# Patient Record
Sex: MALE | Race: WHITE | Employment: OTHER | URBAN - NONMETROPOLITAN AREA
[De-identification: names, ages, dates, MRNs, and addresses within clinical notes are randomized per-mention and may not be internally consistent; named-entity substitution may affect disease eponyms.]

---

## 2017-03-11 ENCOUNTER — HOSPITAL ENCOUNTER (EMERGENCY)
Age: 52
Discharge: HOME | End: 2017-03-11
Attending: FAMILY MEDICINE
Payer: MEDICARE

## 2017-03-11 VITALS
SYSTOLIC BLOOD PRESSURE: 148 MMHG | DIASTOLIC BLOOD PRESSURE: 87 MMHG | BODY MASS INDEX: 35 KG/M2 | TEMPERATURE: 97.4 F | OXYGEN SATURATION: 98 % | WEIGHT: 280 LBS | HEART RATE: 80 BPM | RESPIRATION RATE: 20 BRPM

## 2017-03-11 DIAGNOSIS — L03.115 CELLULITIS OF RIGHT LOWER EXTREMITY: Primary | ICD-10-CM

## 2017-03-11 LAB
ABSOLUTE EOS #: 0.2 K/UL (ref 0–0.4)
ABSOLUTE LYMPH #: 1.3 K/UL (ref 0.9–2.5)
ABSOLUTE MONO #: 0.5 K/UL (ref 0–1)
ALBUMIN SERPL-MCNC: 3.6 G/DL (ref 3.5–5.2)
ALBUMIN/GLOBULIN RATIO: ABNORMAL (ref 1–2.5)
ALP BLD-CCNC: 89 U/L (ref 40–129)
ALT SERPL-CCNC: 32 U/L (ref 5–41)
ANION GAP SERPL CALCULATED.3IONS-SCNC: 11 MMOL/L (ref 9–17)
AST SERPL-CCNC: 32 U/L
BASOPHILS # BLD: 0 % (ref 0–2)
BASOPHILS ABSOLUTE: 0 K/UL (ref 0–0.2)
BILIRUB SERPL-MCNC: 0.62 MG/DL (ref 0.3–1.2)
BUN BLDV-MCNC: 12 MG/DL (ref 6–20)
BUN/CREAT BLD: 13 (ref 9–20)
CALCIUM SERPL-MCNC: 8.8 MG/DL (ref 8.6–10.4)
CHLORIDE BLD-SCNC: 102 MMOL/L (ref 98–107)
CO2: 27 MMOL/L (ref 20–31)
CREAT SERPL-MCNC: 0.9 MG/DL (ref 0.7–1.2)
D-DIMER QUANTITATIVE: 0.3 MG/L FEU (ref 0–0.5)
DIFFERENTIAL TYPE: YES
EOSINOPHILS RELATIVE PERCENT: 3 % (ref 0–5)
GFR AFRICAN AMERICAN: >60 ML/MIN
GFR NON-AFRICAN AMERICAN: >60 ML/MIN
GFR SERPL CREATININE-BSD FRML MDRD: ABNORMAL ML/MIN/{1.73_M2}
GFR SERPL CREATININE-BSD FRML MDRD: ABNORMAL ML/MIN/{1.73_M2}
GLUCOSE BLD-MCNC: 137 MG/DL (ref 70–99)
HCT VFR BLD CALC: 48.8 % (ref 41–53)
HEMOGLOBIN: 15.9 G/DL (ref 13.5–17.5)
LACTIC ACID: 1.6 MMOL/L (ref 0.5–2.2)
LYMPHOCYTES # BLD: 18 % (ref 13–44)
MCH RBC QN AUTO: 27 PG (ref 26–34)
MCHC RBC AUTO-ENTMCNC: 32.7 G/DL (ref 31–37)
MCV RBC AUTO: 82.6 FL (ref 80–100)
MONOCYTES # BLD: 7 % (ref 5–9)
PDW BLD-RTO: 14.7 % (ref 12.1–15.2)
PLATELET # BLD: 189 K/UL (ref 140–450)
PLATELET ESTIMATE: ABNORMAL
PMV BLD AUTO: ABNORMAL FL (ref 6–12)
POTASSIUM SERPL-SCNC: 3.5 MMOL/L (ref 3.7–5.3)
RBC # BLD: 5.91 M/UL (ref 4.5–5.9)
RBC # BLD: ABNORMAL 10*6/UL
SEG NEUTROPHILS: 72 % (ref 39–75)
SEGMENTED NEUTROPHILS ABSOLUTE COUNT: 4.9 K/UL (ref 2.1–6.5)
SODIUM BLD-SCNC: 140 MMOL/L (ref 135–144)
TOTAL PROTEIN: 7.3 G/DL (ref 6.4–8.3)
WBC # BLD: 6.9 K/UL (ref 3.5–11)
WBC # BLD: ABNORMAL 10*3/UL

## 2017-03-11 PROCEDURE — 80053 COMPREHEN METABOLIC PANEL: CPT

## 2017-03-11 PROCEDURE — 2580000003 HC RX 258: Performed by: FAMILY MEDICINE

## 2017-03-11 PROCEDURE — 99283 EMERGENCY DEPT VISIT LOW MDM: CPT

## 2017-03-11 PROCEDURE — 83605 ASSAY OF LACTIC ACID: CPT

## 2017-03-11 PROCEDURE — 87040 BLOOD CULTURE FOR BACTERIA: CPT

## 2017-03-11 PROCEDURE — 85379 FIBRIN DEGRADATION QUANT: CPT

## 2017-03-11 PROCEDURE — 96367 TX/PROPH/DG ADDL SEQ IV INF: CPT

## 2017-03-11 PROCEDURE — 96365 THER/PROPH/DIAG IV INF INIT: CPT

## 2017-03-11 PROCEDURE — 85025 COMPLETE CBC W/AUTO DIFF WBC: CPT

## 2017-03-11 PROCEDURE — 96375 TX/PRO/DX INJ NEW DRUG ADDON: CPT

## 2017-03-11 PROCEDURE — 6360000002 HC RX W HCPCS: Performed by: FAMILY MEDICINE

## 2017-03-11 RX ORDER — CEPHALEXIN 500 MG/1
500 CAPSULE ORAL 3 TIMES DAILY
Qty: 30 CAPSULE | Refills: 0 | Status: SHIPPED | OUTPATIENT
Start: 2017-03-11 | End: 2019-06-13

## 2017-03-11 RX ORDER — SULFAMETHOXAZOLE AND TRIMETHOPRIM 800; 160 MG/1; MG/1
1 TABLET ORAL 2 TIMES DAILY
Qty: 20 TABLET | Refills: 0 | Status: SHIPPED | OUTPATIENT
Start: 2017-03-11 | End: 2017-03-21

## 2017-03-11 RX ORDER — KETOROLAC TROMETHAMINE 30 MG/ML
30 INJECTION, SOLUTION INTRAMUSCULAR; INTRAVENOUS ONCE
Status: COMPLETED | OUTPATIENT
Start: 2017-03-11 | End: 2017-03-11

## 2017-03-11 RX ADMIN — CEFTRIAXONE 1 G: 1 INJECTION, POWDER, FOR SOLUTION INTRAMUSCULAR; INTRAVENOUS at 14:27

## 2017-03-11 RX ADMIN — VANCOMYCIN HYDROCHLORIDE 1000 MG: 1 INJECTION, POWDER, LYOPHILIZED, FOR SOLUTION INTRAVENOUS at 15:14

## 2017-03-11 RX ADMIN — KETOROLAC TROMETHAMINE 30 MG: 30 INJECTION, SOLUTION INTRAMUSCULAR at 16:26

## 2017-03-11 ASSESSMENT — PAIN SCALES - GENERAL
PAINLEVEL_OUTOF10: 5
PAINLEVEL_OUTOF10: 5
PAINLEVEL_OUTOF10: 6

## 2017-03-11 ASSESSMENT — PAIN DESCRIPTION - PAIN TYPE: TYPE: ACUTE PAIN

## 2017-03-11 ASSESSMENT — PAIN DESCRIPTION - DESCRIPTORS: DESCRIPTORS: CONSTANT;DULL

## 2017-03-11 ASSESSMENT — PAIN DESCRIPTION - LOCATION: LOCATION: LEG

## 2017-03-11 ASSESSMENT — PAIN DESCRIPTION - ORIENTATION: ORIENTATION: RIGHT

## 2017-03-11 ASSESSMENT — PAIN DESCRIPTION - FREQUENCY: FREQUENCY: INTERMITTENT

## 2017-03-17 LAB
CULTURE: NORMAL
Lab: NORMAL
SPECIMEN DESCRIPTION: NORMAL
STATUS: NORMAL

## 2019-06-13 ENCOUNTER — HOSPITAL ENCOUNTER (INPATIENT)
Age: 54
LOS: 4 days | Discharge: HOME | DRG: 603 | End: 2019-06-17
Attending: EMERGENCY MEDICINE | Admitting: INTERNAL MEDICINE
Payer: MEDICARE

## 2019-06-13 ENCOUNTER — APPOINTMENT (OUTPATIENT)
Dept: GENERAL RADIOLOGY | Age: 54
DRG: 603 | End: 2019-06-13
Payer: MEDICARE

## 2019-06-13 ENCOUNTER — APPOINTMENT (OUTPATIENT)
Dept: VASCULAR LAB | Age: 54
DRG: 603 | End: 2019-06-13
Payer: MEDICARE

## 2019-06-13 DIAGNOSIS — L03.115 CELLULITIS OF RIGHT LOWER EXTREMITY: Primary | ICD-10-CM

## 2019-06-13 DIAGNOSIS — L03.119 CELLULITIS OF LOWER LEG: ICD-10-CM

## 2019-06-13 LAB
-: NORMAL
ABSOLUTE EOS #: 0.4 K/UL (ref 0–0.4)
ABSOLUTE IMMATURE GRANULOCYTE: ABNORMAL K/UL (ref 0–0.3)
ABSOLUTE LYMPH #: 1.4 K/UL (ref 1–4.8)
ABSOLUTE MONO #: 0.6 K/UL (ref 0–1)
ALBUMIN SERPL-MCNC: 4.2 G/DL (ref 3.5–5.2)
ALBUMIN/GLOBULIN RATIO: ABNORMAL (ref 1–2.5)
ALP BLD-CCNC: 95 U/L (ref 40–129)
ALT SERPL-CCNC: 86 U/L (ref 5–41)
AMORPHOUS: NORMAL
ANION GAP SERPL CALCULATED.3IONS-SCNC: 11 MMOL/L (ref 9–17)
AST SERPL-CCNC: 61 U/L
BACTERIA: NORMAL
BASOPHILS # BLD: 0 % (ref 0–2)
BASOPHILS ABSOLUTE: 0 K/UL (ref 0–0.2)
BILIRUB SERPL-MCNC: 0.49 MG/DL (ref 0.3–1.2)
BILIRUBIN URINE: NEGATIVE
BNP INTERPRETATION: ABNORMAL
BUN BLDV-MCNC: 12 MG/DL (ref 6–20)
BUN/CREAT BLD: 16 (ref 9–20)
CALCIUM SERPL-MCNC: 9.5 MG/DL (ref 8.6–10.4)
CASTS UA: NORMAL /LPF
CHLORIDE BLD-SCNC: 101 MMOL/L (ref 98–107)
CO2: 27 MMOL/L (ref 20–31)
COLOR: YELLOW
COMMENT UA: ABNORMAL
CREAT SERPL-MCNC: 0.74 MG/DL (ref 0.7–1.2)
CRYSTALS, UA: NORMAL /HPF
DIFFERENTIAL TYPE: YES
EOSINOPHILS RELATIVE PERCENT: 7 % (ref 0–5)
EPITHELIAL CELLS UA: NORMAL /HPF
GFR AFRICAN AMERICAN: >60 ML/MIN
GFR NON-AFRICAN AMERICAN: >60 ML/MIN
GFR SERPL CREATININE-BSD FRML MDRD: ABNORMAL ML/MIN/{1.73_M2}
GFR SERPL CREATININE-BSD FRML MDRD: ABNORMAL ML/MIN/{1.73_M2}
GLUCOSE BLD-MCNC: 90 MG/DL (ref 70–99)
GLUCOSE URINE: NEGATIVE
HCT VFR BLD CALC: 44.6 % (ref 41–53)
HEMOGLOBIN: 14.6 G/DL (ref 13.5–17.5)
IMMATURE GRANULOCYTES: ABNORMAL %
KETONES, URINE: NEGATIVE
LEUKOCYTE ESTERASE, URINE: NEGATIVE
LYMPHOCYTES # BLD: 22 % (ref 13–44)
MCH RBC QN AUTO: 26.8 PG (ref 26–34)
MCHC RBC AUTO-ENTMCNC: 32.8 G/DL (ref 31–37)
MCV RBC AUTO: 81.5 FL (ref 80–100)
MONOCYTES # BLD: 10 % (ref 5–9)
MUCUS: NORMAL
NITRITE, URINE: NEGATIVE
NRBC AUTOMATED: ABNORMAL PER 100 WBC
OTHER OBSERVATIONS UA: NORMAL
PDW BLD-RTO: 14.4 % (ref 12.1–15.2)
PH UA: 7 (ref 5–8)
PLATELET # BLD: 194 K/UL (ref 140–450)
PLATELET ESTIMATE: ABNORMAL
PMV BLD AUTO: ABNORMAL FL (ref 6–12)
POTASSIUM SERPL-SCNC: 4.1 MMOL/L (ref 3.7–5.3)
PRO-BNP: 370 PG/ML
PROTEIN UA: NEGATIVE
RBC # BLD: 5.47 M/UL (ref 4.5–5.9)
RBC # BLD: ABNORMAL 10*6/UL
RBC UA: NORMAL /HPF (ref 0–2)
RENAL EPITHELIAL, UA: NORMAL /HPF
SEG NEUTROPHILS: 61 % (ref 39–75)
SEGMENTED NEUTROPHILS ABSOLUTE COUNT: 3.8 K/UL (ref 2.1–6.5)
SODIUM BLD-SCNC: 139 MMOL/L (ref 135–144)
SPECIFIC GRAVITY UA: 1.01 (ref 1–1.03)
TOTAL PROTEIN: 8.1 G/DL (ref 6.4–8.3)
TRICHOMONAS: NORMAL
TROPONIN INTERP: NORMAL
TROPONIN T: <0.03 NG/ML
TROPONIN, HIGH SENSITIVITY: NORMAL NG/L (ref 0–22)
TURBIDITY: CLEAR
URINE HGB: NEGATIVE
UROBILINOGEN, URINE: ABNORMAL
WBC # BLD: 6.3 K/UL (ref 3.5–11)
WBC # BLD: ABNORMAL 10*3/UL
WBC UA: NORMAL /HPF
YEAST: NORMAL

## 2019-06-13 PROCEDURE — 71045 X-RAY EXAM CHEST 1 VIEW: CPT

## 2019-06-13 PROCEDURE — 80053 COMPREHEN METABOLIC PANEL: CPT

## 2019-06-13 PROCEDURE — 83880 ASSAY OF NATRIURETIC PEPTIDE: CPT

## 2019-06-13 PROCEDURE — 6360000002 HC RX W HCPCS: Performed by: EMERGENCY MEDICINE

## 2019-06-13 PROCEDURE — 36415 COLL VENOUS BLD VENIPUNCTURE: CPT

## 2019-06-13 PROCEDURE — 93971 EXTREMITY STUDY: CPT

## 2019-06-13 PROCEDURE — 85025 COMPLETE CBC W/AUTO DIFF WBC: CPT

## 2019-06-13 PROCEDURE — 6360000002 HC RX W HCPCS: Performed by: INTERNAL MEDICINE

## 2019-06-13 PROCEDURE — 2580000003 HC RX 258: Performed by: INTERNAL MEDICINE

## 2019-06-13 PROCEDURE — 6370000000 HC RX 637 (ALT 250 FOR IP): Performed by: INTERNAL MEDICINE

## 2019-06-13 PROCEDURE — 84484 ASSAY OF TROPONIN QUANT: CPT

## 2019-06-13 PROCEDURE — 96375 TX/PRO/DX INJ NEW DRUG ADDON: CPT

## 2019-06-13 PROCEDURE — 81001 URINALYSIS AUTO W/SCOPE: CPT

## 2019-06-13 PROCEDURE — 96365 THER/PROPH/DIAG IV INF INIT: CPT

## 2019-06-13 PROCEDURE — 94761 N-INVAS EAR/PLS OXIMETRY MLT: CPT

## 2019-06-13 PROCEDURE — 87040 BLOOD CULTURE FOR BACTERIA: CPT

## 2019-06-13 PROCEDURE — 99285 EMERGENCY DEPT VISIT HI MDM: CPT

## 2019-06-13 PROCEDURE — 2580000003 HC RX 258: Performed by: EMERGENCY MEDICINE

## 2019-06-13 PROCEDURE — 93005 ELECTROCARDIOGRAM TRACING: CPT | Performed by: EMERGENCY MEDICINE

## 2019-06-13 PROCEDURE — 1200000000 HC SEMI PRIVATE

## 2019-06-13 RX ORDER — LEVOFLOXACIN 5 MG/ML
500 INJECTION, SOLUTION INTRAVENOUS EVERY 24 HOURS
Status: DISCONTINUED | OUTPATIENT
Start: 2019-06-13 | End: 2019-06-17 | Stop reason: HOSPADM

## 2019-06-13 RX ORDER — FENOFIBRATE 160 MG/1
160 TABLET ORAL DAILY
Status: ON HOLD | COMMUNITY
End: 2019-06-17 | Stop reason: HOSPADM

## 2019-06-13 RX ORDER — TRIAMTERENE AND HYDROCHLOROTHIAZIDE 37.5; 25 MG/1; MG/1
1 TABLET ORAL DAILY
Status: DISCONTINUED | OUTPATIENT
Start: 2019-06-13 | End: 2019-06-17 | Stop reason: HOSPADM

## 2019-06-13 RX ORDER — VANCOMYCIN HYDROCHLORIDE 500 MG/10ML
INJECTION, POWDER, LYOPHILIZED, FOR SOLUTION INTRAVENOUS
Status: DISPENSED
Start: 2019-06-13 | End: 2019-06-14

## 2019-06-13 RX ORDER — METOPROLOL TARTRATE 50 MG/1
75 TABLET, FILM COATED ORAL 2 TIMES DAILY
COMMUNITY
End: 2019-06-24 | Stop reason: SDUPTHER

## 2019-06-13 RX ORDER — KETOROLAC TROMETHAMINE 30 MG/ML
15 INJECTION, SOLUTION INTRAMUSCULAR; INTRAVENOUS ONCE
Status: COMPLETED | OUTPATIENT
Start: 2019-06-13 | End: 2019-06-13

## 2019-06-13 RX ORDER — HYDRALAZINE HYDROCHLORIDE 20 MG/ML
10 INJECTION INTRAMUSCULAR; INTRAVENOUS EVERY 6 HOURS PRN
Status: DISCONTINUED | OUTPATIENT
Start: 2019-06-13 | End: 2019-06-15

## 2019-06-13 RX ORDER — SODIUM CHLORIDE 0.9 % (FLUSH) 0.9 %
10 SYRINGE (ML) INJECTION EVERY 12 HOURS SCHEDULED
Status: DISCONTINUED | OUTPATIENT
Start: 2019-06-13 | End: 2019-06-17 | Stop reason: HOSPADM

## 2019-06-13 RX ORDER — HYDROCODONE BITARTRATE AND ACETAMINOPHEN 5; 325 MG/1; MG/1
1 TABLET ORAL EVERY 6 HOURS PRN
Status: DISCONTINUED | OUTPATIENT
Start: 2019-06-13 | End: 2019-06-14

## 2019-06-13 RX ORDER — SODIUM CHLORIDE 0.9 % (FLUSH) 0.9 %
10 SYRINGE (ML) INJECTION PRN
Status: DISCONTINUED | OUTPATIENT
Start: 2019-06-13 | End: 2019-06-17 | Stop reason: HOSPADM

## 2019-06-13 RX ORDER — ONDANSETRON 2 MG/ML
4 INJECTION INTRAMUSCULAR; INTRAVENOUS EVERY 6 HOURS PRN
Status: DISCONTINUED | OUTPATIENT
Start: 2019-06-13 | End: 2019-06-17 | Stop reason: HOSPADM

## 2019-06-13 RX ORDER — VANCOMYCIN HYDROCHLORIDE 1 G/20ML
INJECTION, POWDER, LYOPHILIZED, FOR SOLUTION INTRAVENOUS
Status: DISPENSED
Start: 2019-06-13 | End: 2019-06-14

## 2019-06-13 RX ORDER — ACETAMINOPHEN 325 MG/1
650 TABLET ORAL EVERY 4 HOURS PRN
Status: DISCONTINUED | OUTPATIENT
Start: 2019-06-13 | End: 2019-06-17 | Stop reason: HOSPADM

## 2019-06-13 RX ORDER — FENOFIBRATE 160 MG/1
160 TABLET ORAL DAILY
Status: DISCONTINUED | OUTPATIENT
Start: 2019-06-13 | End: 2019-06-17 | Stop reason: HOSPADM

## 2019-06-13 RX ADMIN — KETOROLAC TROMETHAMINE 15 MG: 30 INJECTION, SOLUTION INTRAMUSCULAR at 18:47

## 2019-06-13 RX ADMIN — SODIUM CHLORIDE 3 G: 900 INJECTION INTRAVENOUS at 18:37

## 2019-06-13 RX ADMIN — Medication 10 ML: at 20:27

## 2019-06-13 RX ADMIN — VANCOMYCIN HYDROCHLORIDE 1500 MG: 1 INJECTION, POWDER, LYOPHILIZED, FOR SOLUTION INTRAVENOUS at 22:22

## 2019-06-13 RX ADMIN — LEVOFLOXACIN 500 MG: 5 INJECTION, SOLUTION INTRAVENOUS at 20:27

## 2019-06-13 RX ADMIN — ENOXAPARIN SODIUM 40 MG: 40 INJECTION SUBCUTANEOUS at 20:27

## 2019-06-13 RX ADMIN — HYDROCODONE BITARTRATE AND ACETAMINOPHEN 1 TABLET: 5; 325 TABLET ORAL at 22:24

## 2019-06-13 RX ADMIN — Medication 10 ML: at 21:25

## 2019-06-13 RX ADMIN — FENOFIBRATE 160 MG: 160 TABLET ORAL at 20:28

## 2019-06-13 RX ADMIN — METOPROLOL TARTRATE 75 MG: 50 TABLET ORAL at 20:28

## 2019-06-13 RX ADMIN — TRIAMTERENE AND HYDROCHLOROTHIAZIDE 1 TABLET: 37.5; 25 TABLET ORAL at 20:28

## 2019-06-13 ASSESSMENT — PAIN DESCRIPTION - LOCATION
LOCATION: LEG
LOCATION: LEG

## 2019-06-13 ASSESSMENT — PAIN DESCRIPTION - ONSET
ONSET: ON-GOING
ONSET: ON-GOING

## 2019-06-13 ASSESSMENT — PAIN DESCRIPTION - PAIN TYPE
TYPE: ACUTE PAIN
TYPE: ACUTE PAIN

## 2019-06-13 ASSESSMENT — PAIN DESCRIPTION - DESCRIPTORS
DESCRIPTORS: THROBBING
DESCRIPTORS: THROBBING

## 2019-06-13 ASSESSMENT — PAIN SCALES - GENERAL
PAINLEVEL_OUTOF10: 6
PAINLEVEL_OUTOF10: 8
PAINLEVEL_OUTOF10: 5
PAINLEVEL_OUTOF10: 7
PAINLEVEL_OUTOF10: 8

## 2019-06-13 ASSESSMENT — PAIN DESCRIPTION - PROGRESSION: CLINICAL_PROGRESSION: NOT CHANGED

## 2019-06-13 ASSESSMENT — PAIN DESCRIPTION - FREQUENCY
FREQUENCY: CONTINUOUS
FREQUENCY: CONTINUOUS

## 2019-06-13 ASSESSMENT — ENCOUNTER SYMPTOMS
COLOR CHANGE: 1
SHORTNESS OF BREATH: 0
NAUSEA: 0
SORE THROAT: 0
VOMITING: 0
BACK PAIN: 0
COUGH: 0
ABDOMINAL PAIN: 0
DIARRHEA: 0
EYE PAIN: 0
TROUBLE SWALLOWING: 0
EYE REDNESS: 0

## 2019-06-13 ASSESSMENT — PAIN DESCRIPTION - ORIENTATION
ORIENTATION: RIGHT
ORIENTATION: RIGHT

## 2019-06-14 LAB
ABSOLUTE EOS #: 0.4 K/UL (ref 0–0.4)
ABSOLUTE IMMATURE GRANULOCYTE: ABNORMAL K/UL (ref 0–0.3)
ABSOLUTE LYMPH #: 1.1 K/UL (ref 1–4.8)
ABSOLUTE MONO #: 0.8 K/UL (ref 0–1)
ANION GAP SERPL CALCULATED.3IONS-SCNC: 12 MMOL/L (ref 9–17)
BASOPHILS # BLD: 1 % (ref 0–2)
BASOPHILS ABSOLUTE: 0 K/UL (ref 0–0.2)
BUN BLDV-MCNC: 13 MG/DL (ref 6–20)
BUN/CREAT BLD: 17 (ref 9–20)
CALCIUM SERPL-MCNC: 9.7 MG/DL (ref 8.6–10.4)
CHLORIDE BLD-SCNC: 98 MMOL/L (ref 98–107)
CO2: 28 MMOL/L (ref 20–31)
CREAT SERPL-MCNC: 0.75 MG/DL (ref 0.7–1.2)
DIFFERENTIAL TYPE: YES
EKG ATRIAL RATE: 68 BPM
EKG P AXIS: -16 DEGREES
EKG P-R INTERVAL: 198 MS
EKG Q-T INTERVAL: 442 MS
EKG QRS DURATION: 108 MS
EKG QTC CALCULATION (BAZETT): 469 MS
EKG R AXIS: 25 DEGREES
EKG T AXIS: 24 DEGREES
EKG VENTRICULAR RATE: 68 BPM
EOSINOPHILS RELATIVE PERCENT: 8 % (ref 0–5)
GFR AFRICAN AMERICAN: >60 ML/MIN
GFR NON-AFRICAN AMERICAN: >60 ML/MIN
GFR SERPL CREATININE-BSD FRML MDRD: ABNORMAL ML/MIN/{1.73_M2}
GFR SERPL CREATININE-BSD FRML MDRD: ABNORMAL ML/MIN/{1.73_M2}
GLUCOSE BLD-MCNC: 111 MG/DL (ref 70–99)
HCT VFR BLD CALC: 43.6 % (ref 41–53)
HEMOGLOBIN: 14.4 G/DL (ref 13.5–17.5)
IMMATURE GRANULOCYTES: ABNORMAL %
LYMPHOCYTES # BLD: 20 % (ref 13–44)
MCH RBC QN AUTO: 26.8 PG (ref 26–34)
MCHC RBC AUTO-ENTMCNC: 33.1 G/DL (ref 31–37)
MCV RBC AUTO: 81.1 FL (ref 80–100)
MONOCYTES # BLD: 14 % (ref 5–9)
NRBC AUTOMATED: ABNORMAL PER 100 WBC
PDW BLD-RTO: 14.3 % (ref 12.1–15.2)
PLATELET # BLD: 167 K/UL (ref 140–450)
PLATELET ESTIMATE: ABNORMAL
PMV BLD AUTO: ABNORMAL FL (ref 6–12)
POTASSIUM SERPL-SCNC: 3.9 MMOL/L (ref 3.7–5.3)
RBC # BLD: 5.38 M/UL (ref 4.5–5.9)
RBC # BLD: ABNORMAL 10*6/UL
SEG NEUTROPHILS: 57 % (ref 39–75)
SEGMENTED NEUTROPHILS ABSOLUTE COUNT: 3.2 K/UL (ref 2.1–6.5)
SODIUM BLD-SCNC: 138 MMOL/L (ref 135–144)
WBC # BLD: 5.6 K/UL (ref 3.5–11)
WBC # BLD: ABNORMAL 10*3/UL

## 2019-06-14 PROCEDURE — 80048 BASIC METABOLIC PNL TOTAL CA: CPT

## 2019-06-14 PROCEDURE — 1200000000 HC SEMI PRIVATE

## 2019-06-14 PROCEDURE — 6360000002 HC RX W HCPCS: Performed by: INTERNAL MEDICINE

## 2019-06-14 PROCEDURE — 6360000002 HC RX W HCPCS

## 2019-06-14 PROCEDURE — 36415 COLL VENOUS BLD VENIPUNCTURE: CPT

## 2019-06-14 PROCEDURE — 93010 ELECTROCARDIOGRAM REPORT: CPT | Performed by: INTERNAL MEDICINE

## 2019-06-14 PROCEDURE — 2580000003 HC RX 258: Performed by: INTERNAL MEDICINE

## 2019-06-14 PROCEDURE — 94761 N-INVAS EAR/PLS OXIMETRY MLT: CPT

## 2019-06-14 PROCEDURE — 6370000000 HC RX 637 (ALT 250 FOR IP): Performed by: INTERNAL MEDICINE

## 2019-06-14 PROCEDURE — 85025 COMPLETE CBC W/AUTO DIFF WBC: CPT

## 2019-06-14 RX ORDER — OXYCODONE HYDROCHLORIDE AND ACETAMINOPHEN 5; 325 MG/1; MG/1
1 TABLET ORAL EVERY 4 HOURS PRN
Status: DISCONTINUED | OUTPATIENT
Start: 2019-06-14 | End: 2019-06-17 | Stop reason: HOSPADM

## 2019-06-14 RX ORDER — VANCOMYCIN HYDROCHLORIDE 1 G/20ML
INJECTION, POWDER, LYOPHILIZED, FOR SOLUTION INTRAVENOUS
Status: COMPLETED
Start: 2019-06-14 | End: 2019-06-14

## 2019-06-14 RX ORDER — VANCOMYCIN HYDROCHLORIDE 500 MG/10ML
INJECTION, POWDER, LYOPHILIZED, FOR SOLUTION INTRAVENOUS
Status: COMPLETED
Start: 2019-06-14 | End: 2019-06-14

## 2019-06-14 RX ADMIN — TRIAMTERENE AND HYDROCHLOROTHIAZIDE 1 TABLET: 37.5; 25 TABLET ORAL at 08:41

## 2019-06-14 RX ADMIN — Medication 10 ML: at 20:02

## 2019-06-14 RX ADMIN — VANCOMYCIN HYDROCHLORIDE 1000 MG: 500 INJECTION, POWDER, LYOPHILIZED, FOR SOLUTION INTRAVENOUS at 05:26

## 2019-06-14 RX ADMIN — HYDRALAZINE HYDROCHLORIDE 10 MG: 20 INJECTION INTRAMUSCULAR; INTRAVENOUS at 22:05

## 2019-06-14 RX ADMIN — VANCOMYCIN HYDROCHLORIDE 1500 MG: 1 INJECTION, POWDER, LYOPHILIZED, FOR SOLUTION INTRAVENOUS at 05:27

## 2019-06-14 RX ADMIN — ENOXAPARIN SODIUM 40 MG: 40 INJECTION SUBCUTANEOUS at 08:41

## 2019-06-14 RX ADMIN — Medication 10 ML: at 08:41

## 2019-06-14 RX ADMIN — OXYCODONE HYDROCHLORIDE AND ACETAMINOPHEN 1 TABLET: 5; 325 TABLET ORAL at 17:56

## 2019-06-14 RX ADMIN — FENOFIBRATE 160 MG: 160 TABLET ORAL at 08:41

## 2019-06-14 RX ADMIN — OXYCODONE HYDROCHLORIDE AND ACETAMINOPHEN 1 TABLET: 5; 325 TABLET ORAL at 22:05

## 2019-06-14 RX ADMIN — LEVOFLOXACIN 500 MG: 5 INJECTION, SOLUTION INTRAVENOUS at 20:01

## 2019-06-14 RX ADMIN — HYDROCODONE BITARTRATE AND ACETAMINOPHEN 1 TABLET: 5; 325 TABLET ORAL at 05:08

## 2019-06-14 RX ADMIN — VANCOMYCIN HYDROCHLORIDE 1500 MG: 1 INJECTION, POWDER, LYOPHILIZED, FOR SOLUTION INTRAVENOUS at 13:13

## 2019-06-14 RX ADMIN — OXYCODONE HYDROCHLORIDE AND ACETAMINOPHEN 1 TABLET: 5; 325 TABLET ORAL at 09:11

## 2019-06-14 RX ADMIN — VANCOMYCIN HYDROCHLORIDE 500 MG: 1 INJECTION, POWDER, LYOPHILIZED, FOR SOLUTION INTRAVENOUS at 05:27

## 2019-06-14 RX ADMIN — VANCOMYCIN HYDROCHLORIDE 1500 MG: 1 INJECTION, POWDER, LYOPHILIZED, FOR SOLUTION INTRAVENOUS at 21:30

## 2019-06-14 RX ADMIN — METOPROLOL TARTRATE 75 MG: 50 TABLET ORAL at 08:39

## 2019-06-14 RX ADMIN — METOPROLOL TARTRATE 75 MG: 50 TABLET ORAL at 20:02

## 2019-06-14 RX ADMIN — OXYCODONE HYDROCHLORIDE AND ACETAMINOPHEN 1 TABLET: 5; 325 TABLET ORAL at 13:20

## 2019-06-14 ASSESSMENT — PAIN SCALES - GENERAL
PAINLEVEL_OUTOF10: 7
PAINLEVEL_OUTOF10: 10
PAINLEVEL_OUTOF10: 6
PAINLEVEL_OUTOF10: 7
PAINLEVEL_OUTOF10: 6
PAINLEVEL_OUTOF10: 7
PAINLEVEL_OUTOF10: 8
PAINLEVEL_OUTOF10: 8
PAINLEVEL_OUTOF10: 6
PAINLEVEL_OUTOF10: 8

## 2019-06-14 ASSESSMENT — PAIN DESCRIPTION - LOCATION
LOCATION: LEG

## 2019-06-14 ASSESSMENT — PAIN DESCRIPTION - PAIN TYPE
TYPE: ACUTE PAIN

## 2019-06-14 ASSESSMENT — PAIN DESCRIPTION - ORIENTATION
ORIENTATION: RIGHT

## 2019-06-14 ASSESSMENT — PAIN DESCRIPTION - DESCRIPTORS: DESCRIPTORS: THROBBING

## 2019-06-14 NOTE — PROGRESS NOTES
06/14/2019    K 3.9 06/14/2019    CL 98 06/14/2019    CO2 28 06/14/2019    BUN 13 06/14/2019    CREATININE 0.75 06/14/2019    GLUCOSE 111 (H) 06/14/2019    CALCIUM 9.7 06/14/2019    PROT 8.1 06/13/2019    LABALBU 4.2 06/13/2019    BILITOT 0.49 06/13/2019    ALKPHOS 95 06/13/2019    AST 61 (H) 06/13/2019    ALT 86 (H) 06/13/2019    LABGLOM >60 06/14/2019    GFRAA >60 06/14/2019    GLOB NOT REPORTED 05/22/2015     No results found for: LABA1C  No results found for: EAG    Nutrition Interventions:   Continue current diet  Continued Inpatient Monitoring, Coordination of Care    Nutrition Evaluation:   · Evaluation: Goals set   · Goals: PO >75% meals     · Monitoring: Meal Intake, Pertinent Labs, I&O    Electronically signed by Harmony Graf RD, LD on 6/14/19 at 10:24 AM    Contact Number: [id number]

## 2019-06-15 LAB
VANCOMYCIN TROUGH DATE LAST DOSE: NORMAL
VANCOMYCIN TROUGH DOSE AMOUNT: 500
VANCOMYCIN TROUGH TIME LAST DOSE: NORMAL
VANCOMYCIN TROUGH: 19.9 UG/ML (ref 10–20)

## 2019-06-15 PROCEDURE — 94761 N-INVAS EAR/PLS OXIMETRY MLT: CPT

## 2019-06-15 PROCEDURE — 2580000003 HC RX 258: Performed by: INTERNAL MEDICINE

## 2019-06-15 PROCEDURE — 80202 ASSAY OF VANCOMYCIN: CPT

## 2019-06-15 PROCEDURE — 1200000000 HC SEMI PRIVATE

## 2019-06-15 PROCEDURE — 36415 COLL VENOUS BLD VENIPUNCTURE: CPT

## 2019-06-15 PROCEDURE — 6360000002 HC RX W HCPCS: Performed by: INTERNAL MEDICINE

## 2019-06-15 PROCEDURE — 6370000000 HC RX 637 (ALT 250 FOR IP): Performed by: INTERNAL MEDICINE

## 2019-06-15 RX ORDER — VANCOMYCIN HYDROCHLORIDE 500 MG/10ML
INJECTION, POWDER, LYOPHILIZED, FOR SOLUTION INTRAVENOUS
Status: DISPENSED
Start: 2019-06-15 | End: 2019-06-16

## 2019-06-15 RX ORDER — VANCOMYCIN HYDROCHLORIDE 1 G/20ML
INJECTION, POWDER, LYOPHILIZED, FOR SOLUTION INTRAVENOUS
Status: DISPENSED
Start: 2019-06-15 | End: 2019-06-15

## 2019-06-15 RX ORDER — VANCOMYCIN HYDROCHLORIDE 500 MG/10ML
INJECTION, POWDER, LYOPHILIZED, FOR SOLUTION INTRAVENOUS
Status: DISPENSED
Start: 2019-06-15 | End: 2019-06-15

## 2019-06-15 RX ORDER — VANCOMYCIN HYDROCHLORIDE 1 G/20ML
INJECTION, POWDER, LYOPHILIZED, FOR SOLUTION INTRAVENOUS
Status: DISPENSED
Start: 2019-06-15 | End: 2019-06-16

## 2019-06-15 RX ORDER — SODIUM CHLORIDE 9 MG/ML
INJECTION, SOLUTION INTRAVENOUS CONTINUOUS
Status: DISCONTINUED | OUTPATIENT
Start: 2019-06-15 | End: 2019-06-16

## 2019-06-15 RX ADMIN — FENOFIBRATE 160 MG: 160 TABLET ORAL at 08:49

## 2019-06-15 RX ADMIN — HYDRALAZINE HYDROCHLORIDE 10 MG: 20 INJECTION INTRAMUSCULAR; INTRAVENOUS at 06:36

## 2019-06-15 RX ADMIN — Medication 10 ML: at 16:03

## 2019-06-15 RX ADMIN — Medication 10 ML: at 20:08

## 2019-06-15 RX ADMIN — OXYCODONE HYDROCHLORIDE AND ACETAMINOPHEN 1 TABLET: 5; 325 TABLET ORAL at 10:41

## 2019-06-15 RX ADMIN — ENOXAPARIN SODIUM 40 MG: 40 INJECTION SUBCUTANEOUS at 08:49

## 2019-06-15 RX ADMIN — Medication 10 ML: at 13:58

## 2019-06-15 RX ADMIN — VANCOMYCIN HYDROCHLORIDE 1500 MG: 1 INJECTION, POWDER, LYOPHILIZED, FOR SOLUTION INTRAVENOUS at 21:52

## 2019-06-15 RX ADMIN — LEVOFLOXACIN 500 MG: 5 INJECTION, SOLUTION INTRAVENOUS at 20:07

## 2019-06-15 RX ADMIN — OXYCODONE HYDROCHLORIDE AND ACETAMINOPHEN 1 TABLET: 5; 325 TABLET ORAL at 02:33

## 2019-06-15 RX ADMIN — VANCOMYCIN HYDROCHLORIDE 1500 MG: 1 INJECTION, POWDER, LYOPHILIZED, FOR SOLUTION INTRAVENOUS at 13:58

## 2019-06-15 RX ADMIN — OXYCODONE HYDROCHLORIDE AND ACETAMINOPHEN 1 TABLET: 5; 325 TABLET ORAL at 06:36

## 2019-06-15 RX ADMIN — VANCOMYCIN HYDROCHLORIDE 1500 MG: 1 INJECTION, POWDER, LYOPHILIZED, FOR SOLUTION INTRAVENOUS at 05:58

## 2019-06-15 RX ADMIN — OXYCODONE HYDROCHLORIDE AND ACETAMINOPHEN 1 TABLET: 5; 325 TABLET ORAL at 16:09

## 2019-06-15 RX ADMIN — SODIUM CHLORIDE: 9 INJECTION, SOLUTION INTRAVENOUS at 08:47

## 2019-06-15 RX ADMIN — OXYCODONE HYDROCHLORIDE AND ACETAMINOPHEN 1 TABLET: 5; 325 TABLET ORAL at 20:12

## 2019-06-15 ASSESSMENT — PAIN SCALES - GENERAL
PAINLEVEL_OUTOF10: 8
PAINLEVEL_OUTOF10: 7
PAINLEVEL_OUTOF10: 7
PAINLEVEL_OUTOF10: 6
PAINLEVEL_OUTOF10: 6
PAINLEVEL_OUTOF10: 7
PAINLEVEL_OUTOF10: 7
PAINLEVEL_OUTOF10: 5
PAINLEVEL_OUTOF10: 7
PAINLEVEL_OUTOF10: 5
PAINLEVEL_OUTOF10: 6
PAINLEVEL_OUTOF10: 8

## 2019-06-15 ASSESSMENT — PAIN DESCRIPTION - PAIN TYPE
TYPE: ACUTE PAIN
TYPE: CHRONIC PAIN

## 2019-06-15 ASSESSMENT — PAIN DESCRIPTION - LOCATION
LOCATION: LEG

## 2019-06-15 ASSESSMENT — PAIN DESCRIPTION - ORIENTATION
ORIENTATION: RIGHT

## 2019-06-15 ASSESSMENT — PAIN DESCRIPTION - DESCRIPTORS: DESCRIPTORS: THROBBING

## 2019-06-15 NOTE — PROGRESS NOTES
Pt sitting up in bed watching TV. Pt states pain in leg 6/10. Respirations are even and unlabored. Lungs are clear diminished. Pedal pulse is palpable. ACE wrap intact. Call light in reach. Will continue to monitor.

## 2019-06-15 NOTE — PROGRESS NOTES
Sitting up in bed with right leg hanging out of bed. Discussed need to elevate leg as he states that swelling is worse today than yesterday. \"Oh , yeah. I forgot. Its just so much more comfortable to sit this way. \" Lays back in bed. RLE elevated on pillow. Denies further needs. Call light in reach.

## 2019-06-15 NOTE — PROGRESS NOTES
Hospitalist Progress Note  6/15/2019 8:45 AM  Subjective:   Admit Date: 6/13/2019  PCP: No primary care provider on file. Interval History:     Shunt is complaining of more swelling in the right lower extremity. Last night his blood pressure was high and he was treated with IV hydralazine. Unfortunately his blood pressure dropped and the patient started feeling lightheaded and \"weird\". He also had his right lower extremity hanging out of the bed. He has no fevers, chills. States pain in the right lower extremity is better, redness still persisting. Pain is better controlled with Percocet  Denies headache, chest pain, admits to some shortness of breath, has no nausea or diarrhea      Diet: DIET GENERAL;  Medications:   Scheduled Meds:   vancomycin        vancomycin        fenofibrate  160 mg Oral Daily    metoprolol tartrate  75 mg Oral BID    triamterene-hydrochlorothiazide  1 tablet Oral Daily    levofloxacin  500 mg Intravenous Q24H    sodium chloride flush  10 mL Intravenous 2 times per day    enoxaparin  40 mg Subcutaneous Daily    vancomycin  1,500 mg Intravenous Q8H    vancomycin (VANCOCIN) intermittent dosing (placeholder)   Other RX Placeholder     Continuous Infusions:   sodium chloride       PRN Medications: oxyCODONE-acetaminophen, sodium chloride flush, magnesium hydroxide, ondansetron, acetaminophen    Objective:   Vitals: BP (!) 92/45   Pulse 66   Temp 97.7 °F (36.5 °C) (Oral)   Resp 18   Ht 6' 3\" (1.905 m)   Wt (!) 355 lb (161 kg)   SpO2 96%   BMI 44.37 kg/m²   BMI: Body mass index is 44.37 kg/m².     CBC:   Recent Labs     06/13/19  1615 06/14/19  0603   WBC 6.3 5.6   HGB 14.6 14.4    167     BMP:    Recent Labs     06/13/19  1615 06/14/19  0603    138   K 4.1 3.9    98   CO2 27 28   BUN 12 13   CREATININE 0.74 0.75   GLUCOSE 90 111*     Hepatic:   Recent Labs     06/13/19 1615   AST 61*   ALT 86*   BILITOT 0.49   ALKPHOS 95       Physical

## 2019-06-16 LAB
ABSOLUTE EOS #: 0.4 K/UL (ref 0–0.4)
ABSOLUTE IMMATURE GRANULOCYTE: ABNORMAL K/UL (ref 0–0.3)
ABSOLUTE LYMPH #: 1.2 K/UL (ref 1–4.8)
ABSOLUTE MONO #: 0.7 K/UL (ref 0–1)
ANION GAP SERPL CALCULATED.3IONS-SCNC: 12 MMOL/L (ref 9–17)
BASOPHILS # BLD: 1 % (ref 0–2)
BASOPHILS ABSOLUTE: 0 K/UL (ref 0–0.2)
BUN BLDV-MCNC: 12 MG/DL (ref 6–20)
BUN/CREAT BLD: 16 (ref 9–20)
CALCIUM SERPL-MCNC: 9.4 MG/DL (ref 8.6–10.4)
CHLORIDE BLD-SCNC: 101 MMOL/L (ref 98–107)
CO2: 26 MMOL/L (ref 20–31)
CREAT SERPL-MCNC: 0.76 MG/DL (ref 0.7–1.2)
DIFFERENTIAL TYPE: YES
EOSINOPHILS RELATIVE PERCENT: 8 % (ref 0–5)
GFR AFRICAN AMERICAN: >60 ML/MIN
GFR NON-AFRICAN AMERICAN: >60 ML/MIN
GFR SERPL CREATININE-BSD FRML MDRD: ABNORMAL ML/MIN/{1.73_M2}
GFR SERPL CREATININE-BSD FRML MDRD: ABNORMAL ML/MIN/{1.73_M2}
GLUCOSE BLD-MCNC: 127 MG/DL (ref 70–99)
HCT VFR BLD CALC: 42.9 % (ref 41–53)
HEMOGLOBIN: 14.3 G/DL (ref 13.5–17.5)
IMMATURE GRANULOCYTES: ABNORMAL %
LYMPHOCYTES # BLD: 24 % (ref 13–44)
MCH RBC QN AUTO: 27 PG (ref 26–34)
MCHC RBC AUTO-ENTMCNC: 33.2 G/DL (ref 31–37)
MCV RBC AUTO: 81.4 FL (ref 80–100)
MONOCYTES # BLD: 14 % (ref 5–9)
NRBC AUTOMATED: ABNORMAL PER 100 WBC
PDW BLD-RTO: 14.9 % (ref 12.1–15.2)
PLATELET # BLD: 172 K/UL (ref 140–450)
PLATELET ESTIMATE: ABNORMAL
PMV BLD AUTO: ABNORMAL FL (ref 6–12)
POTASSIUM SERPL-SCNC: 3.8 MMOL/L (ref 3.7–5.3)
RBC # BLD: 5.27 M/UL (ref 4.5–5.9)
RBC # BLD: ABNORMAL 10*6/UL
SEG NEUTROPHILS: 53 % (ref 39–75)
SEGMENTED NEUTROPHILS ABSOLUTE COUNT: 2.8 K/UL (ref 2.1–6.5)
SODIUM BLD-SCNC: 139 MMOL/L (ref 135–144)
WBC # BLD: 5.1 K/UL (ref 3.5–11)
WBC # BLD: ABNORMAL 10*3/UL

## 2019-06-16 PROCEDURE — 94761 N-INVAS EAR/PLS OXIMETRY MLT: CPT

## 2019-06-16 PROCEDURE — 85025 COMPLETE CBC W/AUTO DIFF WBC: CPT

## 2019-06-16 PROCEDURE — 6360000002 HC RX W HCPCS: Performed by: INTERNAL MEDICINE

## 2019-06-16 PROCEDURE — 80048 BASIC METABOLIC PNL TOTAL CA: CPT

## 2019-06-16 PROCEDURE — 1200000000 HC SEMI PRIVATE

## 2019-06-16 PROCEDURE — 36415 COLL VENOUS BLD VENIPUNCTURE: CPT

## 2019-06-16 PROCEDURE — 6370000000 HC RX 637 (ALT 250 FOR IP): Performed by: INTERNAL MEDICINE

## 2019-06-16 PROCEDURE — 2580000003 HC RX 258: Performed by: INTERNAL MEDICINE

## 2019-06-16 RX ORDER — FUROSEMIDE 10 MG/ML
20 INJECTION INTRAMUSCULAR; INTRAVENOUS ONCE
Status: COMPLETED | OUTPATIENT
Start: 2019-06-16 | End: 2019-06-16

## 2019-06-16 RX ORDER — VANCOMYCIN HYDROCHLORIDE 500 MG/10ML
INJECTION, POWDER, LYOPHILIZED, FOR SOLUTION INTRAVENOUS
Status: DISPENSED
Start: 2019-06-16 | End: 2019-06-17

## 2019-06-16 RX ORDER — VANCOMYCIN HYDROCHLORIDE 1 G/20ML
INJECTION, POWDER, LYOPHILIZED, FOR SOLUTION INTRAVENOUS
Status: DISPENSED
Start: 2019-06-16 | End: 2019-06-17

## 2019-06-16 RX ORDER — VANCOMYCIN HYDROCHLORIDE 500 MG/10ML
INJECTION, POWDER, LYOPHILIZED, FOR SOLUTION INTRAVENOUS
Status: DISPENSED
Start: 2019-06-16 | End: 2019-06-16

## 2019-06-16 RX ORDER — VANCOMYCIN HYDROCHLORIDE 1 G/20ML
INJECTION, POWDER, LYOPHILIZED, FOR SOLUTION INTRAVENOUS
Status: DISPENSED
Start: 2019-06-16 | End: 2019-06-16

## 2019-06-16 RX ADMIN — Medication 10 ML: at 12:07

## 2019-06-16 RX ADMIN — OXYCODONE HYDROCHLORIDE AND ACETAMINOPHEN 1 TABLET: 5; 325 TABLET ORAL at 05:35

## 2019-06-16 RX ADMIN — TRIAMTERENE AND HYDROCHLOROTHIAZIDE 1 TABLET: 37.5; 25 TABLET ORAL at 10:10

## 2019-06-16 RX ADMIN — FENOFIBRATE 160 MG: 160 TABLET ORAL at 10:10

## 2019-06-16 RX ADMIN — Medication 10 ML: at 13:27

## 2019-06-16 RX ADMIN — VANCOMYCIN HYDROCHLORIDE 1500 MG: 1 INJECTION, POWDER, LYOPHILIZED, FOR SOLUTION INTRAVENOUS at 05:37

## 2019-06-16 RX ADMIN — ENOXAPARIN SODIUM 40 MG: 40 INJECTION SUBCUTANEOUS at 10:10

## 2019-06-16 RX ADMIN — METOPROLOL TARTRATE 75 MG: 50 TABLET ORAL at 10:10

## 2019-06-16 RX ADMIN — Medication 10 ML: at 20:17

## 2019-06-16 RX ADMIN — METOPROLOL TARTRATE 75 MG: 50 TABLET ORAL at 20:25

## 2019-06-16 RX ADMIN — OXYCODONE HYDROCHLORIDE AND ACETAMINOPHEN 1 TABLET: 5; 325 TABLET ORAL at 20:15

## 2019-06-16 RX ADMIN — VANCOMYCIN HYDROCHLORIDE 1500 MG: 1 INJECTION, POWDER, LYOPHILIZED, FOR SOLUTION INTRAVENOUS at 13:27

## 2019-06-16 RX ADMIN — FUROSEMIDE 20 MG: 10 INJECTION, SOLUTION INTRAMUSCULAR; INTRAVENOUS at 12:06

## 2019-06-16 RX ADMIN — Medication 10 ML: at 10:11

## 2019-06-16 RX ADMIN — OXYCODONE HYDROCHLORIDE AND ACETAMINOPHEN 1 TABLET: 5; 325 TABLET ORAL at 00:33

## 2019-06-16 RX ADMIN — OXYCODONE HYDROCHLORIDE AND ACETAMINOPHEN 1 TABLET: 5; 325 TABLET ORAL at 14:52

## 2019-06-16 RX ADMIN — OXYCODONE HYDROCHLORIDE AND ACETAMINOPHEN 1 TABLET: 5; 325 TABLET ORAL at 10:10

## 2019-06-16 RX ADMIN — LEVOFLOXACIN 500 MG: 5 INJECTION, SOLUTION INTRAVENOUS at 20:16

## 2019-06-16 RX ADMIN — VANCOMYCIN HYDROCHLORIDE 1500 MG: 1 INJECTION, POWDER, LYOPHILIZED, FOR SOLUTION INTRAVENOUS at 21:25

## 2019-06-16 ASSESSMENT — PAIN SCALES - GENERAL
PAINLEVEL_OUTOF10: 8
PAINLEVEL_OUTOF10: 7
PAINLEVEL_OUTOF10: 8
PAINLEVEL_OUTOF10: 8
PAINLEVEL_OUTOF10: 5
PAINLEVEL_OUTOF10: 7
PAINLEVEL_OUTOF10: 7
PAINLEVEL_OUTOF10: 5
PAINLEVEL_OUTOF10: 4
PAINLEVEL_OUTOF10: 6
PAINLEVEL_OUTOF10: 7

## 2019-06-16 ASSESSMENT — PAIN DESCRIPTION - ORIENTATION
ORIENTATION: RIGHT
ORIENTATION: RIGHT

## 2019-06-16 ASSESSMENT — PAIN DESCRIPTION - LOCATION
LOCATION: LEG
LOCATION: LEG

## 2019-06-16 ASSESSMENT — PAIN DESCRIPTION - PAIN TYPE
TYPE: ACUTE PAIN
TYPE: ACUTE PAIN

## 2019-06-16 NOTE — PROGRESS NOTES
States that his IV site is starting to hurt. IV side warm and red. Dr Sagrario Cedeno notified. Requests ED restart IV with ultrasound. Supervisor notified.

## 2019-06-17 VITALS
WEIGHT: 315 LBS | RESPIRATION RATE: 18 BRPM | HEART RATE: 66 BPM | HEIGHT: 75 IN | OXYGEN SATURATION: 96 % | DIASTOLIC BLOOD PRESSURE: 57 MMHG | BODY MASS INDEX: 39.17 KG/M2 | SYSTOLIC BLOOD PRESSURE: 127 MMHG | TEMPERATURE: 97.9 F

## 2019-06-17 LAB
ANION GAP SERPL CALCULATED.3IONS-SCNC: 10 MMOL/L (ref 9–17)
BUN BLDV-MCNC: 14 MG/DL (ref 6–20)
BUN/CREAT BLD: 17 (ref 9–20)
CALCIUM SERPL-MCNC: 9.5 MG/DL (ref 8.6–10.4)
CHLORIDE BLD-SCNC: 100 MMOL/L (ref 98–107)
CO2: 30 MMOL/L (ref 20–31)
CREAT SERPL-MCNC: 0.83 MG/DL (ref 0.7–1.2)
GFR AFRICAN AMERICAN: >60 ML/MIN
GFR NON-AFRICAN AMERICAN: >60 ML/MIN
GFR SERPL CREATININE-BSD FRML MDRD: NORMAL ML/MIN/{1.73_M2}
GFR SERPL CREATININE-BSD FRML MDRD: NORMAL ML/MIN/{1.73_M2}
GLUCOSE BLD-MCNC: 93 MG/DL (ref 70–99)
POTASSIUM SERPL-SCNC: 3.9 MMOL/L (ref 3.7–5.3)
SODIUM BLD-SCNC: 140 MMOL/L (ref 135–144)

## 2019-06-17 PROCEDURE — 6360000002 HC RX W HCPCS: Performed by: INTERNAL MEDICINE

## 2019-06-17 PROCEDURE — 94761 N-INVAS EAR/PLS OXIMETRY MLT: CPT

## 2019-06-17 PROCEDURE — 6370000000 HC RX 637 (ALT 250 FOR IP): Performed by: INTERNAL MEDICINE

## 2019-06-17 PROCEDURE — 80048 BASIC METABOLIC PNL TOTAL CA: CPT

## 2019-06-17 PROCEDURE — 2580000003 HC RX 258: Performed by: INTERNAL MEDICINE

## 2019-06-17 PROCEDURE — 36415 COLL VENOUS BLD VENIPUNCTURE: CPT

## 2019-06-17 RX ORDER — FENOFIBRATE 160 MG/1
160 TABLET ORAL DAILY
Qty: 30 TABLET | Refills: 3 | Status: SHIPPED | OUTPATIENT
Start: 2019-06-18 | End: 2019-09-12 | Stop reason: SDUPTHER

## 2019-06-17 RX ORDER — TRIAMTERENE AND HYDROCHLOROTHIAZIDE 37.5; 25 MG/1; MG/1
1 TABLET ORAL DAILY
Qty: 30 TABLET | Refills: 3 | Status: SHIPPED | OUTPATIENT
Start: 2019-06-17 | End: 2019-09-12 | Stop reason: SDUPTHER

## 2019-06-17 RX ORDER — DOXYCYCLINE HYCLATE 100 MG
100 TABLET ORAL 2 TIMES DAILY
Qty: 20 TABLET | Refills: 0 | Status: SHIPPED | OUTPATIENT
Start: 2019-06-17 | End: 2019-06-27

## 2019-06-17 RX ORDER — OXYCODONE HYDROCHLORIDE AND ACETAMINOPHEN 5; 325 MG/1; MG/1
1 TABLET ORAL EVERY 6 HOURS PRN
Qty: 12 TABLET | Refills: 0 | Status: SHIPPED | OUTPATIENT
Start: 2019-06-17 | End: 2019-06-20

## 2019-06-17 RX ORDER — VANCOMYCIN HYDROCHLORIDE 500 MG/10ML
INJECTION, POWDER, LYOPHILIZED, FOR SOLUTION INTRAVENOUS
Status: DISCONTINUED
Start: 2019-06-17 | End: 2019-06-17 | Stop reason: HOSPADM

## 2019-06-17 RX ORDER — VANCOMYCIN HYDROCHLORIDE 1 G/20ML
INJECTION, POWDER, LYOPHILIZED, FOR SOLUTION INTRAVENOUS
Status: DISCONTINUED
Start: 2019-06-17 | End: 2019-06-17 | Stop reason: HOSPADM

## 2019-06-17 RX ADMIN — Medication 10 ML: at 08:47

## 2019-06-17 RX ADMIN — OXYCODONE HYDROCHLORIDE AND ACETAMINOPHEN 1 TABLET: 5; 325 TABLET ORAL at 05:43

## 2019-06-17 RX ADMIN — VANCOMYCIN HYDROCHLORIDE 1500 MG: 1 INJECTION, POWDER, LYOPHILIZED, FOR SOLUTION INTRAVENOUS at 05:44

## 2019-06-17 RX ADMIN — ENOXAPARIN SODIUM 40 MG: 40 INJECTION SUBCUTANEOUS at 08:46

## 2019-06-17 RX ADMIN — FENOFIBRATE 160 MG: 160 TABLET ORAL at 08:46

## 2019-06-17 RX ADMIN — TRIAMTERENE AND HYDROCHLOROTHIAZIDE 1 TABLET: 37.5; 25 TABLET ORAL at 08:46

## 2019-06-17 RX ADMIN — OXYCODONE HYDROCHLORIDE AND ACETAMINOPHEN 1 TABLET: 5; 325 TABLET ORAL at 01:22

## 2019-06-17 RX ADMIN — METOPROLOL TARTRATE 75 MG: 50 TABLET ORAL at 08:46

## 2019-06-17 RX ADMIN — OXYCODONE HYDROCHLORIDE AND ACETAMINOPHEN 1 TABLET: 5; 325 TABLET ORAL at 09:46

## 2019-06-17 ASSESSMENT — PAIN SCALES - GENERAL
PAINLEVEL_OUTOF10: 8
PAINLEVEL_OUTOF10: 7
PAINLEVEL_OUTOF10: 0
PAINLEVEL_OUTOF10: 8
PAINLEVEL_OUTOF10: 7
PAINLEVEL_OUTOF10: 8

## 2019-06-17 ASSESSMENT — PAIN DESCRIPTION - DESCRIPTORS: DESCRIPTORS: THROBBING

## 2019-06-17 ASSESSMENT — PAIN DESCRIPTION - ORIENTATION: ORIENTATION: RIGHT

## 2019-06-17 ASSESSMENT — PAIN DESCRIPTION - LOCATION: LOCATION: LEG

## 2019-06-17 ASSESSMENT — PAIN DESCRIPTION - PAIN TYPE: TYPE: ACUTE PAIN

## 2019-06-17 ASSESSMENT — PAIN DESCRIPTION - FREQUENCY: FREQUENCY: CONTINUOUS

## 2019-06-17 NOTE — PROGRESS NOTES
Hospitalist Progress Note  6/17/2019 5:25 AM  Subjective:   Admit Date: 6/13/2019  PCP: No primary care provider on file. Interval History: Kvng Lindsay states he is feeling a lot better. The pain in his leg has improved but not resolved. He denies any fever or night sweats. Appetite is good with no nausea. He states the redness in his lower belly has significantly improved as well as his leg. He states he hopes to go home today and willing to follow up with Dr. Jacquie Worrell. Diet: DIET GENERAL;  Medications:   Scheduled Meds:   vancomycin        vancomycin        vancomycin        vancomycin        vancomycin        vancomycin        fenofibrate  160 mg Oral Daily    metoprolol tartrate  75 mg Oral BID    triamterene-hydrochlorothiazide  1 tablet Oral Daily    levofloxacin  500 mg Intravenous Q24H    sodium chloride flush  10 mL Intravenous 2 times per day    enoxaparin  40 mg Subcutaneous Daily    vancomycin  1,500 mg Intravenous Q8H    vancomycin (VANCOCIN) intermittent dosing (placeholder)   Other RX Placeholder     Continuous Infusions:  CBC:   Recent Labs     06/14/19  0603 06/16/19  0608   WBC 5.6 5.1   HGB 14.4 14.3    172     BMP:    Recent Labs     06/14/19  0603 06/16/19  0608    139   K 3.9 3.8   CL 98 101   CO2 28 26   BUN 13 12   CREATININE 0.75 0.76   GLUCOSE 111* 127*     Hepatic: No results for input(s): AST, ALT, ALB, BILITOT, ALKPHOS in the last 72 hours. Troponin: No results for input(s): TROPONINI in the last 72 hours. BNP: No results for input(s): BNP in the last 72 hours. Lipids: No results for input(s): CHOL, HDL in the last 72 hours. Invalid input(s): LDLCALCU  INR: No results for input(s): INR in the last 72 hours.       Objective:   Vitals: BP (!) 160/85   Pulse 72   Temp 98.8 °F (37.1 °C) (Oral)   Resp 18   Ht 6' 3\" (1.905 m)   Wt (!) 355 lb (161 kg)   SpO2 96%   BMI 44.37 kg/m²   General appearance: alert and cooperative with exam  HEENT: Head: Normocephalic, no lesions, without obvious abnormality. Eye: Normal external eye, conjunctiva, lids cornea, STEPHANIE. Nose: Normal external nose, mucus membranes and septum. Neck: no adenopathy and supple, symmetrical, trachea midline  Lungs: clear to auscultation bilaterally  Heart: regular rate and rhythm, S1, S2 normal and cardiac sounds are distant secondary to patient's size. Abdomen: +BS, morbidly obese. Mild erythem over the lower panus. No pain with palpation. Extremities: Right leg with erythema over the lower leg below the knee with 2 + edema. Left AKA. Neurologic: Mental status: Alert, oriented, thought content appropriate    Assessment and Plan:   1. Right leg cellulitis with panniculitis with history of MRSA infection - significant improvement on IV Vancomycin and Levaquin. No fever or elevated WBC. US negative for DVT. 2. Chronic venous insufficiency with leg edema - discussed the importance of leg elevation and use of compression stocking daily. 3. HTN - BP elevated but refusing IV medication secondary to fear of dropping to low. 4. H/O IV drug use (patient denies current use of drugs). 5. H/O Endocarditis - S/P tricuspid valve replacement. 6. H/O Left lung lobectomy secondary to MRSA in 2010. 7. Smoking history - quit 3 months ago. 8.  Morbid obesity. Plan:  1. Discharge home on double coverage for MRSA (Rifampin / Doxycyline). 2.  Close follow up with Dr. Hansel Dickey.           Patient Active Problem List:     Chest pain     Neck pain     Headache     Cellulitis and abscess of forearm     Cellulitis, leg     Cellulitis of lower leg      Zak Melendez MD  [facility] Hospitalist

## 2019-06-17 NOTE — DISCHARGE SUMMARY
None.    Significant Diagnostic Studies:  Labs, Venous US (Negative for DVT)    Treatments:   IV Levaquin, IV Vancomycin, Percocet for pain, ACE wrap lower leg, IV Lasix x 1. Disposition:   Home. Discharge Instructions: Take Doxycycline 100 mg two times a day x 10 days. Take Rifampin 300 mg two times a day x 10 days. Take Percocet 5/325:  1 tablet by mouth every 6 hours as needed for pain. Resume previous home medication. Keep right leg elevated whenever sitting and use a knee high compression stocking on the right leg daily. Activity:  As tolerated. Diet:  General    Follow up with Dr. Aruna Peter in 1 week. Discharge time =  35 minutes.      Signed:  Yoshi Melendez  6/17/2019, 5:43 AM

## 2019-06-18 ENCOUNTER — TELEPHONE (OUTPATIENT)
Dept: FAMILY MEDICINE CLINIC | Age: 54
End: 2019-06-18

## 2019-06-18 NOTE — TELEPHONE ENCOUNTER
[facility] Transitions Initial Follow Up Call    Outreach made within 2 business days of discharge: Yes    Patient: Kamron Lindo Patient : 1965   MRN: [id number]  Reason for Admission: Celluitis of right lower extremity   Discharge Date: 19       Spoke with:  Victory Bumpers    Discharge department/facility: [address] [place] [address] Interactive Patient Contact:  Was patient able to fill all prescriptions: Yes  Was patient instructed to bring all medications to the follow-up visit: Yes  Is patient taking all medications as directed in the discharge summary?  Yes  Does patient understand their discharge instructions: Yes  Does patient have questions or concerns that need addressed prior to 7-14 day follow up office visit: no    Scheduled appointment with PCP within 7-14 days    Follow Up  Future Appointments   Date Time Provider Yajaira Sahni   2019  1:15 PM Pam Vargas, [address], [place]

## 2019-06-19 LAB
CULTURE: NORMAL
CULTURE: NORMAL
Lab: NORMAL
Lab: NORMAL
SPECIMEN DESCRIPTION: NORMAL
SPECIMEN DESCRIPTION: NORMAL

## 2019-06-24 ENCOUNTER — OFFICE VISIT (OUTPATIENT)
Dept: FAMILY MEDICINE CLINIC | Age: 54
End: 2019-06-24
Payer: MEDICARE

## 2019-06-24 VITALS
OXYGEN SATURATION: 94 % | HEART RATE: 90 BPM | HEIGHT: 75 IN | BODY MASS INDEX: 43.88 KG/M2 | SYSTOLIC BLOOD PRESSURE: 120 MMHG | DIASTOLIC BLOOD PRESSURE: 82 MMHG

## 2019-06-24 DIAGNOSIS — I10 ESSENTIAL HYPERTENSION: ICD-10-CM

## 2019-06-24 DIAGNOSIS — E66.01 MORBID OBESITY WITH BMI OF 40.0-44.9, ADULT (HCC): ICD-10-CM

## 2019-06-24 DIAGNOSIS — L03.119 CELLULITIS OF LOWER LEG: Primary | ICD-10-CM

## 2019-06-24 DIAGNOSIS — Z12.11 COLON CANCER SCREENING: ICD-10-CM

## 2019-06-24 DIAGNOSIS — E78.5 HYPERLIPIDEMIA, UNSPECIFIED HYPERLIPIDEMIA TYPE: ICD-10-CM

## 2019-06-24 PROCEDURE — 99495 TRANSJ CARE MGMT MOD F2F 14D: CPT | Performed by: INTERNAL MEDICINE

## 2019-06-24 PROCEDURE — 1111F DSCHRG MED/CURRENT MED MERGE: CPT | Performed by: INTERNAL MEDICINE

## 2019-06-24 RX ORDER — METOPROLOL TARTRATE 50 MG/1
75 TABLET, FILM COATED ORAL 2 TIMES DAILY
Qty: 60 TABLET | Refills: 5 | Status: ON HOLD | OUTPATIENT
Start: 2019-06-24 | End: 2020-01-08 | Stop reason: SDUPTHER

## 2019-06-24 ASSESSMENT — PATIENT HEALTH QUESTIONNAIRE - PHQ9
SUM OF ALL RESPONSES TO PHQ9 QUESTIONS 1 & 2: 0
2. FEELING DOWN, DEPRESSED OR HOPELESS: 0
1. LITTLE INTEREST OR PLEASURE IN DOING THINGS: 0
SUM OF ALL RESPONSES TO PHQ QUESTIONS 1-9: 0
SUM OF ALL RESPONSES TO PHQ QUESTIONS 1-9: 0

## 2019-06-24 NOTE — PATIENT INSTRUCTIONS
SURVEY:    You may be receiving a survey from [employer] regarding your visit today. Please complete the survey to enable us to provide the highest quality of care to you and your family. If you cannot score us a very good on any question, please call the office to discuss how we could of made your experience a very good one. Thank you.

## 2019-06-26 NOTE — PROGRESS NOTES
Post-Discharge Transitional Care Management Services or Hospital Follow Up      Tristin Monique   YOB: 1965    Date of Office Visit:  6/24/2019  Date of Hospital Admission: 6/13/19  Date of Hospital Discharge: 6/17/19  Risk of hospital readmission (high >=14%.  Medium >=10%) :Readmission Risk Score: 9      Care management risk score Rising risk (score 2-5) and Complex Care (Scores >=6): 0     Non face to face  following discharge, date last encounter closed (first attempt may have been earlier): 6/18/2019  9:42 AM    Call initiated 2 business days of discharge: Yes    Patient Active Problem List   Diagnosis    Chest pain    Neck pain    Headache    Cellulitis and abscess of forearm    Cellulitis, leg    Cellulitis of lower leg       No Known Allergies    Medications listed as ordered at the time of discharge from hospital   Sruthi ARELLANO   Home Medication Instructions LEONOR:    Printed on:06/26/19 8643   Medication Information                      doxycycline hyclate (VIBRA-TABS) 100 MG tablet  Take 1 tablet by mouth 2 times daily for 10 days             fenofibrate 160 MG tablet  Take 1 tablet by mouth daily             metoprolol tartrate (LOPRESSOR) 50 MG tablet  Take 1.5 tablets by mouth 2 times daily             rifampin (RIFADIN) 300 MG capsule  Take 1 capsule by mouth 2 times daily for 10 days             triamterene-hydrochlorothiazide (MAXZIDE-25) 37.5-25 MG per tablet  Take 1 tablet by mouth daily                   Medications marked \"taking\" at this time  Outpatient Medications Marked as Taking for the 6/24/19 encounter (Office Visit) with Leonarda Pratt MD   Medication Sig Dispense Refill    metoprolol tartrate (LOPRESSOR) 50 MG tablet Take 1.5 tablets by mouth 2 times daily 60 tablet 5    doxycycline hyclate (VIBRA-TABS) 100 MG tablet Take 1 tablet by mouth 2 times daily for 10 days 20 tablet 0    rifampin (RIFADIN) 300 MG capsule Take 1 capsule by mouth 2 times daily for 10 days 20 capsule 0    fenofibrate 160 MG tablet Take 1 tablet by mouth daily 30 tablet 3    triamterene-hydrochlorothiazide (MAXZIDE-25) 37.5-25 MG per tablet Take 1 tablet by mouth daily 30 tablet 3        Medications patient taking as of now reconciled against medications ordered at time of hospital discharge: Yes    Chief Complaint   Patient presents with    Follow-Up from Hospital     was in hospital for celluitis on 06/13/19-06/17/19, states its doing better, not as painful as it was   [facility] Maintenance     Will be ok for colonscopy, but would like to wait until he is settle back in. History of Present illness - Follow up of Hospital diagnosis(es):     Inpatient course: Discharge summary reviewed- see chart. Mr. Corrie Carl presents to office to follow up for recent hospitalization for cellulitis of the RLE and panniculitis. He had negative RLE duplex. He was on IV Vancomycin and Levaquin in the hospital.Has a h/o MRSA infection in the past    He was discharged with Doxycycline and Rifampin and still taking it. States redness and swelling in legs is resolving. Has minimal pain. Reports no fever, chills, diarrhea, N/V. Has no new complaints today except concerned about his ongoing weight gain ever since he had left [facility]. Interval history/Current status:     A comprehensive review of systems was negative except for what was noted in the HPI. Vitals:    06/24/19 1332   BP: 120/82   Site: Right Upper Arm   Position: Sitting   Cuff Size: Large Adult   Pulse: 90   SpO2: 94%   Height: 6' 3\" (1.905 m)     Body mass index is 43.88 kg/m².    Wt Readings from Last 3 Encounters:   06/17/19 (!) 351 lb 1.6 oz (159.3 kg)   03/11/17 280 lb (127 kg)     BP Readings from Last 3 Encounters:   06/24/19 120/82   06/17/19 (!) 127/57   03/11/17 (!) 148/87        Physical Exam:  General Appearance: alert and oriented to person, place and time, well developed and well- nourished, in no acute

## 2019-09-12 ENCOUNTER — TELEPHONE (OUTPATIENT)
Dept: FAMILY MEDICINE CLINIC | Age: 54
End: 2019-09-12

## 2019-09-12 RX ORDER — TRIAMTERENE AND HYDROCHLOROTHIAZIDE 37.5; 25 MG/1; MG/1
1 TABLET ORAL DAILY
Qty: 90 TABLET | Refills: 0 | Status: ON HOLD | OUTPATIENT
Start: 2019-09-12 | End: 2020-01-08 | Stop reason: SDUPTHER

## 2019-09-12 RX ORDER — FENOFIBRATE 160 MG/1
160 TABLET ORAL DAILY
Qty: 90 TABLET | Refills: 0 | Status: ON HOLD | OUTPATIENT
Start: 2019-09-12 | End: 2020-01-08 | Stop reason: SDUPTHER

## 2020-01-06 ENCOUNTER — HOSPITAL ENCOUNTER (INPATIENT)
Age: 55
LOS: 2 days | Discharge: HOME | DRG: 191 | End: 2020-01-08
Attending: EMERGENCY MEDICINE | Admitting: INTERNAL MEDICINE
Payer: MEDICARE

## 2020-01-06 ENCOUNTER — APPOINTMENT (OUTPATIENT)
Dept: CT IMAGING | Age: 55
DRG: 191 | End: 2020-01-06
Payer: MEDICARE

## 2020-01-06 ENCOUNTER — APPOINTMENT (OUTPATIENT)
Dept: GENERAL RADIOLOGY | Age: 55
DRG: 191 | End: 2020-01-06
Payer: MEDICARE

## 2020-01-06 PROBLEM — J44.1 CHRONIC OBSTRUCTIVE PULMONARY DISEASE WITH ACUTE EXACERBATION (HCC): Status: ACTIVE | Noted: 2020-01-06

## 2020-01-06 PROBLEM — R04.2 HEMOPTYSIS: Status: ACTIVE | Noted: 2020-01-06

## 2020-01-06 PROBLEM — J44.1 COPD EXACERBATION (HCC): Status: ACTIVE | Noted: 2020-01-06

## 2020-01-06 LAB
ABSOLUTE EOS #: 0.3 K/UL (ref 0–0.4)
ABSOLUTE IMMATURE GRANULOCYTE: ABNORMAL K/UL (ref 0–0.3)
ABSOLUTE LYMPH #: 1.2 K/UL (ref 1–4.8)
ABSOLUTE MONO #: 0.6 K/UL (ref 0–1)
ALBUMIN SERPL-MCNC: 4.1 G/DL (ref 3.5–5.2)
ALBUMIN/GLOBULIN RATIO: ABNORMAL (ref 1–2.5)
ALP BLD-CCNC: 107 U/L (ref 40–129)
ALT SERPL-CCNC: 63 U/L (ref 5–41)
ANION GAP SERPL CALCULATED.3IONS-SCNC: 11 MMOL/L (ref 9–17)
AST SERPL-CCNC: 48 U/L
BASOPHILS # BLD: 0 % (ref 0–2)
BASOPHILS ABSOLUTE: 0 K/UL (ref 0–0.2)
BILIRUB SERPL-MCNC: 0.57 MG/DL (ref 0.3–1.2)
BNP INTERPRETATION: NORMAL
BUN BLDV-MCNC: 14 MG/DL (ref 6–20)
BUN/CREAT BLD: 20 (ref 9–20)
CALCIUM SERPL-MCNC: 10.1 MG/DL (ref 8.6–10.4)
CHLORIDE BLD-SCNC: 98 MMOL/L (ref 98–107)
CO2: 27 MMOL/L (ref 20–31)
CREAT SERPL-MCNC: 0.7 MG/DL (ref 0.7–1.2)
DIFFERENTIAL TYPE: YES
EOSINOPHILS RELATIVE PERCENT: 4 % (ref 0–5)
GFR AFRICAN AMERICAN: >60 ML/MIN
GFR NON-AFRICAN AMERICAN: >60 ML/MIN
GFR SERPL CREATININE-BSD FRML MDRD: ABNORMAL ML/MIN/{1.73_M2}
GFR SERPL CREATININE-BSD FRML MDRD: ABNORMAL ML/MIN/{1.73_M2}
GLUCOSE BLD-MCNC: 122 MG/DL (ref 70–99)
HCT VFR BLD CALC: 48.3 % (ref 41–53)
HEMOGLOBIN: 15.7 G/DL (ref 13.5–17.5)
IMMATURE GRANULOCYTES: ABNORMAL %
INR BLD: 1.1
LYMPHOCYTES # BLD: 15 % (ref 13–44)
MCH RBC QN AUTO: 25.9 PG (ref 26–34)
MCHC RBC AUTO-ENTMCNC: 32.5 G/DL (ref 31–37)
MCV RBC AUTO: 79.5 FL (ref 80–100)
MONOCYTES # BLD: 8 % (ref 5–9)
NRBC AUTOMATED: ABNORMAL PER 100 WBC
PDW BLD-RTO: 16.5 % (ref 12.1–15.2)
PLATELET # BLD: 232 K/UL (ref 140–450)
PLATELET ESTIMATE: ABNORMAL
PMV BLD AUTO: ABNORMAL FL (ref 6–12)
POTASSIUM SERPL-SCNC: 4.3 MMOL/L (ref 3.7–5.3)
PRO-BNP: 100 PG/ML
PROTHROMBIN TIME: 10.5 SEC (ref 9–11.6)
RBC # BLD: 6.08 M/UL (ref 4.5–5.9)
RBC # BLD: ABNORMAL 10*6/UL
SEG NEUTROPHILS: 73 % (ref 39–75)
SEGMENTED NEUTROPHILS ABSOLUTE COUNT: 6 K/UL (ref 2.1–6.5)
SODIUM BLD-SCNC: 136 MMOL/L (ref 135–144)
TOTAL PROTEIN: 9.1 G/DL (ref 6.4–8.3)
TROPONIN INTERP: NORMAL
TROPONIN T: <0.03 NG/ML
TROPONIN, HIGH SENSITIVITY: NORMAL NG/L (ref 0–22)
WBC # BLD: 8.2 K/UL (ref 3.5–11)
WBC # BLD: ABNORMAL 10*3/UL

## 2020-01-06 PROCEDURE — 93005 ELECTROCARDIOGRAM TRACING: CPT | Performed by: EMERGENCY MEDICINE

## 2020-01-06 PROCEDURE — 80053 COMPREHEN METABOLIC PANEL: CPT

## 2020-01-06 PROCEDURE — 2580000003 HC RX 258: Performed by: INTERNAL MEDICINE

## 2020-01-06 PROCEDURE — 96374 THER/PROPH/DIAG INJ IV PUSH: CPT

## 2020-01-06 PROCEDURE — 94664 DEMO&/EVAL PT USE INHALER: CPT

## 2020-01-06 PROCEDURE — 71250 CT THORAX DX C-: CPT

## 2020-01-06 PROCEDURE — 1200000000 HC SEMI PRIVATE

## 2020-01-06 PROCEDURE — 6370000000 HC RX 637 (ALT 250 FOR IP): Performed by: INTERNAL MEDICINE

## 2020-01-06 PROCEDURE — 6370000000 HC RX 637 (ALT 250 FOR IP): Performed by: EMERGENCY MEDICINE

## 2020-01-06 PROCEDURE — 6360000002 HC RX W HCPCS: Performed by: INTERNAL MEDICINE

## 2020-01-06 PROCEDURE — 99285 EMERGENCY DEPT VISIT HI MDM: CPT

## 2020-01-06 PROCEDURE — 83880 ASSAY OF NATRIURETIC PEPTIDE: CPT

## 2020-01-06 PROCEDURE — 85610 PROTHROMBIN TIME: CPT

## 2020-01-06 PROCEDURE — 85025 COMPLETE CBC W/AUTO DIFF WBC: CPT

## 2020-01-06 PROCEDURE — 2580000003 HC RX 258: Performed by: EMERGENCY MEDICINE

## 2020-01-06 PROCEDURE — 71045 X-RAY EXAM CHEST 1 VIEW: CPT

## 2020-01-06 PROCEDURE — 84484 ASSAY OF TROPONIN QUANT: CPT

## 2020-01-06 PROCEDURE — 6360000002 HC RX W HCPCS: Performed by: EMERGENCY MEDICINE

## 2020-01-06 RX ORDER — IPRATROPIUM BROMIDE AND ALBUTEROL SULFATE 2.5; .5 MG/3ML; MG/3ML
1 SOLUTION RESPIRATORY (INHALATION)
Status: DISCONTINUED | OUTPATIENT
Start: 2020-01-07 | End: 2020-01-08 | Stop reason: HOSPADM

## 2020-01-06 RX ORDER — SODIUM CHLORIDE 0.9 % (FLUSH) 0.9 %
10 SYRINGE (ML) INJECTION PRN
Status: DISCONTINUED | OUTPATIENT
Start: 2020-01-06 | End: 2020-01-08 | Stop reason: HOSPADM

## 2020-01-06 RX ORDER — IPRATROPIUM BROMIDE AND ALBUTEROL SULFATE 2.5; .5 MG/3ML; MG/3ML
1 SOLUTION RESPIRATORY (INHALATION) ONCE
Status: COMPLETED | OUTPATIENT
Start: 2020-01-06 | End: 2020-01-06

## 2020-01-06 RX ORDER — TRIAMTERENE AND HYDROCHLOROTHIAZIDE 37.5; 25 MG/1; MG/1
1 TABLET ORAL DAILY
Status: DISCONTINUED | OUTPATIENT
Start: 2020-01-07 | End: 2020-01-08 | Stop reason: HOSPADM

## 2020-01-06 RX ORDER — SODIUM CHLORIDE 0.9 % (FLUSH) 0.9 %
10 SYRINGE (ML) INJECTION EVERY 12 HOURS SCHEDULED
Status: DISCONTINUED | OUTPATIENT
Start: 2020-01-06 | End: 2020-01-08 | Stop reason: HOSPADM

## 2020-01-06 RX ORDER — ACETAMINOPHEN 325 MG/1
650 TABLET ORAL EVERY 4 HOURS PRN
Status: DISCONTINUED | OUTPATIENT
Start: 2020-01-06 | End: 2020-01-08 | Stop reason: HOSPADM

## 2020-01-06 RX ORDER — METHYLPREDNISOLONE SODIUM SUCCINATE 40 MG/ML
40 INJECTION, POWDER, LYOPHILIZED, FOR SOLUTION INTRAMUSCULAR; INTRAVENOUS ONCE
Status: COMPLETED | OUTPATIENT
Start: 2020-01-06 | End: 2020-01-06

## 2020-01-06 RX ORDER — ALBUTEROL SULFATE 2.5 MG/3ML
2.5 SOLUTION RESPIRATORY (INHALATION)
Status: DISCONTINUED | OUTPATIENT
Start: 2020-01-06 | End: 2020-01-08 | Stop reason: HOSPADM

## 2020-01-06 RX ORDER — FENOFIBRATE 160 MG/1
160 TABLET ORAL DAILY
Status: DISCONTINUED | OUTPATIENT
Start: 2020-01-07 | End: 2020-01-08 | Stop reason: HOSPADM

## 2020-01-06 RX ORDER — PREDNISONE 20 MG/1
40 TABLET ORAL DAILY
Status: DISCONTINUED | OUTPATIENT
Start: 2020-01-09 | End: 2020-01-07

## 2020-01-06 RX ORDER — ONDANSETRON 2 MG/ML
4 INJECTION INTRAMUSCULAR; INTRAVENOUS EVERY 6 HOURS PRN
Status: DISCONTINUED | OUTPATIENT
Start: 2020-01-06 | End: 2020-01-08 | Stop reason: HOSPADM

## 2020-01-06 RX ORDER — METHYLPREDNISOLONE SODIUM SUCCINATE 40 MG/ML
40 INJECTION, POWDER, LYOPHILIZED, FOR SOLUTION INTRAMUSCULAR; INTRAVENOUS EVERY 6 HOURS
Status: DISCONTINUED | OUTPATIENT
Start: 2020-01-06 | End: 2020-01-07

## 2020-01-06 RX ADMIN — Medication 10 ML: at 22:15

## 2020-01-06 RX ADMIN — ALBUTEROL SULFATE 2.5 MG: 2.5 SOLUTION RESPIRATORY (INHALATION) at 22:09

## 2020-01-06 RX ADMIN — METHYLPREDNISOLONE SODIUM SUCCINATE 40 MG: 40 INJECTION, POWDER, FOR SOLUTION INTRAMUSCULAR; INTRAVENOUS at 16:31

## 2020-01-06 RX ADMIN — IPRATROPIUM BROMIDE AND ALBUTEROL SULFATE 1 AMPULE: .5; 3 SOLUTION RESPIRATORY (INHALATION) at 16:44

## 2020-01-06 RX ADMIN — METOPROLOL TARTRATE 75 MG: 50 TABLET ORAL at 22:15

## 2020-01-06 RX ADMIN — METHYLPREDNISOLONE SODIUM SUCCINATE 40 MG: 40 INJECTION, POWDER, FOR SOLUTION INTRAMUSCULAR; INTRAVENOUS at 22:14

## 2020-01-06 RX ADMIN — CEFTRIAXONE SODIUM 1 G: 1 INJECTION, POWDER, FOR SOLUTION INTRAMUSCULAR; INTRAVENOUS at 16:26

## 2020-01-06 ASSESSMENT — PAIN SCALES - GENERAL
PAINLEVEL_OUTOF10: 0
PAINLEVEL_OUTOF10: 0

## 2020-01-06 NOTE — ED PROVIDER NOTES
eMERGENCY dEPARTMENT eNCOUnter      [address] [facility]    Chief Complaint   Patient presents with    Shortness of Breath     pt has been coughing and he seen some blood when he coughed    Cellulitis     right lower leg, open areas noted       HPI    Anuj Gandara is a 49 y.o. male who presentsto ED from home. By EMS. With complaint of weakness of breath and coughing up blood. Onset moderate. Patient states that she started coughing up blood yesterday. Today it got worse. 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 Patient is a smoker. Patient has a history of drug addiction, COPD. Patient states that he has been taking aspirin daily more than usual for headaches.     PAST MEDICAL HISTORY    Past Medical History:   Diagnosis Date    Addiction to drug [facility])     CAD (coronary artery disease)     [facility]     Hypertension     Kidney failure, acute (HCC)     due to mrsa, reversed    MRSA (methicillin resistant Staphylococcus aureus)     Patient requiring acute dialysis [facility]) July 2010    D/T organ failure from MRSA       SURGICAL HISTORY    Past Surgical History:   Procedure Laterality Date    ABOVE KNEE AMPUTATION Left     ANKLE SURGERY      right; s/p crush injury    CARDIAC SURGERY      valve replacement    JOINT REPLACEMENT  knee replacement, removed, MRSA    KNEE SURGERY      several on lt    LUNG SURGERY         CURRENT MEDICATIONS    Current Outpatient Rx   Medication Sig Dispense Refill    metoprolol tartrate (LOPRESSOR) 50 MG tablet Take 1.5 tablets by mouth 2 times daily 60 tablet 5    triamterene-hydrochlorothiazide (MAXZIDE-25) 37.5-25 MG per tablet Take 1 tablet by mouth daily 90 tablet 0    fenofibrate 160 MG tablet Take 1 tablet by mouth daily 90 tablet 0       ALLERGIES    No Known Allergies    FAMILY HISTORY    Family History   Problem Relation Age of Onset    Cancer Mother        SOCIAL (!) 150/97   Pulse 76   Temp 98.8 °F (37.1 °C) (Oral)   Resp 15   Ht 6' 3\" (1.905 m)   Wt (!) 374 lb (169.6 kg)   SpO2 94%   BMI 46.75 kg/m²    Constitutional:  Ill appearing male. Jarrod Quach HENT:  Normocephalic, Atraumatic, Bilateral external ears normal, Oropharynx dry. No oral exudates, Nose normal. Neck- Normal range of motion, No tenderness, Supple, No stridor. Eyes:  PERRL, EOMI, Conjunctiva normal, No discharge. Respiratory: Managed breath sounds bilaterally with rhonchi bilaterally  Cardiovascular:  Normal heart rate, Normal rhythm, No murmurs, No rubs, No gallops. GI:  Bowel sounds normal, Soft, No tenderness, No masses, No pulsatile masses. : External genitalia appear normal, No masses or lesions. No discharge. No CVA tenderness. Musculoskeletal:  R leg ulcer. L above the knee amputation due to Knee MRSA  Integument:  R leg ulcer, 4 cm in diameter. Shallow base skin ulcer. Lymphatic:  No lymphadenopathy noted. Neurologic:  Alert & oriented x 3, Normal motor function, Normal sensory function, No focal deficits noted. Psychiatric:  Affect normal, Judgment normal, Mood normal.     EKG    Sinus rhythm rate of 76, no acute ST changes    RADIOLOGY    CT CHEST WO CONTRAST   Final Result      1. Prior mediastinotomy with evidence of scarring in the left lung base related    to prior wedge resection. 2. Pulmonary nodule in the lingula measures up to 1 cm in diameter. Consider    PET/CT characterization of this nodule. If this is not performed would    recommend CT follow up in 3 months. 3. There is bilateral bronchitis but no organized pneumonia. 4. Indeterminate 8 cm mass at the upper pole right kidney. This could be a    proteinaceous cyst less likely solid nodule. Consider ultrasound   characterization. XR CHEST PORTABLE   Final Result      1.  Central airway cuffing and interstitial prominence more likely to represent    chronic bronchitis and interstitial fibrosis

## 2020-01-07 ENCOUNTER — APPOINTMENT (OUTPATIENT)
Dept: ULTRASOUND IMAGING | Age: 55
DRG: 191 | End: 2020-01-07
Payer: MEDICARE

## 2020-01-07 LAB
ABSOLUTE EOS #: 0 K/UL (ref 0–0.4)
ABSOLUTE IMMATURE GRANULOCYTE: ABNORMAL K/UL (ref 0–0.3)
ABSOLUTE LYMPH #: 1.1 K/UL (ref 1–4.8)
ABSOLUTE MONO #: 0.3 K/UL (ref 0–1)
ANION GAP SERPL CALCULATED.3IONS-SCNC: 12 MMOL/L (ref 9–17)
BASOPHILS # BLD: 0 % (ref 0–2)
BASOPHILS ABSOLUTE: 0 K/UL (ref 0–0.2)
BUN BLDV-MCNC: 21 MG/DL (ref 6–20)
BUN/CREAT BLD: 30 (ref 9–20)
CALCIUM SERPL-MCNC: 9.9 MG/DL (ref 8.6–10.4)
CHLORIDE BLD-SCNC: 100 MMOL/L (ref 98–107)
CO2: 25 MMOL/L (ref 20–31)
CREAT SERPL-MCNC: 0.69 MG/DL (ref 0.7–1.2)
DIFFERENTIAL TYPE: YES
EKG ATRIAL RATE: 76 BPM
EKG P AXIS: 15 DEGREES
EKG P-R INTERVAL: 194 MS
EKG Q-T INTERVAL: 432 MS
EKG QRS DURATION: 104 MS
EKG QTC CALCULATION (BAZETT): 486 MS
EKG R AXIS: 35 DEGREES
EKG T AXIS: 66 DEGREES
EKG VENTRICULAR RATE: 76 BPM
EOSINOPHILS RELATIVE PERCENT: 0 % (ref 0–5)
GFR AFRICAN AMERICAN: >60 ML/MIN
GFR NON-AFRICAN AMERICAN: >60 ML/MIN
GFR SERPL CREATININE-BSD FRML MDRD: ABNORMAL ML/MIN/{1.73_M2}
GFR SERPL CREATININE-BSD FRML MDRD: ABNORMAL ML/MIN/{1.73_M2}
GLUCOSE BLD-MCNC: 198 MG/DL (ref 70–99)
HCT VFR BLD CALC: 46.7 % (ref 41–53)
HEMOGLOBIN: 14.9 G/DL (ref 13.5–17.5)
IMMATURE GRANULOCYTES: ABNORMAL %
LYMPHOCYTES # BLD: 13 % (ref 13–44)
MCH RBC QN AUTO: 26 PG (ref 26–34)
MCHC RBC AUTO-ENTMCNC: 32 G/DL (ref 31–37)
MCV RBC AUTO: 81 FL (ref 80–100)
MONOCYTES # BLD: 4 % (ref 5–9)
NRBC AUTOMATED: ABNORMAL PER 100 WBC
PDW BLD-RTO: 16 % (ref 12.1–15.2)
PLATELET # BLD: 233 K/UL (ref 140–450)
PLATELET ESTIMATE: ABNORMAL
PMV BLD AUTO: ABNORMAL FL (ref 6–12)
POTASSIUM SERPL-SCNC: 4.3 MMOL/L (ref 3.7–5.3)
RBC # BLD: 5.76 M/UL (ref 4.5–5.9)
RBC # BLD: ABNORMAL 10*6/UL
SEG NEUTROPHILS: 83 % (ref 39–75)
SEGMENTED NEUTROPHILS ABSOLUTE COUNT: 7.4 K/UL (ref 2.1–6.5)
SODIUM BLD-SCNC: 137 MMOL/L (ref 135–144)
WBC # BLD: 8.9 K/UL (ref 3.5–11)
WBC # BLD: ABNORMAL 10*3/UL

## 2020-01-07 PROCEDURE — 93010 ELECTROCARDIOGRAM REPORT: CPT | Performed by: INTERNAL MEDICINE

## 2020-01-07 PROCEDURE — 6360000002 HC RX W HCPCS: Performed by: INTERNAL MEDICINE

## 2020-01-07 PROCEDURE — 2580000003 HC RX 258: Performed by: INTERNAL MEDICINE

## 2020-01-07 PROCEDURE — 94640 AIRWAY INHALATION TREATMENT: CPT

## 2020-01-07 PROCEDURE — 6370000000 HC RX 637 (ALT 250 FOR IP): Performed by: INTERNAL MEDICINE

## 2020-01-07 PROCEDURE — 76770 US EXAM ABDO BACK WALL COMP: CPT

## 2020-01-07 PROCEDURE — 90686 IIV4 VACC NO PRSV 0.5 ML IM: CPT | Performed by: INTERNAL MEDICINE

## 2020-01-07 PROCEDURE — 80048 BASIC METABOLIC PNL TOTAL CA: CPT

## 2020-01-07 PROCEDURE — 1200000000 HC SEMI PRIVATE

## 2020-01-07 PROCEDURE — 36415 COLL VENOUS BLD VENIPUNCTURE: CPT

## 2020-01-07 PROCEDURE — G0008 ADMIN INFLUENZA VIRUS VAC: HCPCS | Performed by: INTERNAL MEDICINE

## 2020-01-07 PROCEDURE — 85025 COMPLETE CBC W/AUTO DIFF WBC: CPT

## 2020-01-07 PROCEDURE — 94761 N-INVAS EAR/PLS OXIMETRY MLT: CPT

## 2020-01-07 RX ORDER — PREDNISONE 20 MG/1
40 TABLET ORAL DAILY
Status: DISCONTINUED | OUTPATIENT
Start: 2020-01-09 | End: 2020-01-08 | Stop reason: HOSPADM

## 2020-01-07 RX ORDER — LISINOPRIL 5 MG/1
5 TABLET ORAL DAILY
Status: DISCONTINUED | OUTPATIENT
Start: 2020-01-07 | End: 2020-01-08 | Stop reason: HOSPADM

## 2020-01-07 RX ORDER — METHYLPREDNISOLONE SODIUM SUCCINATE 40 MG/ML
40 INJECTION, POWDER, LYOPHILIZED, FOR SOLUTION INTRAMUSCULAR; INTRAVENOUS EVERY 12 HOURS
Status: DISCONTINUED | OUTPATIENT
Start: 2020-01-07 | End: 2020-01-08 | Stop reason: HOSPADM

## 2020-01-07 RX ORDER — GUAIFENESIN 600 MG/1
600 TABLET, EXTENDED RELEASE ORAL 2 TIMES DAILY
Status: DISCONTINUED | OUTPATIENT
Start: 2020-01-07 | End: 2020-01-08 | Stop reason: HOSPADM

## 2020-01-07 RX ADMIN — Medication 10 ML: at 21:17

## 2020-01-07 RX ADMIN — FENOFIBRATE 160 MG: 160 TABLET ORAL at 09:34

## 2020-01-07 RX ADMIN — TRIAMTERENE AND HYDROCHLOROTHIAZIDE 1 TABLET: 37.5; 25 TABLET ORAL at 09:34

## 2020-01-07 RX ADMIN — METHYLPREDNISOLONE SODIUM SUCCINATE 40 MG: 40 INJECTION, POWDER, FOR SOLUTION INTRAMUSCULAR; INTRAVENOUS at 05:04

## 2020-01-07 RX ADMIN — GUAIFENESIN 600 MG: 600 TABLET, EXTENDED RELEASE ORAL at 09:34

## 2020-01-07 RX ADMIN — IPRATROPIUM BROMIDE AND ALBUTEROL SULFATE 1 AMPULE: .5; 3 SOLUTION RESPIRATORY (INHALATION) at 21:40

## 2020-01-07 RX ADMIN — ALBUTEROL SULFATE 2.5 MG: 2.5 SOLUTION RESPIRATORY (INHALATION) at 01:11

## 2020-01-07 RX ADMIN — METOPROLOL TARTRATE 75 MG: 50 TABLET ORAL at 09:34

## 2020-01-07 RX ADMIN — CEFTRIAXONE SODIUM 1 G: 1 INJECTION, POWDER, FOR SOLUTION INTRAMUSCULAR; INTRAVENOUS at 17:08

## 2020-01-07 RX ADMIN — IPRATROPIUM BROMIDE AND ALBUTEROL SULFATE 1 AMPULE: .5; 3 SOLUTION RESPIRATORY (INHALATION) at 09:41

## 2020-01-07 RX ADMIN — ALBUTEROL SULFATE 2.5 MG: 2.5 SOLUTION RESPIRATORY (INHALATION) at 05:28

## 2020-01-07 RX ADMIN — Medication 10 ML: at 09:30

## 2020-01-07 RX ADMIN — LISINOPRIL 5 MG: 5 TABLET ORAL at 09:34

## 2020-01-07 RX ADMIN — METHYLPREDNISOLONE SODIUM SUCCINATE 40 MG: 40 INJECTION, POWDER, FOR SOLUTION INTRAMUSCULAR; INTRAVENOUS at 17:08

## 2020-01-07 RX ADMIN — GUAIFENESIN 600 MG: 600 TABLET, EXTENDED RELEASE ORAL at 20:56

## 2020-01-07 RX ADMIN — IPRATROPIUM BROMIDE AND ALBUTEROL SULFATE 1 AMPULE: .5; 3 SOLUTION RESPIRATORY (INHALATION) at 16:50

## 2020-01-07 RX ADMIN — METOPROLOL TARTRATE 75 MG: 50 TABLET ORAL at 20:56

## 2020-01-07 RX ADMIN — INFLUENZA A VIRUS A/BRISBANE/02/2018 IVR-190 (H1N1) ANTIGEN (PROPIOLACTONE INACTIVATED), INFLUENZA A VIRUS A/KANSAS/14/2017 X-327 (H3N2) ANTIGEN (PROPIOLACTONE INACTIVATED), INFLUENZA B VIRUS B/MARYLAND/15/2016 ANTIGEN (PROPIOLACTONE INACTIVATED), INFLUENZA B VIRUS B/PHUKET/3073/2013 BVR-1B ANTIGEN (PROPIOLACTONE INACTIVATED) 0.5 ML: 15; 15; 15; 15 INJECTION, SUSPENSION INTRAMUSCULAR at 09:34

## 2020-01-07 ASSESSMENT — PAIN SCALES - GENERAL
PAINLEVEL_OUTOF10: 0

## 2020-01-07 NOTE — PROGRESS NOTES
Palliative Care Notes    Reason for Consult:  goals of care and advanced care planning      Patient Active Problem List   Diagnosis    Chest pain    Neck pain    Headache    Cellulitis and abscess of forearm    Cellulitis, leg    Cellulitis of lower leg    Hemoptysis    [facility] obstructive pulmonary disease with acute exacerbation ([facility]    COPD exacerbation ([facility]       Advance Directives:  Code status: Full Code  Patient has capacity for medical decisions: yes  [address]: no  Living Will: no        Pain Management:  The patient is not having any pain. Symptom Management:  Are there any other symptoms that are distressing to the patient or family that needs addressed? Anxiety:  none                          Dyspnea:  chronic dyspnea                          Fatigue:  exercise intolerance and weight gain                          Bladder function:  Denies Issues                          Bowel function:  none    Other:  None     Spiritual history/needs:   notified: n/a    Palliative Performance Scale:  _x__70%  Ambulation reduced; Some disease; Can't do normal job or work; intake normal or reduced; can do full self care; LOC full  ___60%  Ambulation reduced; Significant disease; Can't do hobbies/housework; intake normal or reduced; occasional assist; LOC full/confusion  ___50%  Mainly sit/lie; Extensive disease; Can't do any work; Considerable assist; intake normal or reduced; LOC full/confusion  ___40%  Mainly in bed; Extensive disease; Mainly assist; intake normal or reduced; LOC full/confusion   ___30%  Bed Bound; Extensive disease; Total care; intake reduced; LOC full/confusion  ___20%  Bed Bound; Extensive disease; Total care; intake minimal; Drowsy/coma  ___10%  Bed Bound; Extensive disease; Total care; Mouth care only; Drowsy/coma  ___0       Death    Readmission Risk:  10%    Notes:   Toni Veliz is resting in bed for our visit.  He

## 2020-01-07 NOTE — PLAN OF CARE
Problem: Risk for Impaired Skin Integrity  Goal: Tissue integrity - skin and mucous membranes  Description  Structural intactness and normal physiological function of skin and  mucous membranes.   Outcome: Met This Shift     Problem: Infection:  Goal: Will remain free from infection  Description  Will remain free from infection  Outcome: Met This Shift     Problem: Safety:  Goal: Free from accidental physical injury  Description  Free from accidental physical injury  Outcome: Met This Shift  Goal: Free from intentional harm  Description  Free from intentional harm  Outcome: Met This Shift     Problem: Daily Care:  Goal: Daily care needs are met  Description  Daily care needs are met  Outcome: Met This Shift     Problem: Pain:  Goal: Patient's pain/discomfort is manageable  Description  Patient's pain/discomfort is manageable  Outcome: Met This Shift     Problem: Skin Integrity:  Goal: Skin integrity will stabilize  Description  Skin integrity will stabilize  Outcome: Met This Shift

## 2020-01-07 NOTE — PROGRESS NOTES
Quality flow rounds held on 1/7/20     Jai Nash is admitted for  Chronic obstructive pulmonary disease with acute exacerbation ([facility] [address] Length of stay 1. Education:    Needed Education: COPD-packet given and reviewed, meds, follow up, diet      Do you have any questions regarding your plan of care while at the hospital? denies    Planned Disposition:               [x]  Home when able                [] Swing Bed                [] ECF/SNF               [] Other/TBD    Barriers to Discharge:    Can you afford your medications? yes   Do you have transportation to follow up appointments? Drives self   Do you need any new equipment at home? none   Current equipment includes   wheelchair, shower chair    Do you have a living will or durable power of  for healthcare? no               If yes do we have a copy on file? n/a    Do you or your family have any questions or concerns we haven't already discussed? Denies    Lives with son, recently relocated to this area. Madge Sicard and writer present for rounding.

## 2020-01-07 NOTE — PROGRESS NOTES
SW met with pt to complete assessment during quality rounds. Pt is alert and oriented and cooperative during assessment. Pt is a 49year old male admitted for COPD with acute exacerbation. Pt and his son live together. Pt reports that his son is often gone as he is a . Pt states that they just moved back to the area from [place] 5 or 6 months ago. Pt was not utilizing any services prior to admission. Pt has a wheelchair and a shower chair at home. Pt reports that he drives himself to appointments. Pt is a full code and follows with Dr Katharina aGrnica as his PCP. Pt does not have advance directives and is not currently interested in completing these. Pt reports that he is able to obtain his medications without any concerns. Pt plans to return home at discharge. Pt identifies no discharge concerns or needs at this time. SW ill continue to follow and remain available as needed.  Jamie Singleton, [place] 1/7/2020

## 2020-01-07 NOTE — H&P
transferred to [place] in [facility]. Unfortunately no beds were available and the patient was hospitalized here. Tristan Shelby states that since admission he did not have any more episodes of hemoptysis. States this morning feels a lot better. Past Medical History:        Diagnosis Date    Addiction to drug [facility])     CAD (coronary artery disease)     COPD (chronic obstructive pulmonary disease) ([address]     Hypertension     Kidney failure, acute (HCC)     due to mrsa, reversed    MRSA (methicillin resistant Staphylococcus aureus)     Patient requiring acute dialysis [facility]) July 2010    D/T organ failure from MRSA       Past Surgical History:        Procedure Laterality Date    ABOVE KNEE AMPUTATION Left     ANKLE SURGERY      right; s/p crush injury    CARDIAC SURGERY      valve replacement    JOINT REPLACEMENT  knee replacement, removed, MRSA    KNEE SURGERY      several on lt    LUNG SURGERY         Home Medications:   No current facility-administered medications on file prior to encounter. Current Outpatient Medications on File Prior to Encounter   Medication Sig Dispense Refill    metoprolol tartrate (LOPRESSOR) 50 MG tablet Take 1.5 tablets by mouth 2 times daily 60 tablet 5    triamterene-hydrochlorothiazide (MAXZIDE-25) 37.5-25 MG per tablet Take 1 tablet by mouth daily 90 tablet 0    fenofibrate 160 MG tablet Take 1 tablet by mouth daily 90 tablet 0       Allergies:  Patient has no known allergies. Social History:    reports that he has been smoking cigarettes. He has been smoking about 1.00 pack per day. He has never used smokeless tobacco. He reports previous drug use. He reports that he does not drink alcohol. Family History:       Problem Relation Age of Onset    Cancer Mother        Review of systems:  Constitutional: no fever, no night sweats, positive for fatigue  Head: no headache, no head injury, no migranes. Eye: no blurring of vision, no double vision.   Ears: no Result Value Ref Range    Ventricular Rate 76 BPM    Atrial Rate 76 BPM    P-R Interval 194 ms    QRS Duration 104 ms    Q-T Interval 432 ms    QTc Calculation (Bazett) 486 ms    P Axis 15 degrees    R Axis 35 degrees    T Axis 66 degrees   Brain Natriuretic Peptide    Collection Time: 01/06/20  1:30 PM   Result Value Ref Range    Pro- <300 pg/mL    BNP Interpretation Pro-BNP Reference Range:    CBC Auto Differential    Collection Time: 01/06/20  1:30 PM   Result Value Ref Range    WBC 8.2 3.5 - 11.0 k/uL    RBC 6.08 (H) 4.5 - 5.9 m/uL    Hemoglobin 15.7 13.5 - 17.5 g/dL    Hematocrit 48.3 41 - 53 %    MCV 79.5 (L) 80 - 100 fL    MCH 25.9 (L) 26 - 34 pg    MCHC 32.5 31 - 37 g/dL    RDW 16.5 (H) 12.1 - 15.2 %    Platelets 600 022 - 124 k/uL    MPV NOT REPORTED 6.0 - 12.0 fL    NRBC Automated NOT REPORTED per 100 WBC    Differential Type YES     Seg Neutrophils 73 39 - 75 %    Lymphocytes 15 13 - 44 %    Monocytes 8 5 - 9 %    Eosinophils % 4 0 - 5 %    Basophils 0 0 - 2 %    Immature Granulocytes NOT REPORTED 0 %    Segs Absolute 6.00 2.1 - 6.5 k/uL    Absolute Lymph # 1.20 1.0 - 4.8 k/uL    Absolute Mono # 0.60 0.0 - 1.0 k/uL    Absolute Eos # 0.30 0.0 - 0.4 k/uL    Basophils Absolute 0.00 0.0 - 0.2 k/uL    Absolute Immature Granulocyte NOT REPORTED 0.00 - 0.30 k/uL    WBC Morphology NOT REPORTED     RBC Morphology NOT REPORTED     Platelet Estimate NOT REPORTED    Troponin    Collection Time: 01/06/20  1:30 PM   Result Value Ref Range    Troponin, High Sensitivity NOT REPORTED 0 - 22 ng/L    Troponin T <0.03 <0.03 ng/mL    Troponin Interp         Comprehensive Metabolic Panel    Collection Time: 01/06/20  1:30 PM   Result Value Ref Range    Glucose 122 (H) 70 - 99 mg/dL    BUN 14 6 - 20 mg/dL    CREATININE 0.70 0.70 - 1.20 mg/dL    Bun/Cre Ratio 20 9 - 20    Calcium 10.1 8.6 - 10.4 mg/dL    Sodium 136 135 - 144 mmol/L    Potassium 4.3 3.7 - 5.3 mmol/L    Chloride 98 98 - 107 mmol/L    CO2 27 20 - 31 mmol/L Anion Gap 11 9 - 17 mmol/L    Alkaline Phosphatase 107 40 - 129 U/L    ALT 63 (H) 5 - 41 U/L    AST 48 (H) <40 U/L    Total Bilirubin 0.57 0.30 - 1.20 mg/dL    Total Protein 9.1 (H) 6.4 - 8.3 g/dL    Alb 4.1 3.5 - 5.2 g/dL    Albumin/Globulin Ratio NOT REPORTED 1.0 - 2.5    GFR Non-African American >60 >60 mL/min    GFR African American >60 >60 mL/min    GFR Comment          GFR Staging NOT REPORTED    Protime-INR    Collection Time: 01/06/20  2:26 PM   Result Value Ref Range    Protime 10.5 9.0 - 11.6 sec    INR 1.1        Radiology:     Ct Chest Wo Contrast    Result Date: 1/6/2020  EXAMINATION: CT CHEST WO CONTRAST REASON FOR EXAM: Coughing up blood, smoker. COMPARISON: Chest x-rays including 1/6/2020. TECHNIQUE:  CT examination of the chest without IV contrast. Coronal and sagittal reformations were performed. Dose reduction techniques were achieved by using automated exposure control and/or adjustment of mA and/or kV according to patient size and/or use of iterative reconstruction technique. FINDINGS: There is prior mediastinotomy. There is a surgical staple line in the left lower lobe with some adjacent parenchymal scarring. This suggests prior wedge resection. There is also some scarring in the lingula. There is a 1 cm subpleural nodule in the lingula and a smaller adjacent 5 mm nodule. There is central and peripheral airway cuffing bilaterally. Some atelectasis or scar seen in the right costophrenic angle. Lymph nodes in the mediastinum measure up to 9 mm short axis compatible with benign reactive lymph nodes. There is no pericardial effusion or pleural effusion. The scan into the upper abdomen demonstrates partial visualization of a mass at the upper pole of the right kidney measuring 8 cm in diameter. This is 30 Hounsfield units. 1. Prior mediastinotomy with evidence of scarring in the left lung base related to prior wedge resection.  2. Pulmonary nodule in the lingula measures up to 1 cm in recommended to quit smoking. Medical Necessity: Inpatient admission is appropriate for this patient secondary to the need of IV antibiotics, IV steroids, frequent nebulizer treatment, further work-up of renal mass  Estimated length of stay: 2  days. The beneficiary may reasonably be expected to be discharged or transferred to a hospital within 96 hours after admission.     DVT prophylaxis:   [] Lovenox   [x] SCDs   [] SQ Heparin   [] Encourage ambulation, low risk for DVT, no chemical or mechanical    prophylaxis necessary      [] Already on Anticoagulation    Anticipated Disposition upon discharge:   [x] Home   [] Home with Home Health   [] [facility]   [] [address]      Electronically signed by Paige Ybarra MD on 1/7/2020 at 5:29 AM

## 2020-01-08 VITALS
RESPIRATION RATE: 20 BRPM | OXYGEN SATURATION: 93 % | WEIGHT: 315 LBS | BODY MASS INDEX: 39.17 KG/M2 | SYSTOLIC BLOOD PRESSURE: 142 MMHG | HEIGHT: 75 IN | TEMPERATURE: 97.7 F | DIASTOLIC BLOOD PRESSURE: 91 MMHG | HEART RATE: 72 BPM

## 2020-01-08 LAB
ABSOLUTE EOS #: 0 K/UL (ref 0–0.4)
ABSOLUTE IMMATURE GRANULOCYTE: ABNORMAL K/UL (ref 0–0.3)
ABSOLUTE LYMPH #: 2.4 K/UL (ref 1–4.8)
ABSOLUTE MONO #: 1.3 K/UL (ref 0–1)
BASOPHILS # BLD: 0 % (ref 0–2)
BASOPHILS ABSOLUTE: 0.1 K/UL (ref 0–0.2)
DIFFERENTIAL TYPE: YES
EOSINOPHILS RELATIVE PERCENT: 0 % (ref 0–5)
HCT VFR BLD CALC: 46.3 % (ref 41–53)
HEMOGLOBIN: 14.7 G/DL (ref 13.5–17.5)
IMMATURE GRANULOCYTES: ABNORMAL %
LYMPHOCYTES # BLD: 15 % (ref 13–44)
MCH RBC QN AUTO: 25.9 PG (ref 26–34)
MCHC RBC AUTO-ENTMCNC: 31.9 G/DL (ref 31–37)
MCV RBC AUTO: 81.3 FL (ref 80–100)
MONOCYTES # BLD: 8 % (ref 5–9)
NRBC AUTOMATED: ABNORMAL PER 100 WBC
PDW BLD-RTO: 16.5 % (ref 12.1–15.2)
PLATELET # BLD: 255 K/UL (ref 140–450)
PLATELET ESTIMATE: ABNORMAL
PMV BLD AUTO: ABNORMAL FL (ref 6–12)
RBC # BLD: 5.69 M/UL (ref 4.5–5.9)
RBC # BLD: ABNORMAL 10*6/UL
SEG NEUTROPHILS: 77 % (ref 39–75)
SEGMENTED NEUTROPHILS ABSOLUTE COUNT: 12.1 K/UL (ref 2.1–6.5)
WBC # BLD: 15.9 K/UL (ref 3.5–11)
WBC # BLD: ABNORMAL 10*3/UL

## 2020-01-08 PROCEDURE — 6370000000 HC RX 637 (ALT 250 FOR IP): Performed by: INTERNAL MEDICINE

## 2020-01-08 PROCEDURE — 85025 COMPLETE CBC W/AUTO DIFF WBC: CPT

## 2020-01-08 PROCEDURE — 94640 AIRWAY INHALATION TREATMENT: CPT

## 2020-01-08 PROCEDURE — 94761 N-INVAS EAR/PLS OXIMETRY MLT: CPT

## 2020-01-08 PROCEDURE — 36415 COLL VENOUS BLD VENIPUNCTURE: CPT

## 2020-01-08 PROCEDURE — 6360000002 HC RX W HCPCS: Performed by: INTERNAL MEDICINE

## 2020-01-08 RX ORDER — METOPROLOL TARTRATE 50 MG/1
75 TABLET, FILM COATED ORAL 2 TIMES DAILY
Qty: 60 TABLET | Refills: 1 | Status: SHIPPED | OUTPATIENT
Start: 2020-01-08 | End: 2020-04-20

## 2020-01-08 RX ORDER — FENOFIBRATE 160 MG/1
160 TABLET ORAL DAILY
Qty: 90 TABLET | Refills: 0 | Status: SHIPPED | OUTPATIENT
Start: 2020-01-08 | End: 2020-04-30 | Stop reason: SDUPTHER

## 2020-01-08 RX ORDER — PREDNISONE 20 MG/1
40 TABLET ORAL DAILY
Qty: 10 TABLET | Refills: 0 | Status: SHIPPED | OUTPATIENT
Start: 2020-01-09 | End: 2020-01-14

## 2020-01-08 RX ORDER — TRIAMTERENE AND HYDROCHLOROTHIAZIDE 37.5; 25 MG/1; MG/1
1 TABLET ORAL DAILY
Qty: 90 TABLET | Refills: 0 | Status: SHIPPED | OUTPATIENT
Start: 2020-01-08 | End: 2020-04-30 | Stop reason: SDUPTHER

## 2020-01-08 RX ORDER — DOXYCYCLINE HYCLATE 100 MG/1
100 CAPSULE ORAL 2 TIMES DAILY
Qty: 14 CAPSULE | Refills: 0 | Status: SHIPPED | OUTPATIENT
Start: 2020-01-08 | End: 2020-01-15

## 2020-01-08 RX ORDER — ALBUTEROL SULFATE 90 UG/1
2 AEROSOL, METERED RESPIRATORY (INHALATION) EVERY 6 HOURS PRN
Qty: 1 INHALER | Refills: 3 | Status: SHIPPED | OUTPATIENT
Start: 2020-01-08 | End: 2021-03-15 | Stop reason: SDUPTHER

## 2020-01-08 RX ORDER — LISINOPRIL 5 MG/1
5 TABLET ORAL DAILY
Qty: 30 TABLET | Refills: 3 | Status: SHIPPED | OUTPATIENT
Start: 2020-01-08 | End: 2021-01-27 | Stop reason: SDUPTHER

## 2020-01-08 RX ORDER — GUAIFENESIN 600 MG/1
600 TABLET, EXTENDED RELEASE ORAL 2 TIMES DAILY
Qty: 20 TABLET | Refills: 0 | Status: SHIPPED | OUTPATIENT
Start: 2020-01-08 | End: 2020-03-24

## 2020-01-08 RX ADMIN — FENOFIBRATE 160 MG: 160 TABLET ORAL at 08:50

## 2020-01-08 RX ADMIN — METOPROLOL TARTRATE 75 MG: 50 TABLET ORAL at 08:50

## 2020-01-08 RX ADMIN — ALBUTEROL SULFATE 2.5 MG: 2.5 SOLUTION RESPIRATORY (INHALATION) at 01:22

## 2020-01-08 RX ADMIN — TRIAMTERENE AND HYDROCHLOROTHIAZIDE 1 TABLET: 37.5; 25 TABLET ORAL at 08:50

## 2020-01-08 RX ADMIN — METHYLPREDNISOLONE SODIUM SUCCINATE 40 MG: 40 INJECTION, POWDER, FOR SOLUTION INTRAMUSCULAR; INTRAVENOUS at 04:58

## 2020-01-08 RX ADMIN — GUAIFENESIN 600 MG: 600 TABLET, EXTENDED RELEASE ORAL at 08:50

## 2020-01-08 RX ADMIN — ALBUTEROL SULFATE 2.5 MG: 2.5 SOLUTION RESPIRATORY (INHALATION) at 06:08

## 2020-01-08 RX ADMIN — IPRATROPIUM BROMIDE AND ALBUTEROL SULFATE 1 AMPULE: .5; 3 SOLUTION RESPIRATORY (INHALATION) at 09:10

## 2020-01-08 RX ADMIN — LISINOPRIL 5 MG: 5 TABLET ORAL at 08:50

## 2020-01-08 ASSESSMENT — PAIN SCALES - GENERAL
PAINLEVEL_OUTOF10: 0
PAINLEVEL_OUTOF10: 0

## 2020-01-08 NOTE — PROGRESS NOTES
Pt wishes to leave now and have  follow up with him at home regarding new wheel chair.  in agreement with this-still waiting to hear back from [employer].

## 2020-01-08 NOTE — PROGRESS NOTES
Discharge instructions given to pt with special attention to medications and follow up appointments. Verbalizes understanding.

## 2020-01-08 NOTE — DISCHARGE SUMMARY
Hospitalist Discharge Summary    Patient:  Beryl Madison  YOB: 1965    MRN: [id number]   Acct: [de-identified]    Primary Care Physician: Simone Almanza MD    Admit date:  1/6/2020    Discharge date:  1/8/2020       Discharge Diagnoses:     1. Acute exacerbation of COPD  2. Acute bronchitis  3. Hemoptysis  4. Pulmonary nodule in the lingula up to 1 cm per CT chest  5. Right renal cyst  6. Status post left BKA  7. Hypertension  8. Hyperlipidemia  9. Tobacco abuse  10. 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 Status post left lung lower lobectomy secondary to MRSA infection in 2010  12. Leukocytosis presumed to be secondary to steroids      Discharge Medications:       Therese Mckinley   Home Medication Instructions [id number]    Printed on:01/08/20 0848   Medication Information                      albuterol sulfate HFA (VENTOLIN HFA) 108 (90 Base) MCG/ACT inhaler  Inhale 2 puffs into the lungs every 6 hours as needed for Wheezing             doxycycline hyclate (VIBRAMYCIN) 100 MG capsule  Take 1 capsule by mouth 2 times daily for 7 days             fenofibrate 160 MG tablet  Take 1 tablet by mouth daily             guaiFENesin (MUCINEX) 600 MG extended release tablet  Take 1 tablet by mouth 2 times daily             lisinopril (PRINIVIL;ZESTRIL) 5 MG tablet  Take 1 tablet by mouth daily             metoprolol tartrate (LOPRESSOR) 50 MG tablet  Take 1.5 tablets by mouth 2 times daily             predniSONE (DELTASONE) 20 MG tablet  Take 2 tablets by mouth daily for 5 days             triamterene-hydrochlorothiazide (MAXZIDE-25) 37.5-25 MG per tablet  Take 1 tablet by mouth daily                 Diet:  DIET GENERAL;     Activity:  Activity as tolerated (Patient may move about with assist as indicated or with supervision.)    Follow-up:  in 1 weeks with Simone Almanza MD, follow-up with pulmonologist Dr. Bobbi Andre for it is broken and he is not able to use it anymore. We ordered a wheelchair for using home to complete his ADLs. Patient is willing to use the wheelchair. Due to his body weight of 378 pounds he will require a bariatric wheelchair. Ulices Cabello is also strongly advised to quit smoking. He is to follow-up with me in about 1 week.       Disposition: home    Condition: Stable    Time Spent: 34 minutes      Electronically signed by Africa Zarate MD on 1/8/2020 at 8:48 AM  Discharging Hospitalist

## 2020-01-08 NOTE — PROGRESS NOTES
Nutrition Assessment    Type and Reason for Visit: Initial, Positive Nutrition Screen, Consult(wound, weight management)    Nutrition Recommendations:   1. Continue current diet. 2. Encourage protein foods to aid healing needs. 3. Encouraged 3 meals per day at home. 4. Encouraged increased PA. Nutrition Assessment: Overweight/obesity r/t excess energy intakes aeb eats 1 meal/day and \"I eat. \" BMI 46.7. Pt states he has gained 20# x 3 months, 100# x 1 year after he lost his leg. States he has been depressed, \"so I eat. \" Noted R renal mass, pulmonary nodule, hemoptysis, and essential HTN. Pt encouraged to eat 3 meals per day, avoid high fat meats/fried foods. I discouraged sausage, pepperoni, and brown on his pizza, recommended vegetables and choose 1 meat and add a side salad. Pt does not exercise, I encouraged use of light weights/upper body exercise, and movements with leg. I suggested using Youtube for exercises too. Pt showed wounds on legs. Pt encouraged to eat protein foods, states he eats [place] lot of peanut butter. \" He is on steroid therapy, glucose 198, renal indices elevated. -220#. Discussed the importance to limit sodium to < 2,000 mg per day due to leg swelling. Pt being discharged today. Malnutrition Assessment:  · Malnutrition Status: No malnutrition  · Context: Acute illness or injury  · Findings of the 6 clinical characteristics of malnutrition (Minimum of 2 out of 6 clinical characteristics is required to make the diagnosis of moderate or severe Protein Calorie Malnutrition based on AND/ASPEN Guidelines):  1. Energy Intake-Greater than 75% of estimated energy requirement,      2. Weight Loss-No significant weight loss,    3. Fat Loss-No significant subcutaneous fat loss,    4. Muscle Loss-No significant muscle mass loss,    5. Fluid Accumulation-Moderate to severe fluid accumulation(+ 3 RLE ), Extremities  6.   Strength-Not measured    Nutrition Risk Level:

## 2020-01-08 NOTE — DISCHARGE INSTR - DIET
 Good nutrition is important when healing from an illness, injury, or surgery. Follow any nutrition recommendations given to you during your hospital stay.  If you were given an oral nutrition supplement while in the hospital, continue to take this supplement at home. You can take it with meals, in-between meals, and/or before bedtime. These supplements can be purchased at most local grocery stores, pharmacies, and chain super-stores.  If you have any questions about your diet or nutrition, call the hospital and ask for the dietitian. 1. Eat 3 meals per day, eating slowly, away from TV or computer. 2. Increase non starchy vegetables such as broccoli, carrots, green beans, etc. in your diet. 3. Avoid fried choices such as french fries, choose baked, broiled, or grilled options. 4. Limit pizza to 1 high fat meat choice, add a salad on the side. 5. Include 30 minutes of physical activity a minimum of 5 days per week. 6. Limit sodium to < 2,000 mg per day.

## 2020-01-08 NOTE — PROGRESS NOTES
Puts on call light to ask for coffee. Seems upset, states that he was sleeping and was awoken feeling as if he couldn't breathe, sweating and \"panicky\" feeling. Reports this happening often. Also states that often times when he lays flat he feels as if he cannot often breathe and has the same \"painicked\" feeling. Denies having a cardiologist or pulmonologist at this time. Declines to sit in recliner with feet propped up, declines SCD's. Vitals WNL. Reassurance given.

## 2020-01-09 ENCOUNTER — TELEPHONE (OUTPATIENT)
Dept: FAMILY MEDICINE CLINIC | Age: 55
End: 2020-01-09

## 2020-01-16 ENCOUNTER — OFFICE VISIT (OUTPATIENT)
Dept: FAMILY MEDICINE CLINIC | Age: 55
End: 2020-01-16
Payer: MEDICARE

## 2020-01-16 VITALS — DIASTOLIC BLOOD PRESSURE: 76 MMHG | HEART RATE: 79 BPM | SYSTOLIC BLOOD PRESSURE: 136 MMHG | OXYGEN SATURATION: 94 %

## 2020-01-16 PROCEDURE — 1111F DSCHRG MED/CURRENT MED MERGE: CPT | Performed by: INTERNAL MEDICINE

## 2020-01-16 PROCEDURE — G0009 ADMIN PNEUMOCOCCAL VACCINE: HCPCS | Performed by: INTERNAL MEDICINE

## 2020-01-16 PROCEDURE — 99495 TRANSJ CARE MGMT MOD F2F 14D: CPT | Performed by: INTERNAL MEDICINE

## 2020-01-16 PROCEDURE — 90732 PPSV23 VACC 2 YRS+ SUBQ/IM: CPT | Performed by: INTERNAL MEDICINE

## 2020-01-16 RX ORDER — BUPRENORPHINE HYDROCHLORIDE AND NALOXONE HYDROCHLORIDE DIHYDRATE 8; 2 MG/1; MG/1
TABLET SUBLINGUAL
COMMUNITY
Start: 2020-01-10

## 2020-01-16 ASSESSMENT — PATIENT HEALTH QUESTIONNAIRE - PHQ9
SUM OF ALL RESPONSES TO PHQ9 QUESTIONS 1 & 2: 0
2. FEELING DOWN, DEPRESSED OR HOPELESS: 0
SUM OF ALL RESPONSES TO PHQ QUESTIONS 1-9: 0
1. LITTLE INTEREST OR PLEASURE IN DOING THINGS: 0
SUM OF ALL RESPONSES TO PHQ QUESTIONS 1-9: 0

## 2020-01-16 NOTE — PROGRESS NOTES
Post-Discharge Transitional Care Management Services or Hospital Follow Up      Andrea Lemons   YOB: 1965    Date of Office Visit:  1/16/2020  Date of Hospital Admission: 1/6/20  Date of Hospital Discharge: 1/8/20  Risk of hospital readmission (high >=14%.  Medium >=10%) :Readmission Risk Score: 11      Care management risk score Rising risk (score 2-5) and Complex Care (Scores >=6): 5     Non face to face  following discharge, date last encounter closed (first attempt may have been earlier): 1/9/2020  3:44 PM    Call initiated 2 business days of discharge: Yes    Patient Active Problem List   Diagnosis    Chest pain    Neck pain    Headache    Cellulitis and abscess of forearm    Cellulitis, leg    Cellulitis of lower leg    Hemoptysis    Chronic obstructive pulmonary disease with acute exacerbation ([address]    [facility] exacerbation [facility]    HCV (hepatitis C virus)    HTN (hypertension)    Hx MRSA infection       No Known Allergies    Medications listed as ordered at the time of discharge from hospital   Kevin Dozier Branch A   Home Medication Instructions LEONOR:    Printed on:01/16/20 0806   Medication Information                      albuterol sulfate HFA (VENTOLIN HFA) 108 (90 Base) MCG/ACT inhaler  Inhale 2 puffs into the lungs every 6 hours as needed for Wheezing             buprenorphine-naloxone (SUBOXONE) 8-2 MG SUBL SL tablet  DISSOLVE 1 (ONE) UNDER THE TONGUE EVERY 12 HOURS             fenofibrate 160 MG tablet  Take 1 tablet by mouth daily             guaiFENesin (MUCINEX) 600 MG extended release tablet  Take 1 tablet by mouth 2 times daily             lisinopril (PRINIVIL;ZESTRIL) 5 MG tablet  Take 1 tablet by mouth daily             metoprolol tartrate (LOPRESSOR) 50 MG tablet  Take 1.5 tablets by mouth 2 times daily             triamterene-hydrochlorothiazide (MAXZIDE-25) 37.5-25 MG per tablet  Take 1 tablet by mouth daily                   Medications marked \"taking\" at this time  Outpatient Medications Marked as Taking for the 1/16/20 encounter (Office Visit) with Paige Ybarra MD   Medication Sig Dispense Refill    fenofibrate 160 MG tablet Take 1 tablet by mouth daily 90 tablet 0    lisinopril (PRINIVIL;ZESTRIL) 5 MG tablet Take 1 tablet by mouth daily 30 tablet 3    metoprolol tartrate (LOPRESSOR) 50 MG tablet Take 1.5 tablets by mouth 2 times daily 60 tablet 1    guaiFENesin (MUCINEX) 600 MG extended release tablet Take 1 tablet by mouth 2 times daily 20 tablet 0    triamterene-hydrochlorothiazide (MAXZIDE-25) 37.5-25 MG per tablet Take 1 tablet by mouth daily 90 tablet 0    albuterol sulfate HFA (VENTOLIN HFA) 108 (90 Base) MCG/ACT inhaler Inhale 2 puffs into the lungs every 6 hours as needed for Wheezing 1 Inhaler 3        Medications patient taking as of now reconciled against medications ordered at time of hospital discharge: Yes    Chief Complaint   Patient presents with    Follow-Up from Hospital     Pt was in hospital for COPD exacerbation. from 01/06-01/08, states that he is doing a little better since getting out. History of Present illness - Follow up of Hospital diagnosis(es):    Mr. Jenne Brunner presents to office to follow up for recent hospitalization at [facility] 1/6/20-1/8/20  for COPD exacerbation, bronchitis, hemoptysis. He was treated with IV antibiotics, steroids, frequent nebulizers,. He had a CT scan of chest for evaluation of hemoptysis. It revealed pulmonary nodule in the lingula up to 1 cm in diameter, incidental finding of 8 cm mass at the upper pole right kidney. CT also showed bilateral bronchitis but no organized pneumonia. The patient had a ultrasound for evaluation of renal mass and it came back showing a simple cyst in the right kidney upper pole measuring 8.4 cm. The patient clinically improved and was discharged home with antibiotics and prednisone.   He was referred to pulmonologist for evaluation of hemoptysis and the regular rhythm, normal S1 and S2, no murmurs, rubs, clicks, or gallops  Abdomen: soft,obese ,  non-tender, non-distended, normal bowel sounds  Extremities: Left BKA, RLE with one plus edema. 2 open wounds present on the right lower extremity with clean bases, no active discharge. The leg also has chronic venous static discoloration  Neurologic:  no cranial nerve deficit, gait, coordination and speech normal    Assessment/Plan:    1. Chronic obstructive pulmonary disease with acute exacerbation (HCC)  COPD exacerbation resolved, he is at baseline, using albuterol inhaler. Advised to quit smoking. 2. Hemoptysis  Follow-up with pulmonologist Dr. Shruti Mcginnis in [facility] on 2/17/2020    3. Pulmonary nodule  Pulmonology follow-up, repeat CT scan of chest is ordered and pending for 4/6/20    4. Essential hypertension  Blood pressure is stable, continue on diuretics and lisinopril    5. Morbid obesity with BMI of 40.0-44.9, adult [facility])  Patient is unable to exercise due to left BKA. Not interested in dietitian referral.  For now advised to follow low calorie diet, 2000-calorie/day    6.  Need for Streptococcus pneumoniae vaccination  Received Pneumovax 23    - Pneumococcal polysaccharide vaccine 23-valent greater than or equal to 3yo subcutaneous/IM        Medical Decision Making: moderate complexity

## 2020-01-16 NOTE — PROGRESS NOTES
After obtaining consent, and per orders of Dr. Hiro Mccarthy, injection of Pneumovax 23 given in Right deltoid by Arabella Trinh. Patient instructed to remain in clinic for 20 minutes afterwards, and to report any adverse reaction to me immediately.

## 2020-01-16 NOTE — PATIENT INSTRUCTIONS
You may be receiving a survey from [employer] regarding your visit today. You may get this in the mail, through your MyChart or in your email. Please complete the survey to enable us to provide the highest quality of care to you and your family. If you cannot score us as very good ( 5 Stars) on any question, please feel free to call the office to discuss how we could have made your experience exceptional.     Thank You! Dr. Beaulieu Mercury   Pneumococcal Polysaccharide Vaccine What You Need to Know   Many Vaccine Information Statements are available in [place] and other languages. See www.immunize.org/vis   Hojas de información sobre vacunas están disponibles en español y en muchos otros idiomas. Visite www.immunize.org/vis   1 Why get vaccinated? Vaccination can protect older adults (and some children and younger adults) from pneumococcal disease. Pneumococcal disease is caused by bacteria that can spread from person to person through close contact. It can cause ear infections, and it can also lead to more serious infections of the:    Lungs (pneumonia),    Blood (bacteremia), and    Covering of the brain and spinal cord (meningitis). Meningitis can cause deafness and brain damage, and it can be fatal.     Anyone can get pneumococcal disease, but children under 3years of age, people with certain medical conditions, adults over 72years of age, and cigarette smokers are at the highest risk. About 18,000 older adults die each year from pneumococcal disease in the [place]. Treatment of pneumococcal infections with penicillin and other drugs used to be more effective. But some strains of the disease have become resistant to these drugs. This makes prevention of the disease, through vaccination, even more important.    2 Pneumococcal polysaccharide vaccine (PPSV23)   Pneumococcal polysaccharide vaccine (PPSV23) protects against Problems that could happen after any vaccine:    People sometimes faint after a medical procedure, including vaccination. Sitting or lying down for about 15 minutes can help prevent fainting, and injuries caused by a fall. Tell your doctor if you feel dizzy, or have vision changes or ringing in the ears. Some people get severe pain in the shoulder and have difficulty moving the arm where a shot was given. This happens very rarely. Any medication can cause a severe allergic reaction. Such reactions from a vaccine are very rare, estimated at about 1 in a million doses, and would happen within a few minutes to a few hours after the vaccination. As with any medicine, there is a very remote chance of a vaccine causing a serious injury or death. The safety of vaccines is always being monitored. For more information, visit: www.cdc.gov/vaccinesafety/   5 What if there is a serious reaction? What should I look for? Look for anything that concerns you, such as signs of a severe allergic reaction, very high fever, or unusual behavior. Signs of a severe allergic reaction can include hives, swelling of the face and throat, difficulty breathing, a fast heartbeat, dizziness, and weakness. These would usually start a few minutes to a few hours after the vaccination. What should I do? If you think it is a severe allergic reaction or other emergency that cant wait, call 9-1-1 or get to the nearest hospital. Otherwise, call your doctor. Afterward, the reaction should be reported to the Vaccine Adverse Event Reporting System (VAERS). Your doctor might file this report, or you can do it yourself through the VAERS web site at www.vaers. hhs.gov, or by calling 5-615.559.2259. VAERS does not give medical advice. 6 How can I learn more? Ask your doctor. He or she can give you the vaccine package insert or suggest other sources of information. Call your local or state health department.     Contact the Centers for Disease Control and Prevention (CDC): - Call 6-425.357.9763 (1-800-CDC-INFO) or - Visit CDCs website at www.cdc.gov/vaccines     Vaccine Information Statement   PPSV Vaccine

## 2020-01-27 ENCOUNTER — OFFICE VISIT (OUTPATIENT)
Dept: PULMONOLOGY | Age: 55
End: 2020-01-27
Payer: MEDICARE

## 2020-01-27 VITALS
HEART RATE: 73 BPM | RESPIRATION RATE: 16 BRPM | HEIGHT: 75 IN | SYSTOLIC BLOOD PRESSURE: 142 MMHG | TEMPERATURE: 99.3 F | WEIGHT: 315 LBS | DIASTOLIC BLOOD PRESSURE: 103 MMHG | OXYGEN SATURATION: 92 % | BODY MASS INDEX: 39.17 KG/M2

## 2020-01-27 PROCEDURE — G8482 FLU IMMUNIZE ORDER/ADMIN: HCPCS | Performed by: INTERNAL MEDICINE

## 2020-01-27 PROCEDURE — G8427 DOCREV CUR MEDS BY ELIG CLIN: HCPCS | Performed by: INTERNAL MEDICINE

## 2020-01-27 PROCEDURE — 99213 OFFICE O/P EST LOW 20 MIN: CPT

## 2020-01-27 PROCEDURE — 1111F DSCHRG MED/CURRENT MED MERGE: CPT | Performed by: INTERNAL MEDICINE

## 2020-01-27 PROCEDURE — 3017F COLORECTAL CA SCREEN DOC REV: CPT | Performed by: INTERNAL MEDICINE

## 2020-01-27 PROCEDURE — G8926 SPIRO NO PERF OR DOC: HCPCS | Performed by: INTERNAL MEDICINE

## 2020-01-27 PROCEDURE — 99204 OFFICE O/P NEW MOD 45 MIN: CPT | Performed by: INTERNAL MEDICINE

## 2020-01-27 PROCEDURE — 3023F SPIROM DOC REV: CPT | Performed by: INTERNAL MEDICINE

## 2020-01-27 PROCEDURE — G8417 CALC BMI ABV UP PARAM F/U: HCPCS | Performed by: INTERNAL MEDICINE

## 2020-01-27 PROCEDURE — 4004F PT TOBACCO SCREEN RCVD TLK: CPT | Performed by: INTERNAL MEDICINE

## 2020-01-27 RX ORDER — FLUTICASONE FUROATE AND VILANTEROL 200; 25 UG/1; UG/1
1 POWDER RESPIRATORY (INHALATION) DAILY
Qty: 1 EACH | Refills: 2 | Status: SHIPPED | OUTPATIENT
Start: 2020-01-27 | End: 2021-04-23 | Stop reason: SDUPTHER

## 2020-01-27 RX ORDER — ALBUTEROL SULFATE 2.5 MG/3ML
2.5 SOLUTION RESPIRATORY (INHALATION) EVERY 4 HOURS PRN
Qty: 120 EACH | Refills: 11 | Status: SHIPPED | OUTPATIENT
Start: 2020-01-27 | End: 2021-04-23 | Stop reason: SDUPTHER

## 2020-01-27 NOTE — PATIENT INSTRUCTIONS
SURVEY:    You may be receiving a survey from [employer] regarding your visit today. Please complete the survey to enable us to provide the highest quality of care to you and your family. If you cannot score us a very good on any question, please call the office to discuss how we could have made your experience a very good one. Thank you. Patient Education        Learning About Benefits From Quitting Smoking  How does quitting smoking make you healthier? If you're thinking about quitting smoking, you may have a few reasons to be smoke-free. Your health may be one of them. · When you quit smoking, you lower your risks for cancer, lung disease, heart attack, stroke, blood vessel disease, and blindness from macular degeneration. · When you're smoke-free, you get sick less often, and you heal faster. You are less likely to get colds, flu, bronchitis, and pneumonia. · As a nonsmoker, you may find that your mood is better and you are less stressed. When and how will you feel healthier? Quitting has real health benefits that start from day 1 of being smoke-free. And the longer you stay smoke-free, the healthier you get and the better you feel. The first hours  · After just 20 minutes, your blood pressure and heart rate go down. That means there's less stress on your heart and blood vessels. · Within 12 hours, the level of carbon monoxide in your blood drops back to normal. That makes room for more oxygen. With more oxygen in your body, you may notice that you have more energy than when you smoked. After 2 weeks  · Your lungs start to work better. · Your risk of heart attack starts to drop. After 1 month  · When your lungs are clear, you cough less and breathe deeper, so it's easier to be active. · Your sense of taste and smell return. That means you can enjoy food more than you have since you started smoking.   Over the years  · After 1 year, your risk of heart disease is half what it would be if you kept smoking. · After 5 years, your risk of stroke starts to shrink. Within a few years after that, it's about the same as if you'd never smoked. · After 10 years, your risk of dying from lung cancer is cut by about half. And your risk for many other types of cancer is lower too. How would quitting help others in your life? When you quit smoking, you improve the health of everyone who now breathes in your smoke. · Their heart, lung, and cancer risks drop, much like yours. · They are sick less. For babies and small children, living smoke-free means they're less likely to have ear infections, pneumonia, and bronchitis. · If you're a woman who is or will be pregnant someday, quitting smoking means a healthier . · Children who are close to you are less likely to become adult smokers. Where can you learn more? Go to [email or web address] and sign in to your [employer] account. Enter [phone] in the [employer]opia box to learn more about \"Learning About Benefits From Quitting Smoking. \"     If you do not have an account, please click on the \"Sign Up Now\" link. Current as of: 2019  Content Version: 12.3  © 6538-0415 Healthwise, Incorporated. Care instructions adapted under license by [facility] ([facility]). If you have questions about a medical condition or this instruction, always ask your healthcare professional. [place] [address] any warranty or liability for your use of this information.

## 2020-01-28 NOTE — PROGRESS NOTES
never used smokeless tobacco.  ETOH:   reports no history of alcohol use. DRUGS: reports previous drug use. AVOCATION/OCCUPATIONAL EXPOSURE:  None    IMMUNIZATION HISTORY:  Immunization History   Administered Date(s) Administered    Influenza Vaccine, unspecified formulation 10/25/2016    Influenza Whole 11/01/2015    Influenza, Quadv, IM, PF (6 mo and older Fluzone, Flulaval, Fluarix, and 3 yrs and older Afluria) 01/07/2020    Pneumococcal Polysaccharide ([id number]) 01/16/2020        ALLERGIES:  No Known Allergies     MEDICATIONS:  Outpatient Medications Prior to Visit   Medication Sig Dispense Refill    buprenorphine-naloxone (SUBOXONE) 8-2 MG SUBL SL tablet DISSOLVE 1 (ONE) UNDER THE TONGUE EVERY 12 HOURS      fenofibrate 160 MG tablet Take 1 tablet by mouth daily 90 tablet 0    lisinopril (PRINIVIL;ZESTRIL) 5 MG tablet Take 1 tablet by mouth daily 30 tablet 3    metoprolol tartrate (LOPRESSOR) 50 MG tablet Take 1.5 tablets by mouth 2 times daily 60 tablet 1    triamterene-hydrochlorothiazide (MAXZIDE-25) 37.5-25 MG per tablet Take 1 tablet by mouth daily 90 tablet 0    albuterol sulfate HFA (VENTOLIN HFA) 108 (90 Base) MCG/ACT inhaler Inhale 2 puffs into the lungs every 6 hours as needed for Wheezing 1 Inhaler 3    guaiFENesin (MUCINEX) 600 MG extended release tablet Take 1 tablet by mouth 2 times daily (Patient not taking: Reported on 1/27/2020) 20 tablet 0     No facility-administered medications prior to visit.          REVIEW OF SYSTEMS:   General: positive for- sleep disturbance, negative for - chills, fever, night sweats, weight gain or weight loss   Psychological: negative for - anxiety or depression   Ophthalmic: negative for - blurry vision or decreased vision   ENT: negative   Allergy and Immunology: negative   Hematological and Lymphatic: negative for - bleeding problems, blood clots, bruising or swollen lymph nodes   Endocrine: negative for - polydipsia/polyuria or temperature intolerance   Respiratory: positive for - cough and shortness of breath   Cardiovascular: negative for - chest pain, palpitations or paroxysmal nocturnal dyspnea   Gastrointestinal: negative for - abdominal pain, change in bowel habits or nausea/vomiting   Genito-Urinary: no dysuria, trouble voiding, or hematuria   Musculoskeletal: negative for - joint pain or joint swelling   Neurological: negative for - headaches, impaired coordination/balance, speech problems or weakness   Dermatological: negative for - rash or skin lesion changes      PHYSICAL EXAMINATION      VITAL SIGNS:   BP (!) 142/103   Pulse 73   Temp 99.3 °F (37.4 °C)   Resp 16   Ht 6' 3\" (1.905 m)   Wt (!) 370 lb (167.8 kg)   SpO2 92%   BMI 46.25 kg/m²   Wt Readings from Last 3 Encounters:   01/27/20 (!) 370 lb (167.8 kg)   01/08/20 (!) 373 lb 3.2 oz (169.3 kg)   06/17/19 (!) 351 lb 1.6 oz (159.3 kg)     SYSTEMIC EXAMINATION:   General appearance - well appearing, overweight, in no acute distress   Mental status - alert, oriented to person, place, and time   Eyes - pupils equal and reactive, extraocular eye movements intact, sclera anicteric   Ears - bilateral TM's and external ear canals normal, not examined   Nose - normal and patent, no erythema, discharge or polyps   Mouth - mucous membranes moist, pharynx normal without lesions   Mallampati Airway Score - IV (only hard palate visible)   Neck - supple, no significant adenopathy, carotids upstroke normal bilaterally, no bruits   Lymphatics - no palpable lymphadenopathy, no hepatosplenomegaly   Chest -bilateral decreased air entry with prolonged expiratory phase.    Heart - normal rate, regular rhythm, normal S1, S2, no murmurs, rubs, clicks or gallops   Abdomen - soft, nontender, nondistended, no masses or organomegaly   Neurological - motor and sensory grossly normal bilaterally   Musculoskeletal - no joint tenderness, deformity or swelling   Extremities - peripheral [id number],   No results found for: RPR, HIV    RADIOLOGY:  CT chest 1/6/2020  Impression       1. Prior mediastinotomy with evidence of scarring in the left lung base related    to prior wedge resection.       2. Pulmonary nodule in the lingula measures up to 1 cm in diameter. Consider    PET/CT characterization of this nodule. If this is not performed would    recommend CT follow up in 3 months.       3. There is bilateral bronchitis but no organized pneumonia.       4. Indeterminate 8 cm mass at the upper pole right kidney. This could be a    proteinaceous cyst less likely solid nodule. Consider ultrasound   characterization. PULMONARY FUNCTION TEST:  No results found for: FEV1, FVC, RKC3SAC, TLC, DLCO    ECHOCARDIOGRAM: 5/22/2015  Summary  Left ventricle is normal in size. Moderate left ventricular hypertrophy. Global left ventricular systolic function is low normal Estimated ejection  fraction is 50 % . Left atrium is at upper limits of normal.  Right atrium is mildly dilated . Normal right ventricular size and function. Aortic valve leaflets are mildly thickened. No aortic stenosis. Normal mitral valve structure. Mild mitral regurgitation.     In summary, has borderline normal LV function with EF 50%. Could not see any  vegetations, although technically difficult echo. Mild TR and MR.    CARDIAC CATHETERIZATION:  No results found for this or any previous visit. ASSESSMENT AND PLAN     ASSESSMENT:    ICD-10-CM    1. Chronic obstructive pulmonary disease with acute exacerbation (HCC) J44.1 DME Order for Nebulizer as OP     Fluticasone furoate-vilanterol (BREO ELLIPTA) 200-25 MCG/INH AEPB inhaler     albuterol (PROVENTIL) (2.5 MG/3ML) 0.083% nebulizer solution     Full PFT Study With Bronchodilator   2. Morbid obesity with BMI of 45.0-49.9, adult ([facility] E66.01     Z68.42    3.  Sleep apnea, unspecified type G47.30 Baseline Diagnostic Sleep Study     Sleep Study with PAP Titration PLAN/RECOMMENDATIONS:     Pulmonary function tests ordered   Chest x-ray/CT scan of the chest reviewed, plan for repeat CT scan in 3 months   Prescribed Breo Ellipta, nebulizer machine with albuterol aerosols   Reviewed use of rescue versus controlling agents and potential side effects   Refills were provided    Barriers to medication compliance addressed.  Patient was recommended to have prednisone and an antibiotic available for use during an exacerbation   Patient received counseling on the following healthy behaviors: nutrition, exercise and medication adherence   Maintain an active lifestyle   Strongly admonished to quit smoking  Lung Cancer Screening: After reviewing the patient's smoking history, age and other clinical criteria, the LOW DOSE CT is : NOT INDICATED; Age is < 54 or > 77 years and NOT INDICATED; Recent CT within 11 months   Recommend influenza vaccination in the fall annually   Recommendations were given regarding pneumococcal vaccination     Patient reports unrefreshing and nonrestorative sleep   Polysomnogram ordered   We'll see the patient back after the sleep study   Weight loss was recommended and discussed   Recommended good sleep hygiene and instructions provided   Patient instructed not to drive or operate heavy machinery, if sleepy    All the questions that the patient had were answered to her satisfaction  We'll see the patient back in 3 months  Thank you for having us involved in the care of your patient. Please call us if you have any questions or concerns. Lissa Becerra MD    Pulmonary and Critical Care Medicine            Please note that this chart was generated using voice recognition Dragon dictation software. Although every effort was made to ensure the accuracy of this automated transcription, some errors in transcription may have occurred.

## 2020-03-24 ENCOUNTER — HOSPITAL ENCOUNTER (EMERGENCY)
Age: 55
Discharge: HOME | End: 2020-03-24
Attending: INTERNAL MEDICINE
Payer: MEDICARE

## 2020-03-24 VITALS
RESPIRATION RATE: 18 BRPM | SYSTOLIC BLOOD PRESSURE: 150 MMHG | BODY MASS INDEX: 39.17 KG/M2 | WEIGHT: 315 LBS | HEART RATE: 99 BPM | OXYGEN SATURATION: 99 % | HEIGHT: 75 IN | TEMPERATURE: 98.3 F | DIASTOLIC BLOOD PRESSURE: 93 MMHG

## 2020-03-24 PROCEDURE — 99283 EMERGENCY DEPT VISIT LOW MDM: CPT

## 2020-03-24 RX ORDER — HYDROXYZINE PAMOATE 25 MG/1
25 CAPSULE ORAL NIGHTLY
Qty: 5 CAPSULE | Refills: 0 | Status: SHIPPED | OUTPATIENT
Start: 2020-03-24 | End: 2020-03-29

## 2020-03-25 ENCOUNTER — CARE COORDINATION (OUTPATIENT)
Dept: CARE COORDINATION | Age: 55
End: 2020-03-25

## 2020-03-25 NOTE — ED PROVIDER NOTES
[facility] ED  EMERGENCY DEPARTMENT ENCOUNTER      Pt Name: Capo Trinidad  MRN: [id number]  [place] 1965  Date of evaluation: 3/24/2020  Provider: Brielle Coyne MD    [address] [place] [address]       Chief Complaint   Patient presents with    Insomnia     Pt states \"I haven't been able to sleep for month and half\". Denies any pain. HISTORY OF PRESENT ILLNESS   (Location/Symptom, Timing/Onset, Context/Setting, Quality, Duration, Modifying Factors, Severity)  Note limiting factors. Capo Trinidad is a 49 y.o. male who has a history of COPD, hepatitis C, hypertension, left leg amputation, presents to the emergency department for evaluation and management of insomnia of 1 to 2 months duration. He states that he sleeps in his recliner and sleeps only a couple hours at a time. He reports being exhausted. He denies chest pain, shortness of breath. He denies feeling stressed or anxious. He states that he has trouble falling asleep and staying asleep. He denies snoring. He notes a possibility that using an albuterol inhaler later in the evening may be causing some of his problems. He reported that he has used over-the-counter sleep aids and they have not helped. She also stated that he was supposed to have a sleep study but missed the appointment. HPI    Nursing Notes were reviewed. REVIEW OF SYSTEMS    (2-9 systems for level 4, 10 or more for level 5)       REVIEW OF SYSTEMS    Constitutional: Positive for insomnia, negative for fatigue and fever. Respiratory: Negative for cough, chest tightness and shortness of breath. Cardiovascular: Negative for chest pain, palpitations and leg swelling. Gastrointestinal: Negative for abdominal distention, abdominal pain, diarrhea, nausea and vomiting. Neurological: Negative for dizziness, speech difficulty, weakness, distal tingling/numbness and headaches.      Except as noted above theremainder of the review of systems was reviewed and

## 2020-03-26 ENCOUNTER — CARE COORDINATION (OUTPATIENT)
Dept: CARE COORDINATION | Age: 55
End: 2020-03-26

## 2020-04-20 RX ORDER — METOPROLOL TARTRATE 50 MG/1
TABLET, FILM COATED ORAL
Qty: 60 TABLET | Refills: 1 | Status: SHIPPED | OUTPATIENT
Start: 2020-04-20 | End: 2020-04-30 | Stop reason: SDUPTHER

## 2020-04-23 ENCOUNTER — TELEMEDICINE (OUTPATIENT)
Dept: PULMONOLOGY | Age: 55
End: 2020-04-23
Payer: MEDICARE

## 2020-04-23 PROCEDURE — G8417 CALC BMI ABV UP PARAM F/U: HCPCS | Performed by: NURSE PRACTITIONER

## 2020-04-23 PROCEDURE — 99213 OFFICE O/P EST LOW 20 MIN: CPT | Performed by: NURSE PRACTITIONER

## 2020-04-23 PROCEDURE — G8427 DOCREV CUR MEDS BY ELIG CLIN: HCPCS | Performed by: NURSE PRACTITIONER

## 2020-04-23 PROCEDURE — 3017F COLORECTAL CA SCREEN DOC REV: CPT | Performed by: NURSE PRACTITIONER

## 2020-04-23 PROCEDURE — 3023F SPIROM DOC REV: CPT | Performed by: NURSE PRACTITIONER

## 2020-04-23 PROCEDURE — 4004F PT TOBACCO SCREEN RCVD TLK: CPT | Performed by: NURSE PRACTITIONER

## 2020-04-23 PROCEDURE — G8926 SPIRO NO PERF OR DOC: HCPCS | Performed by: NURSE PRACTITIONER

## 2020-04-23 ASSESSMENT — ENCOUNTER SYMPTOMS
EYES NEGATIVE: 1
ALLERGIC/IMMUNOLOGIC NEGATIVE: 1
GASTROINTESTINAL NEGATIVE: 1

## 2020-04-23 NOTE — PROGRESS NOTES
2020    TELEHEALTH EVALUATION -- Audio/Visual (During QEFSM-26 public health emergency)    Patient and physician are located in their individual locations. This is visit is completed via [employer] application / [email or web address] / Telephone     HPI:    Trina Valencia (:  1965) has requested an audio/video evaluation for the following concern(s): Shortness of breath, cough. Patient last seen in the office in  per Dr. Madi Boss. At that visit patient had pulmonary function tests and a sleep study ordered. Patient has not completed either of those. Patient was also have a follow-up CT chest that has not been completed as well. Patient endorses ongoing shortness of breath and coughing. Cough is productive of yellow phlegm. Shortness of breath is with activity. He is generally wheelchair-bound. He also reports lower extremity edema. He unfortunately continues to still smoke, smoking 2 to 3 cigarettes daily. Lengthy discussion with patient regarding smoking cessation. Patient was unfortunately recently in the emergency room on 3/24/2020 with a complaint of insomnia. CT chest completed 2020: Pulmonary nodule in the lingula measuring 1 cm in diameter. Bilateral bronchitis with no organized pneumonia. Recommendations for repeat CT scanning in 3 months versus PET scan. Patient was referred for repeat CT scan however has not completed to date. Echocardiogram completed 2015: Moderate left ventricular per trophy, LVEF 50%, no aortic stenosis, mild mitral regurgitation. Medications:  Albuterol nebulizer:  Albuterol HFA:      Review of Systems   Constitutional:        Wheelchair-bound, physical deconditioning and activity intolerance. HENT:        Cough productive of yellow sputum. Denies hemoptysis. Eyes: Negative. Respiratory:        Shortness of breath with exertion, asking about home oxygen. Cardiovascular:        Lower extremity edema. Gastrointestinal: Negative. today's visit.     Wt Readings from Last 3 Encounters:   03/24/20 (!) 370 lb (167.8 kg)   01/27/20 (!) 370 lb (167.8 kg)   01/08/20 (!) 373 lb 3.2 oz (169.3 kg)       Results for orders placed or performed during the hospital encounter of 01/06/20   Brain Natriuretic Peptide   Result Value Ref Range    Pro- <300 pg/mL    BNP Interpretation Pro-BNP Reference Range:    CBC Auto Differential   Result Value Ref Range    WBC 8.2 3.5 - 11.0 k/uL    RBC 6.08 (H) 4.5 - 5.9 m/uL    Hemoglobin 15.7 13.5 - 17.5 g/dL    Hematocrit 48.3 41 - 53 %    MCV 79.5 (L) 80 - 100 fL    MCH 25.9 (L) 26 - 34 pg    MCHC 32.5 31 - 37 g/dL    RDW 16.5 (H) 12.1 - 15.2 %    Platelets 621 659 - 197 k/uL    MPV NOT REPORTED 6.0 - 12.0 fL    NRBC Automated NOT REPORTED per 100 WBC    Differential Type YES     Seg Neutrophils 73 39 - 75 %    Lymphocytes 15 13 - 44 %    Monocytes 8 5 - 9 %    Eosinophils % 4 0 - 5 %    Basophils 0 0 - 2 %    Immature Granulocytes NOT REPORTED 0 %    Segs Absolute 6.00 2.1 - 6.5 k/uL    Absolute Lymph # 1.20 1.0 - 4.8 k/uL    Absolute Mono # 0.60 0.0 - 1.0 k/uL    Absolute Eos # 0.30 0.0 - 0.4 k/uL    Basophils Absolute 0.00 0.0 - 0.2 k/uL    Absolute Immature Granulocyte NOT REPORTED 0.00 - 0.30 k/uL    WBC Morphology NOT REPORTED     RBC Morphology NOT REPORTED     Platelet Estimate NOT REPORTED    Troponin   Result Value Ref Range    Troponin, High Sensitivity NOT REPORTED 0 - 22 ng/L    Troponin T <0.03 <0.03 ng/mL    Troponin Inter         Comprehensive Metabolic Panel   Result Value Ref Range    Glucose 122 (H) 70 - 99 mg/dL    BUN 14 6 - 20 mg/dL    CREATININE 0.70 0.70 - 1.20 mg/dL    Bun/Cre Ratio 20 9 - 20    Calcium 10.1 8.6 - 10.4 mg/dL    Sodium 136 135 - 144 mmol/L    Potassium 4.3 3.7 - 5.3 mmol/L    Chloride 98 98 - 107 mmol/L    CO2 27 20 - 31 mmol/L    Anion Gap 11 9 - 17 mmol/L    Alkaline Phosphatase 107 40 - 129 U/L    ALT 63 (H) 5 - 41 U/L    AST 48 (H) <40 U/L    Total Bilirubin 0.57 0.30 - 1.20 mg/dL    Total Protein 9.1 (H) 6.4 - 8.3 g/dL    Alb 4.1 3.5 - 5.2 g/dL    Albumin/Globulin Ratio NOT REPORTED 1.0 - 2.5    GFR Non-African American >60 >60 mL/min    GFR African American >60 >60 mL/min    GFR Comment          GFR Staging NOT REPORTED    Protime-INR   Result Value Ref Range    Protime 10.5 9.0 - 11.6 sec    INR 1.1    Basic Metabolic Panel w/ Reflex to MG   Result Value Ref Range    Glucose 198 (H) 70 - 99 mg/dL    BUN 21 (H) 6 - 20 mg/dL    CREATININE 0.69 (L) 0.70 - 1.20 mg/dL    Bun/Cre Ratio 30 (H) 9 - 20    Calcium 9.9 8.6 - 10.4 mg/dL    Sodium 137 135 - 144 mmol/L    Potassium 4.3 3.7 - 5.3 mmol/L    Chloride 100 98 - 107 mmol/L    CO2 25 20 - 31 mmol/L    Anion Gap 12 9 - 17 mmol/L    GFR Non-African American >60 >60 mL/min    GFR African American >60 >60 mL/min    GFR Comment          GFR Staging NOT REPORTED    CBC auto differential   Result Value Ref Range    WBC 8.9 3.5 - 11.0 k/uL    RBC 5.76 4.5 - 5.9 m/uL    Hemoglobin 14.9 13.5 - 17.5 g/dL    Hematocrit 46.7 41 - 53 %    MCV 81.0 80 - 100 fL    MCH 26.0 26 - 34 pg    MCHC 32.0 31 - 37 g/dL    RDW 16.0 (H) 12.1 - 15.2 %    Platelets 106 926 - 397 k/uL    MPV NOT REPORTED 6.0 - 12.0 fL    NRBC Automated NOT REPORTED per 100 WBC    Differential Type YES     Seg Neutrophils 83 (H) 39 - 75 %    Lymphocytes 13 13 - 44 %    Monocytes 4 (L) 5 - 9 %    Eosinophils % 0 0 - 5 %    Basophils 0 0 - 2 %    Immature Granulocytes NOT REPORTED 0 %    Segs Absolute 7.40 (H) 2.1 - 6.5 k/uL    Absolute Lymph # 1.10 1.0 - 4.8 k/uL    Absolute Mono # 0.30 0.0 - 1.0 k/uL    Absolute Eos # 0.00 0.0 - 0.4 k/uL    Basophils Absolute 0.00 0.0 - 0.2 k/uL    Absolute Immature Granulocyte NOT REPORTED 0.00 - 0.30 k/uL    WBC Morphology NOT REPORTED     RBC Morphology NOT REPORTED     Platelet Estimate NOT REPORTED    CBC Auto Differential   Result Value Ref Range    WBC 15.9 (H) 3.5 - 11.0 k/uL    RBC 5.69 4.5 - 5.9 m/uL    Hemoglobin 14.7 13.5 - 17.5

## 2020-04-24 ENCOUNTER — HOSPITAL ENCOUNTER (EMERGENCY)
Age: 55
Discharge: HOME | End: 2020-04-25
Attending: FAMILY MEDICINE
Payer: MEDICARE

## 2020-04-24 ENCOUNTER — APPOINTMENT (OUTPATIENT)
Dept: GENERAL RADIOLOGY | Age: 55
End: 2020-04-24
Payer: MEDICARE

## 2020-04-24 VITALS
RESPIRATION RATE: 24 BRPM | DIASTOLIC BLOOD PRESSURE: 98 MMHG | SYSTOLIC BLOOD PRESSURE: 175 MMHG | OXYGEN SATURATION: 92 % | HEART RATE: 94 BPM | TEMPERATURE: 98.6 F

## 2020-04-24 LAB
ABSOLUTE EOS #: 0.3 K/UL (ref 0–0.4)
ABSOLUTE IMMATURE GRANULOCYTE: ABNORMAL K/UL (ref 0–0.3)
ABSOLUTE LYMPH #: 1.4 K/UL (ref 1–4.8)
ABSOLUTE MONO #: 0.9 K/UL (ref 0–1)
ANION GAP SERPL CALCULATED.3IONS-SCNC: 11 MMOL/L (ref 9–17)
BASOPHILS # BLD: 1 % (ref 0–2)
BASOPHILS ABSOLUTE: 0.1 K/UL (ref 0–0.2)
BNP INTERPRETATION: NORMAL
BUN BLDV-MCNC: 16 MG/DL (ref 6–20)
BUN/CREAT BLD: 24 (ref 9–20)
CALCIUM SERPL-MCNC: 9.5 MG/DL (ref 8.6–10.4)
CHLORIDE BLD-SCNC: 99 MMOL/L (ref 98–107)
CO2: 29 MMOL/L (ref 20–31)
CREAT SERPL-MCNC: 0.66 MG/DL (ref 0.7–1.2)
DIFFERENTIAL TYPE: YES
EOSINOPHILS RELATIVE PERCENT: 3 % (ref 0–5)
GFR AFRICAN AMERICAN: >60 ML/MIN
GFR NON-AFRICAN AMERICAN: >60 ML/MIN
GFR SERPL CREATININE-BSD FRML MDRD: ABNORMAL ML/MIN/{1.73_M2}
GFR SERPL CREATININE-BSD FRML MDRD: ABNORMAL ML/MIN/{1.73_M2}
GLUCOSE BLD-MCNC: 133 MG/DL (ref 70–99)
HCT VFR BLD CALC: 45.3 % (ref 41–53)
HEMOGLOBIN: 14.6 G/DL (ref 13.5–17.5)
IMMATURE GRANULOCYTES: ABNORMAL %
LYMPHOCYTES # BLD: 16 % (ref 13–44)
MCH RBC QN AUTO: 25.8 PG (ref 26–34)
MCHC RBC AUTO-ENTMCNC: 32.3 G/DL (ref 31–37)
MCV RBC AUTO: 79.8 FL (ref 80–100)
MONOCYTES # BLD: 10 % (ref 5–9)
NRBC AUTOMATED: ABNORMAL PER 100 WBC
PDW BLD-RTO: 15.8 % (ref 12.1–15.2)
PLATELET # BLD: 252 K/UL (ref 140–450)
PLATELET ESTIMATE: ABNORMAL
PMV BLD AUTO: ABNORMAL FL (ref 6–12)
POTASSIUM SERPL-SCNC: 3.6 MMOL/L (ref 3.7–5.3)
PRO-BNP: 215 PG/ML
RBC # BLD: 5.68 M/UL (ref 4.5–5.9)
RBC # BLD: ABNORMAL 10*6/UL
SEG NEUTROPHILS: 70 % (ref 39–75)
SEGMENTED NEUTROPHILS ABSOLUTE COUNT: 6.1 K/UL (ref 2.1–6.5)
SODIUM BLD-SCNC: 139 MMOL/L (ref 135–144)
TROPONIN INTERP: NORMAL
TROPONIN T: <0.03 NG/ML
TROPONIN, HIGH SENSITIVITY: NORMAL NG/L (ref 0–22)
WBC # BLD: 8.8 K/UL (ref 3.5–11)
WBC # BLD: ABNORMAL 10*3/UL

## 2020-04-24 PROCEDURE — 6360000002 HC RX W HCPCS: Performed by: FAMILY MEDICINE

## 2020-04-24 PROCEDURE — 94664 DEMO&/EVAL PT USE INHALER: CPT

## 2020-04-24 PROCEDURE — 6370000000 HC RX 637 (ALT 250 FOR IP): Performed by: FAMILY MEDICINE

## 2020-04-24 PROCEDURE — 36415 COLL VENOUS BLD VENIPUNCTURE: CPT

## 2020-04-24 PROCEDURE — 94640 AIRWAY INHALATION TREATMENT: CPT

## 2020-04-24 PROCEDURE — 83880 ASSAY OF NATRIURETIC PEPTIDE: CPT

## 2020-04-24 PROCEDURE — 80048 BASIC METABOLIC PNL TOTAL CA: CPT

## 2020-04-24 PROCEDURE — 84484 ASSAY OF TROPONIN QUANT: CPT

## 2020-04-24 PROCEDURE — 96374 THER/PROPH/DIAG INJ IV PUSH: CPT

## 2020-04-24 PROCEDURE — 71045 X-RAY EXAM CHEST 1 VIEW: CPT

## 2020-04-24 PROCEDURE — 93005 ELECTROCARDIOGRAM TRACING: CPT | Performed by: FAMILY MEDICINE

## 2020-04-24 PROCEDURE — 85025 COMPLETE CBC W/AUTO DIFF WBC: CPT

## 2020-04-24 PROCEDURE — 99285 EMERGENCY DEPT VISIT HI MDM: CPT

## 2020-04-24 RX ORDER — PREDNISONE 20 MG/1
60 TABLET ORAL DAILY
Qty: 15 TABLET | Refills: 0 | Status: SHIPPED | OUTPATIENT
Start: 2020-04-24 | End: 2020-04-29

## 2020-04-24 RX ORDER — IPRATROPIUM BROMIDE AND ALBUTEROL SULFATE 2.5; .5 MG/3ML; MG/3ML
SOLUTION RESPIRATORY (INHALATION)
Status: DISCONTINUED
Start: 2020-04-24 | End: 2020-04-25 | Stop reason: HOSPADM

## 2020-04-24 RX ORDER — IPRATROPIUM BROMIDE AND ALBUTEROL SULFATE 2.5; .5 MG/3ML; MG/3ML
1 SOLUTION RESPIRATORY (INHALATION) ONCE
Status: COMPLETED | OUTPATIENT
Start: 2020-04-24 | End: 2020-04-24

## 2020-04-24 RX ORDER — IPRATROPIUM BROMIDE AND ALBUTEROL SULFATE 2.5; .5 MG/3ML; MG/3ML
1 SOLUTION RESPIRATORY (INHALATION) EVERY 6 HOURS PRN
Qty: 240 ML | Refills: 0 | Status: SHIPPED | OUTPATIENT
Start: 2020-04-24 | End: 2021-03-15

## 2020-04-24 RX ORDER — METHYLPREDNISOLONE SODIUM SUCCINATE 125 MG/2ML
125 INJECTION, POWDER, LYOPHILIZED, FOR SOLUTION INTRAMUSCULAR; INTRAVENOUS ONCE
Status: COMPLETED | OUTPATIENT
Start: 2020-04-24 | End: 2020-04-24

## 2020-04-24 RX ORDER — FUROSEMIDE 20 MG/1
20 TABLET ORAL DAILY
Qty: 5 TABLET | Refills: 0 | Status: SHIPPED | OUTPATIENT
Start: 2020-04-24 | End: 2020-04-30 | Stop reason: SDUPTHER

## 2020-04-24 RX ADMIN — IPRATROPIUM BROMIDE AND ALBUTEROL SULFATE 1 AMPULE: .5; 3 SOLUTION RESPIRATORY (INHALATION) at 23:06

## 2020-04-24 RX ADMIN — METHYLPREDNISOLONE SODIUM SUCCINATE 125 MG: 125 INJECTION, POWDER, FOR SOLUTION INTRAMUSCULAR; INTRAVENOUS at 22:55

## 2020-04-24 RX ADMIN — IPRATROPIUM BROMIDE AND ALBUTEROL SULFATE 1 AMPULE: .5; 3 SOLUTION RESPIRATORY (INHALATION) at 23:27

## 2020-04-24 ASSESSMENT — ENCOUNTER SYMPTOMS: SHORTNESS OF BREATH: 1

## 2020-04-25 LAB
EKG ATRIAL RATE: 89 BPM
EKG P AXIS: 0 DEGREES
EKG P-R INTERVAL: 174 MS
EKG Q-T INTERVAL: 384 MS
EKG QRS DURATION: 94 MS
EKG QTC CALCULATION (BAZETT): 467 MS
EKG R AXIS: 11 DEGREES
EKG T AXIS: 29 DEGREES
EKG VENTRICULAR RATE: 89 BPM

## 2020-04-25 PROCEDURE — 93010 ELECTROCARDIOGRAM REPORT: CPT | Performed by: INTERNAL MEDICINE

## 2020-04-25 NOTE — ED PROVIDER NOTES
by nebulization every 4 hours as needed for Wheezing or Shortness of Breath    ALBUTEROL SULFATE HFA (VENTOLIN HFA) 108 (90 BASE) MCG/ACT INHALER    Inhale 2 puffs into the lungs every 6 hours as needed for Wheezing    BUPRENORPHINE-NALOXONE (SUBOXONE) 8-2 MG SUBL SL TABLET    DISSOLVE 1 (ONE) UNDER THE TONGUE EVERY 12 HOURS    FENOFIBRATE 160 MG TABLET    Take 1 tablet by mouth daily    FLUTICASONE FUROATE-VILANTEROL (BREO ELLIPTA) 200-25 MCG/INH AEPB INHALER    Inhale 1 puff into the lungs daily    LISINOPRIL (PRINIVIL;ZESTRIL) 5 MG TABLET    Take 1 tablet by mouth daily    METOPROLOL TARTRATE (LOPRESSOR) 50 MG TABLET    TAKE 1 & 1/2 (ONE AND ONE-HALF) TABLETS BY MOUTH TWICE DAILY    TRIAMTERENE-HYDROCHLOROTHIAZIDE (MAXZIDE-25) 37.5-25 MG PER TABLET    Take 1 tablet by mouth daily       ALLERGIES     has No Known Allergies. FAMILY HISTORY     He indicated that his mother is . family history includes Cancer in his mother. SOCIAL HISTORY      reports that he has been smoking cigarettes. He has been smoking about 1.00 pack per day. He has never used smokeless tobacco. He reports previous drug use. He reports that he does not drink alcohol. PHYSICAL EXAM     INITIAL VITALS:  oral temperature is 98.6 °F (37 °C). His blood pressure is 166/110 (abnormal) and his pulse is 95. His respiration is 24 and oxygen saturation is 93%. Physical Exam   Constitutional: Patient is oriented to person, place, and time. Patient appears well-developed and well-nourished. Patient is active and cooperative. HENT:   Head: Normocephalic and atraumatic. Head is without contusion. Right Ear: Hearing and external ear normal. No drainage. Left Ear: Hearing and external ear normal. No drainage. Nose: Nose normal. No nasal deformity. No epistaxis. Mouth/Throat: Mucous membranes are not dry. Eyes: EOMI. Conjunctivae, sclera, and lids are normal. Right eye exhibits no discharge. Left eye exhibits no discharge. Neck: Full passive range of motion without pain and phonation normal.   Cardiovascular:  Normal rate, regular rhythm and intact distal pulses. Venous stasis dermatitis right lower extremity, some edema with pitting 1+ at greatest  Pulses: Right radial pulse  2+   Pulmonary/Chest: Effort normal. No tachypnea and no bradypnea. Bilateral wheezing at lung bases, right slightly greater than left, no gross rhonchi or rails  Abdominal: Increased BMI, soft, nontender  Musculoskeletal:   Left above the knee amputation    Except as otherwise noted, negative acute trauma or deformity,  apparent full range of motion and normal strength all extremities appropriate to age. Neurological: Patient is alert and oriented to person, place, and time. patient displays no tremor. Patient displays no seizure activity. Skin: Skin is warm and dry. Patient is not diaphoretic. Right anterior mid shin, there is a 2 cm roughly circular skin ulceration, no active drainage  Psychiatric: Patient has a normal mood and pleasant affect. Patient speech is normal and behavior is normal. Cognition and memory are normal.    DIFFERENTIAL DIAGNOSIS:   Nasser patient COPD, pneumonia bronchitis URI, viral illness NOS, pulmonary edema/CHF    DIAGNOSTIC RESULTS     EKG: All EKG's are interpreted by the Emergency Department Physician who either signs or Co-signs this chart in the absence of a cardiologist.  EKG    The patient had an EKG which is interpreted by me in the absence of a Cardiologist.   [] Without comparison to previous.    [x] With comparison to a previous EKG Dated 1/6/2020       EKG @ 2316 hrs -sinus rhythm, rate 89, normal axis, normal intervals, as compared to prior EKG, no significant changes morphology      RADIOLOGY: non-plain film images(s) such as CT, Ultrasound and MRI are read by the radiologist.  XR CHEST PORTABLE   Preliminary Result   Preliminary report only      IMPRESSION:      Pulmonary vascular congestion with mild edema is

## 2020-04-27 ENCOUNTER — TELEPHONE (OUTPATIENT)
Dept: ORTHOPEDIC SURGERY | Age: 55
End: 2020-04-27

## 2020-04-27 ENCOUNTER — CARE COORDINATION (OUTPATIENT)
Dept: CARE COORDINATION | Age: 55
End: 2020-04-27

## 2020-04-27 NOTE — TELEPHONE ENCOUNTER
Spoke with patient explaining that I spoke with his primary care provider; available on Thursday. Patient agreed to the appointment.

## 2020-04-27 NOTE — TELEPHONE ENCOUNTER
Spoke with patient that [facility] was waiting for a call back from providers office. Patient was agreeable.

## 2020-04-30 ENCOUNTER — OFFICE VISIT (OUTPATIENT)
Dept: FAMILY MEDICINE CLINIC | Age: 55
End: 2020-04-30
Payer: MEDICARE

## 2020-04-30 VITALS
SYSTOLIC BLOOD PRESSURE: 138 MMHG | RESPIRATION RATE: 20 BRPM | OXYGEN SATURATION: 93 % | TEMPERATURE: 97.7 F | HEIGHT: 75 IN | DIASTOLIC BLOOD PRESSURE: 88 MMHG | HEART RATE: 71 BPM | BODY MASS INDEX: 46.25 KG/M2

## 2020-04-30 PROBLEM — L03.119 CELLULITIS, LEG: Status: RESOLVED | Noted: 2019-06-13 | Resolved: 2020-04-30

## 2020-04-30 PROBLEM — J44.1 COPD EXACERBATION (HCC): Status: RESOLVED | Noted: 2020-01-06 | Resolved: 2020-04-30

## 2020-04-30 PROCEDURE — G8427 DOCREV CUR MEDS BY ELIG CLIN: HCPCS | Performed by: INTERNAL MEDICINE

## 2020-04-30 PROCEDURE — 3017F COLORECTAL CA SCREEN DOC REV: CPT | Performed by: INTERNAL MEDICINE

## 2020-04-30 PROCEDURE — 3023F SPIROM DOC REV: CPT | Performed by: INTERNAL MEDICINE

## 2020-04-30 PROCEDURE — 99214 OFFICE O/P EST MOD 30 MIN: CPT | Performed by: INTERNAL MEDICINE

## 2020-04-30 PROCEDURE — G8417 CALC BMI ABV UP PARAM F/U: HCPCS | Performed by: INTERNAL MEDICINE

## 2020-04-30 PROCEDURE — G8926 SPIRO NO PERF OR DOC: HCPCS | Performed by: INTERNAL MEDICINE

## 2020-04-30 PROCEDURE — 1036F TOBACCO NON-USER: CPT | Performed by: INTERNAL MEDICINE

## 2020-04-30 RX ORDER — FENOFIBRATE 160 MG/1
160 TABLET ORAL DAILY
Qty: 90 TABLET | Refills: 0 | Status: SHIPPED | OUTPATIENT
Start: 2020-04-30 | End: 2020-05-05

## 2020-04-30 RX ORDER — POTASSIUM CHLORIDE 750 MG/1
10 TABLET, EXTENDED RELEASE ORAL EVERY OTHER DAY
Qty: 15 TABLET | Refills: 5 | Status: SHIPPED | OUTPATIENT
Start: 2020-04-30 | End: 2020-09-02

## 2020-04-30 RX ORDER — FUROSEMIDE 20 MG/1
20 TABLET ORAL EVERY OTHER DAY
Qty: 15 TABLET | Refills: 5 | Status: SHIPPED | OUTPATIENT
Start: 2020-04-30 | End: 2020-09-02

## 2020-04-30 RX ORDER — METOPROLOL TARTRATE 50 MG/1
75 TABLET, FILM COATED ORAL 2 TIMES DAILY
Qty: 90 TABLET | Refills: 5 | Status: SHIPPED | OUTPATIENT
Start: 2020-04-30 | End: 2021-03-15 | Stop reason: SDUPTHER

## 2020-04-30 RX ORDER — TRIAMTERENE AND HYDROCHLOROTHIAZIDE 37.5; 25 MG/1; MG/1
1 TABLET ORAL DAILY
Qty: 90 TABLET | Refills: 0 | Status: SHIPPED | OUTPATIENT
Start: 2020-04-30 | End: 2020-05-05

## 2020-04-30 SDOH — ECONOMIC STABILITY: TRANSPORTATION INSECURITY
IN THE PAST 12 MONTHS, HAS THE LACK OF TRANSPORTATION KEPT YOU FROM MEDICAL APPOINTMENTS OR FROM GETTING MEDICATIONS?: NO

## 2020-04-30 SDOH — ECONOMIC STABILITY: TRANSPORTATION INSECURITY
IN THE PAST 12 MONTHS, HAS LACK OF TRANSPORTATION KEPT YOU FROM MEETINGS, WORK, OR FROM GETTING THINGS NEEDED FOR DAILY LIVING?: NO

## 2020-04-30 SDOH — ECONOMIC STABILITY: FOOD INSECURITY: WITHIN THE PAST 12 MONTHS, THE FOOD YOU BOUGHT JUST DIDN'T LAST AND YOU DIDN'T HAVE MONEY TO GET MORE.: NEVER TRUE

## 2020-04-30 SDOH — ECONOMIC STABILITY: INCOME INSECURITY: HOW HARD IS IT FOR YOU TO PAY FOR THE VERY BASICS LIKE FOOD, HOUSING, MEDICAL CARE, AND HEATING?: NOT HARD AT ALL

## 2020-04-30 SDOH — ECONOMIC STABILITY: FOOD INSECURITY: WITHIN THE PAST 12 MONTHS, YOU WORRIED THAT YOUR FOOD WOULD RUN OUT BEFORE YOU GOT MONEY TO BUY MORE.: NEVER TRUE

## 2020-04-30 ASSESSMENT — ENCOUNTER SYMPTOMS
TROUBLE SWALLOWING: 0
SORE THROAT: 0
ALLERGIC/IMMUNOLOGIC NEGATIVE: 1
CHEST TIGHTNESS: 0
BLOOD IN STOOL: 0
ABDOMINAL PAIN: 0
COUGH: 1
VOICE CHANGE: 0
WHEEZING: 0
NAUSEA: 0
SHORTNESS OF BREATH: 1
SINUS PRESSURE: 0
HEMOPTYSIS: 0
RHINORRHEA: 0
DIFFICULTY BREATHING: 1
CONSTIPATION: 0

## 2020-04-30 ASSESSMENT — COPD QUESTIONNAIRES: COPD: 1

## 2020-04-30 NOTE — PROGRESS NOTES
HPI Notes    Name: Gloria Ayoub  : 1965         Chief Complaint:     Chief Complaint   Patient presents with    Check-Up     States \"been having more breathing issues than normal\"    COPD    Hypertension    Hyperlipidemia       History of Present Illness:        Gregorio Pringle presents to office to follow up for recent COPD exacerbation, HTN, Hyperlipidemia  Also c/o swelling in the legs, abdominal wall  States quit smoking completely since ER visit on 20 for COPD exacerbation  Finished Prednisone course and feels a lot better. COPD   He complains of cough, difficulty breathing and shortness of breath. There is no chest tightness, hemoptysis or wheezing. This is a chronic problem. The current episode started more than 1 year ago. The problem occurs 2 to 4 times per day. The problem has been gradually improving. The cough is productive of sputum. Associated symptoms include dyspnea on exertion and headaches (occasional). Pertinent negatives include no appetite change, chest pain, ear congestion, ear pain, fever, malaise/fatigue, myalgias, nasal congestion, rhinorrhea, sore throat or trouble swallowing. His symptoms are aggravated by change in weather, strenuous activity and lying down. His symptoms are alleviated by beta-agonist, diuretics, ipratropium and steroid inhaler. He reports significant improvement on treatment. Risk factors for lung disease include smoking/tobacco exposure. His past medical history is significant for COPD. Hypertension   This is a chronic problem. The current episode started more than 1 year ago. The problem is unchanged. The problem is controlled. Associated symptoms include headaches (occasional) and shortness of breath. Pertinent negatives include no anxiety, chest pain, malaise/fatigue or palpitations. There are no associated agents to hypertension. Risk factors for coronary artery disease include dyslipidemia, obesity, male gender and smoking/tobacco exposure.  Past Rate and Rhythm: Normal rate and regular rhythm. Heart sounds: Normal heart sounds. No murmur. Pulmonary:      Effort: Pulmonary effort is normal.      Breath sounds: Wheezing (scaterred BL mild wheezing) present. No rhonchi or rales. Abdominal:      General: Bowel sounds are normal. There is no distension. Palpations: Abdomen is soft. There is no mass. Tenderness: There is no abdominal tenderness. Musculoskeletal: Normal range of motion. Right lower leg: Edema present. Left lower leg: Edema (s/p AKA) present. Lymphadenopathy:      Cervical: No cervical adenopathy. Skin:     General: Skin is warm and dry. Coloration: Skin is not jaundiced or pale. Findings: Rash (RLE with chronic red-brown discoloration ) present. Neurological:      Mental Status: He is alert and oriented to person, place, and time. Psychiatric:         Mood and Affect: Mood normal.         Behavior: Behavior normal.         Thought Content: Thought content normal.         Judgment: Judgment normal.               Data:     Lab Results   Component Value Date     04/24/2020    K 3.6 04/24/2020    CL 99 04/24/2020    CO2 29 04/24/2020    BUN 16 04/24/2020    CREATININE 0.66 04/24/2020    GLUCOSE 133 04/24/2020    PROT 9.1 01/06/2020    LABALBU 4.1 01/06/2020    BILITOT 0.57 01/06/2020    ALKPHOS 107 01/06/2020    AST 48 01/06/2020    ALT 63 01/06/2020     Lab Results   Component Value Date    WBC 8.8 04/24/2020    RBC 5.68 04/24/2020    HGB 14.6 04/24/2020    HCT 45.3 04/24/2020    MCV 79.8 04/24/2020    MCH 25.8 04/24/2020    MCHC 32.3 04/24/2020    RDW 15.8 04/24/2020     04/24/2020    MPV NOT REPORTED 04/24/2020     No results found for: TSH  No results found for: CHOL, HDL, PSA, LABA1C       Assessment & Plan        Diagnosis Orders   1. Chronic obstructive pulmonary disease with acute exacerbation (HCC)   Clinically improved with Prednisone course. Quit smoking.  Continue using duoneb Radha gonzales    2. Essential hypertension   Blood pressure controlled on current meds. Continue same. Noted to have mild Hypokalemia 3.6 on BMP from 4/24/20. Add potassium supplement triamterene-hydrochlorothiazide (MAXZIDE-25) 37.5-25 MG per tablet    metoprolol tartrate (LOPRESSOR) 50 MG tablet   3. Morbid obesity with BMI of 45.0-49.9, adult [facility])   Discussed high risk with obesity related comorbidities. Advised to adhere to low calorie diet 7002-7250/day. 4. Edema, unspecified type   Patient would like to try Lasix every other day to help with swelling in the legs and abdominal wall.  furosemide (LASIX) 20 MG tablet   5. Hyperlipidemia, unspecified hyperlipidemia type  Lipid Panel    fenofibrate (TRIGLIDE) 160 MG tablet    follow up in 3 months                 Completed Refills   Requested Prescriptions     Signed Prescriptions Disp Refills    furosemide (LASIX) 20 MG tablet 15 tablet 5     Sig: Take 1 tablet by mouth every other day    potassium chloride (KLOR-CON M) 10 MEQ extended release tablet 15 tablet 5     Sig: Take 1 tablet by mouth every other day    fenofibrate (TRIGLIDE) 160 MG tablet 90 tablet 0     Sig: Take 1 tablet by mouth daily    triamterene-hydrochlorothiazide (MAXZIDE-25) 37.5-25 MG per tablet 90 tablet 0     Sig: Take 1 tablet by mouth daily    metoprolol tartrate (LOPRESSOR) 50 MG tablet 90 tablet 5     Sig: Take 1.5 tablets by mouth 2 times daily     Return in about 3 months (around 7/30/2020) for copd, HTN, hyperlipidemia.      Orders Placed This Encounter   Medications    furosemide (LASIX) 20 MG tablet     Sig: Take 1 tablet by mouth every other day     Dispense:  15 tablet     Refill:  5    potassium chloride (KLOR-CON M) 10 MEQ extended release tablet     Sig: Take 1 tablet by mouth every other day     Dispense:  15 tablet     Refill:  5    fenofibrate (TRIGLIDE) 160 MG tablet     Sig: Take 1 tablet by mouth daily     Dispense:  90 tablet     Refill:  0   

## 2020-05-05 RX ORDER — TRIAMTERENE AND HYDROCHLOROTHIAZIDE 37.5; 25 MG/1; MG/1
TABLET ORAL
Qty: 90 TABLET | Refills: 0 | Status: SHIPPED | OUTPATIENT
Start: 2020-05-05 | End: 2020-09-02

## 2020-05-05 RX ORDER — FENOFIBRATE 160 MG/1
TABLET ORAL
Qty: 90 TABLET | Refills: 0 | Status: SHIPPED | OUTPATIENT
Start: 2020-05-05 | End: 2020-09-02

## 2020-05-05 NOTE — TELEPHONE ENCOUNTER
Medication pending. Peter Bentley Health Maintenance   Topic Date Due    Lipid screen  05/27/2006    Colon cancer screen colonoscopy  05/27/2016    Diabetes screen  05/30/2020 (Originally 5/27/2006)    DTaP/Tdap/Td vaccine (1 - Tdap) 04/30/2021 (Originally 5/27/1985)    Shingles Vaccine (1 of 2) 04/30/2021 (Originally 5/27/2016)    HIV screen  04/30/2021 (Originally 5/27/1981)    Annual Wellness Visit (AWV)  04/30/2022 (Originally 6/18/2019)    Potassium monitoring  04/24/2021    Creatinine monitoring  04/24/2021    Flu vaccine  Completed    Pneumococcal 0-64 years Vaccine  Completed    Hepatitis A vaccine  Aged Out    Hepatitis B vaccine  Aged Out    Hib vaccine  Aged Out    Meningococcal (ACWY) vaccine  Aged Out             (applicable per patient's age: Cancer Screenings, Depression Screening, Fall Risk Screening, Immunizations)    AST (U/L)   Date Value   01/06/2020 48 (H)     ALT (U/L)   Date Value   01/06/2020 63 (H)     BUN (mg/dL)   Date Value   04/24/2020 16      (goal A1C is < 7)   (goal LDL is <100) need 30-50% reduction from baseline     BP Readings from Last 3 Encounters:   04/30/20 138/88   04/24/20 (!) 175/98   03/24/20 (!) 150/93    (goal /80)      All Future Testing planned in [facility]:  Lab Frequency Next Occurrence   CT Chest WO Contrast Once 04/08/2020   Baseline Diagnostic Sleep Study Once 01/27/2020   Sleep Study with PAP Titration Once 01/27/2020   6 Minute Walk Test Once 04/23/2020   Lipid Panel Once 04/30/2020       Next Visit Date:  No future appointments.          Patient Active Problem List:     Cellulitis of lower leg     Hemoptysis     Chronic obstructive pulmonary disease with acute exacerbation (HCC)     HCV (hepatitis C virus)     HTN (hypertension)     Hx MRSA infection

## 2020-09-02 RX ORDER — TRIAMTERENE AND HYDROCHLOROTHIAZIDE 37.5; 25 MG/1; MG/1
TABLET ORAL
Qty: 90 TABLET | Refills: 0 | Status: SHIPPED | OUTPATIENT
Start: 2020-09-02 | End: 2021-01-27 | Stop reason: SDUPTHER

## 2020-09-02 RX ORDER — FUROSEMIDE 20 MG/1
20 TABLET ORAL EVERY OTHER DAY
Qty: 15 TABLET | Refills: 5 | Status: SHIPPED | OUTPATIENT
Start: 2020-09-02 | End: 2020-12-22 | Stop reason: SDUPTHER

## 2020-09-02 RX ORDER — FENOFIBRATE 160 MG/1
TABLET ORAL
Qty: 90 TABLET | Refills: 0 | Status: SHIPPED | OUTPATIENT
Start: 2020-09-02 | End: 2021-01-27 | Stop reason: SDUPTHER

## 2020-09-02 RX ORDER — POTASSIUM CHLORIDE 750 MG/1
10 TABLET, EXTENDED RELEASE ORAL EVERY OTHER DAY
Qty: 15 TABLET | Refills: 5 | Status: SHIPPED | OUTPATIENT
Start: 2020-09-02 | End: 2020-12-22 | Stop reason: SDUPTHER

## 2020-11-24 RX ORDER — METOPROLOL TARTRATE 50 MG/1
50 TABLET, FILM COATED ORAL 2 TIMES DAILY
Qty: 60 TABLET | Refills: 2 | Status: SHIPPED | OUTPATIENT
Start: 2020-11-24 | End: 2021-01-27 | Stop reason: SDUPTHER

## 2020-11-24 NOTE — TELEPHONE ENCOUNTER
Pharmacy contacted the office questioning if the provider could send new prescription for Metoprolol 50 mg BID with 90 day suppy. Informed the technician the provider would be informed of the request. She voiced understanding.

## 2020-11-30 RX ORDER — METOPROLOL TARTRATE 50 MG/1
TABLET, FILM COATED ORAL
Qty: 60 TABLET | Refills: 2 | OUTPATIENT
Start: 2020-11-30

## 2020-12-21 RX ORDER — TRIAMTERENE AND HYDROCHLOROTHIAZIDE 37.5; 25 MG/1; MG/1
1 TABLET ORAL DAILY
Qty: 30 TABLET | Refills: 0 | Status: SHIPPED | OUTPATIENT
Start: 2020-12-21 | End: 2020-12-22 | Stop reason: SDUPTHER

## 2020-12-21 NOTE — TELEPHONE ENCOUNTER
Refill request  Last OV 4/16/2020  Last date RX filled 9/2/20  Next scheduled appt Visit date not found  Attempted to contact patient but the call went straight to voicemail, but the mailbox is not set up. Medication has been pended if approved.

## 2020-12-21 NOTE — TELEPHONE ENCOUNTER
Health Maintenance   Topic Date Due    Hepatitis B vaccine (1 of 3 - Risk 3-dose series) 05/27/1985    Lipid screen  05/27/2006    Diabetes screen  05/27/2006    Colon cancer screen colonoscopy  05/27/2016    Flu vaccine (1) 09/01/2020    DTaP/Tdap/Td vaccine (1 - Tdap) 04/30/2021 (Originally 5/27/1985)    Shingles Vaccine (1 of 2) 04/30/2021 (Originally 5/27/2016)    HIV screen  04/30/2021 (Originally 5/27/1981)    Annual Wellness Visit (AWV)  04/30/2022 (Originally 6/18/2019)    Potassium monitoring  04/24/2021    Creatinine monitoring  04/24/2021    Pneumococcal 0-64 years Vaccine  Completed    Hepatitis A vaccine  Aged Out    Hib vaccine  Aged Out    Meningococcal (ACWY) vaccine  Aged Out             (applicable per patient's age: Cancer Screenings, Depression Screening, Fall Risk Screening, Immunizations)    AST (U/L)   Date Value   01/06/2020 48 (H)     ALT (U/L)   Date Value   01/06/2020 63 (H)     BUN (mg/dL)   Date Value   04/24/2020 16      (goal A1C is < 7)   (goal LDL is <100) need 30-50% reduction from baseline     BP Readings from Last 3 Encounters:   04/30/20 138/88   04/24/20 (!) 175/98   03/24/20 (!) 150/93    (goal /80)      All Future Testing planned in [facility]:  Lab Frequency Next Occurrence   6 Minute Walk Test Once 04/23/2020   Lipid Panel Once 04/30/2021       Next Visit Date:  No future appointments.          Patient Active Problem List:     Cellulitis of lower leg     Hemoptysis     Chronic obstructive pulmonary disease with acute exacerbation (HCC)     HCV (hepatitis C virus)     HTN (hypertension)     Hx MRSA infection

## 2020-12-22 ENCOUNTER — TELEPHONE (OUTPATIENT)
Dept: FAMILY MEDICINE CLINIC | Age: 55
End: 2020-12-22

## 2020-12-22 ENCOUNTER — HOSPITAL ENCOUNTER (OUTPATIENT)
Age: 55
Setting detail: SPECIMEN
Discharge: HOME | End: 2020-12-22
Payer: MEDICARE

## 2020-12-22 PROCEDURE — C9803 HOPD COVID-19 SPEC COLLECT: HCPCS

## 2020-12-22 PROCEDURE — U0003 INFECTIOUS AGENT DETECTION BY NUCLEIC ACID (DNA OR RNA); SEVERE ACUTE RESPIRATORY SYNDROME CORONAVIRUS 2 (SARS-COV-2) (CORONAVIRUS DISEASE [COVID-19]), AMPLIFIED PROBE TECHNIQUE, MAKING USE OF HIGH THROUGHPUT TECHNOLOGIES AS DESCRIBED BY CMS-2020-01-R: HCPCS

## 2020-12-22 RX ORDER — TRIAMTERENE AND HYDROCHLOROTHIAZIDE 37.5; 25 MG/1; MG/1
1 TABLET ORAL DAILY
Qty: 30 TABLET | Refills: 0 | Status: SHIPPED | OUTPATIENT
Start: 2020-12-22 | End: 2021-03-01 | Stop reason: SDUPTHER

## 2020-12-22 RX ORDER — POTASSIUM CHLORIDE 750 MG/1
10 TABLET, EXTENDED RELEASE ORAL EVERY OTHER DAY
Qty: 15 TABLET | Refills: 5 | Status: SHIPPED | OUTPATIENT
Start: 2020-12-22 | End: 2021-01-27 | Stop reason: SDUPTHER

## 2020-12-22 RX ORDER — FUROSEMIDE 20 MG/1
20 TABLET ORAL EVERY OTHER DAY
Qty: 15 TABLET | Refills: 5 | Status: SHIPPED | OUTPATIENT
Start: 2020-12-22 | End: 2021-01-27 | Stop reason: SDUPTHER

## 2020-12-22 NOTE — TELEPHONE ENCOUNTER
Medications pending      Health Maintenance   Topic Date Due    Hepatitis B vaccine (1 of 3 - Risk 3-dose series) 05/27/1985    Lipid screen  05/27/2006    Diabetes screen  05/27/2006    Colon cancer screen colonoscopy  05/27/2016    Flu vaccine (1) 09/01/2020    DTaP/Tdap/Td vaccine (1 - Tdap) 04/30/2021 (Originally 5/27/1985)    Shingles Vaccine (1 of 2) 04/30/2021 (Originally 5/27/2016)    HIV screen  04/30/2021 (Originally 5/27/1981)    Annual Wellness Visit (AWV)  04/30/2022 (Originally 6/18/2019)    Potassium monitoring  04/24/2021    Creatinine monitoring  04/24/2021    Pneumococcal 0-64 years Vaccine  Completed    Hepatitis A vaccine  Aged Out    Hib vaccine  Aged Out    Meningococcal (ACWY) vaccine  Aged Out             (applicable per patient's age: Cancer Screenings, Depression Screening, Fall Risk Screening, Immunizations)    AST (U/L)   Date Value   01/06/2020 48 (H)     ALT (U/L)   Date Value   01/06/2020 63 (H)     BUN (mg/dL)   Date Value   04/24/2020 16      (goal A1C is < 7)   (goal LDL is <100) need 30-50% reduction from baseline     BP Readings from Last 3 Encounters:   04/30/20 138/88   04/24/20 (!) 175/98   03/24/20 (!) 150/93    (goal /80)      All Future Testing planned in [facility]:  Lab Frequency Next Occurrence   6 Minute Walk Test Once 04/23/2020   Lipid Panel Once 04/30/2021       Next Visit Date:  No future appointments.          Patient Active Problem List:     Cellulitis of lower leg     Hemoptysis     Chronic obstructive pulmonary disease with acute exacerbation (HCC)     HCV (hepatitis C virus)     HTN (hypertension)     Hx MRSA infection

## 2020-12-23 RX ORDER — TRIAMTERENE AND HYDROCHLOROTHIAZIDE 37.5; 25 MG/1; MG/1
1 TABLET ORAL DAILY
Qty: 30 TABLET | Refills: 0 | OUTPATIENT
Start: 2020-12-23

## 2020-12-24 LAB — SARS-COV-2, NAA: NOT DETECTED

## 2020-12-28 ENCOUNTER — TELEPHONE (OUTPATIENT)
Dept: FAMILY MEDICINE CLINIC | Age: 55
End: 2020-12-28

## 2020-12-28 NOTE — TELEPHONE ENCOUNTER
After speaking with Dr. Shabbir Hermosillo who also tried reaching patient several times we mailed a letter to patient letting him know results are negative.

## 2021-01-27 DIAGNOSIS — R60.9 EDEMA, UNSPECIFIED TYPE: ICD-10-CM

## 2021-01-27 DIAGNOSIS — E78.5 HYPERLIPIDEMIA, UNSPECIFIED HYPERLIPIDEMIA TYPE: ICD-10-CM

## 2021-01-27 DIAGNOSIS — I10 ESSENTIAL HYPERTENSION: ICD-10-CM

## 2021-01-27 RX ORDER — METOPROLOL TARTRATE 50 MG/1
50 TABLET, FILM COATED ORAL 2 TIMES DAILY
Qty: 60 TABLET | Refills: 2 | Status: SHIPPED | OUTPATIENT
Start: 2021-01-27 | End: 2021-03-01 | Stop reason: SDUPTHER

## 2021-01-27 RX ORDER — FUROSEMIDE 20 MG/1
20 TABLET ORAL EVERY OTHER DAY
Qty: 15 TABLET | Refills: 5 | Status: SHIPPED | OUTPATIENT
Start: 2021-01-27 | End: 2022-10-06

## 2021-01-27 RX ORDER — LISINOPRIL 5 MG/1
5 TABLET ORAL DAILY
Qty: 30 TABLET | Refills: 3 | Status: SHIPPED | OUTPATIENT
Start: 2021-01-27 | End: 2021-06-09

## 2021-01-27 RX ORDER — POTASSIUM CHLORIDE 750 MG/1
10 TABLET, EXTENDED RELEASE ORAL EVERY OTHER DAY
Qty: 15 TABLET | Refills: 5 | Status: SHIPPED | OUTPATIENT
Start: 2021-01-27

## 2021-01-27 RX ORDER — FENOFIBRATE 160 MG/1
TABLET ORAL
Qty: 90 TABLET | Refills: 1 | Status: SHIPPED | OUTPATIENT
Start: 2021-01-27 | End: 2021-06-09

## 2021-01-27 RX ORDER — TRIAMTERENE AND HYDROCHLOROTHIAZIDE 37.5; 25 MG/1; MG/1
TABLET ORAL
Qty: 90 TABLET | Refills: 1 | Status: SHIPPED | OUTPATIENT
Start: 2021-01-27

## 2021-01-27 NOTE — TELEPHONE ENCOUNTER
Refill request  Last OV 4/16/2020  Last date RX filled 12/22/21  Next scheduled appt 01/28/21  Medicine pended if approved    Health Maintenance   Topic Date Due    Hepatitis B vaccine (1 of 3 - Risk 3-dose series) 05/27/1985    Lipid screen  05/27/2006    Diabetes screen  05/27/2006    Colon cancer screen colonoscopy  05/27/2016    Flu vaccine (1) 09/01/2020    DTaP/Tdap/Td vaccine (1 - Tdap) 04/30/2021 (Originally 5/27/1985)    Shingles Vaccine (1 of 2) 04/30/2021 (Originally 5/27/2016)    HIV screen  04/30/2021 (Originally 5/27/1981)    Annual Wellness Visit (AWV)  04/30/2022 (Originally 6/18/2019)    Potassium monitoring  04/24/2021    Creatinine monitoring  04/24/2021    Pneumococcal 0-64 years Vaccine  Completed    Hepatitis A vaccine  Aged Out    Hib vaccine  Aged Out    Meningococcal (ACWY) vaccine  Aged Out             (applicable per patient's age: Cancer Screenings, Depression Screening, Fall Risk Screening, Immunizations)    AST (U/L)   Date Value   01/06/2020 48 (H)     ALT (U/L)   Date Value   01/06/2020 63 (H)     BUN (mg/dL)   Date Value   04/24/2020 16      (goal A1C is < 7)   (goal LDL is <100) need 30-50% reduction from baseline     BP Readings from Last 3 Encounters:   04/30/20 138/88   04/24/20 (!) 175/98   03/24/20 (!) 150/93    (goal /80)      All Future Testing planned in [facility]:  Lab Frequency Next Occurrence   6 Minute Walk Test Once 04/23/2020   Lipid Panel Once 04/30/2021       Next Visit Date:  Future Appointments   Date Time Provider Yajaira Sahni   1/28/2021  1:45 PM Patti Rosenberg MD [facility]            Patient Active Problem List:     Cellulitis of lower leg     Hemoptysis     Chronic obstructive pulmonary disease with acute exacerbation (HCC)     HCV (hepatitis C virus)     HTN (hypertension)     Hx MRSA infection

## 2021-03-01 ENCOUNTER — OFFICE VISIT (OUTPATIENT)
Dept: FAMILY MEDICINE CLINIC | Age: 56
End: 2021-03-01
Payer: MEDICARE

## 2021-03-01 DIAGNOSIS — Z00.00 ROUTINE GENERAL MEDICAL EXAMINATION AT A HEALTH CARE FACILITY: ICD-10-CM

## 2021-03-01 DIAGNOSIS — Z00.00 ENCOUNTER FOR ANNUAL WELLNESS EXAM IN MEDICARE PATIENT: Primary | ICD-10-CM

## 2021-03-01 DIAGNOSIS — Z23 NEED FOR INFLUENZA VACCINATION: ICD-10-CM

## 2021-03-01 PROCEDURE — 90686 IIV4 VACC NO PRSV 0.5 ML IM: CPT | Performed by: INTERNAL MEDICINE

## 2021-03-01 PROCEDURE — 3017F COLORECTAL CA SCREEN DOC REV: CPT | Performed by: INTERNAL MEDICINE

## 2021-03-01 PROCEDURE — G8482 FLU IMMUNIZE ORDER/ADMIN: HCPCS | Performed by: INTERNAL MEDICINE

## 2021-03-01 PROCEDURE — G0008 ADMIN INFLUENZA VIRUS VAC: HCPCS | Performed by: INTERNAL MEDICINE

## 2021-03-01 PROCEDURE — G0438 PPPS, INITIAL VISIT: HCPCS | Performed by: INTERNAL MEDICINE

## 2021-03-01 ASSESSMENT — PATIENT HEALTH QUESTIONNAIRE - PHQ9
SUM OF ALL RESPONSES TO PHQ9 QUESTIONS 1 & 2: 0
SUM OF ALL RESPONSES TO PHQ QUESTIONS 1-9: 0
2. FEELING DOWN, DEPRESSED OR HOPELESS: 0
SUM OF ALL RESPONSES TO PHQ QUESTIONS 1-9: 0
SUM OF ALL RESPONSES TO PHQ QUESTIONS 1-9: 0

## 2021-03-01 ASSESSMENT — LIFESTYLE VARIABLES
HOW OFTEN DO YOU HAVE A DRINK CONTAINING ALCOHOL: NEVER
AUDIT-C TOTAL SCORE: INCOMPLETE
AUDIT TOTAL SCORE: INCOMPLETE
HOW OFTEN DO YOU HAVE A DRINK CONTAINING ALCOHOL: 0

## 2021-03-01 NOTE — PROGRESS NOTES
Medicare Annual Wellness Visit  Name: Gemma Kaye Date: 3/2/2021   MRN: [id number] Sex: Male   Age: 48 y.o. Ethnicity: Non-/Non    : 1965 Race: Callie Brannon is here for Medicare AWV (Patient presents today for AWV.)    Screenings for behavioral, psychosocial and functional/safety risks, and cognitive dysfunction are all negative except as indicated below. These results, as well as other patient data from the [address] form, are documented in Flowsheets linked to this Encounter. No Known Allergies      Prior to Visit Medications    Medication Sig Taking?  Authorizing Provider   furosemide (LASIX) 20 MG tablet Take 1 tablet by mouth every other day Yes Riky Sanford MD   potassium chloride (KLOR-CON M) 10 MEQ extended release tablet Take 1 tablet by mouth every other day Yes Riky Sanford MD   lisinopril (PRINIVIL;ZESTRIL) 5 MG tablet Take 1 tablet by mouth daily Yes Riky Sanford MD   fenofibrate (TRIGLIDE) 160 MG tablet TAKE 1 TABLET BY MOUTH EVERY DAY Yes Riky Sanford MD   triamterene-hydroCHLOROthiazide (MAXZIDE-25) 37.5-25 MG per tablet TAKE 1 TABLET BY MOUTH EVERY DAY Yes Riky Sanford MD   metoprolol tartrate (LOPRESSOR) 50 MG tablet Take 1.5 tablets by mouth 2 times daily Yes Riky Sanford MD   ipratropium-albuterol (DUONEB) 0.5-2.5 (3) MG/3ML SOLN nebulizer solution Inhale 3 mLs into the lungs every 6 hours as needed for Shortness of Breath Yes Aurelia Fan MD   albuterol (PROVENTIL) (2.5 MG/3ML) 0.083% nebulizer solution Take 3 mLs by nebulization every 4 hours as needed for Wheezing or Shortness of Breath Yes Wilmar Peoples MD   buprenorphine-naloxone (SUBOXONE) 8-2 MG SUBL SL tablet DISSOLVE 1 (ONE) UNDER THE TONGUE EVERY 12 HOURS Yes Tim Rivera MD   albuterol sulfate HFA (VENTOLIN HFA) 108 (90 Base) MCG/ACT inhaler Inhale 2 puffs into the lungs every 6 hours as needed for Wheezing Yes Riky Sanford MD Fluticasone furoate-vilanterol (BREO ELLIPTA) 200-25 MCG/INH AEPB inhaler Inhale 1 puff into the lungs daily  Patient not taking: Reported on 3/1/2021  ANTONIA Granegr CNP         Past Medical History:   Diagnosis Date    Addiction to drug [facility])     CAD (coronary artery disease)     [facility] obstructive pulmonary disease) ([facility] [address]     Hypertension     Kidney failure, acute (HCC)     due to mrsa, reversed    MRSA (methicillin resistant Staphylococcus aureus)     Patient requiring acute dialysis [facility]) July 2010    D/T organ failure from MRSA       Past Surgical History:   Procedure Laterality Date    ABOVE KNEE AMPUTATION Left     ANKLE SURGERY      right; s/p crush injury    CARDIAC SURGERY      valve replacement    JOINT REPLACEMENT  knee replacement, removed, MRSA    KNEE SURGERY      several on lt    LUNG SURGERY           Family History   Problem Relation Age of Onset    Cancer Mother        CareTeam (Including outside providers/suppliers regularly involved in providing care):   Patient Care Team:  Lennox Chaves MD as PCP - General (Internal Medicine)  Lennox Chaves MD as PCP - [facility] Empaneled Provider    Wt Readings from Last 3 Encounters:   03/01/21 (!) 370 lb (167.8 kg)   03/24/20 (!) 370 lb (167.8 kg)   01/27/20 (!) 370 lb (167.8 kg)     Vitals:    03/01/21 1442   BP: 122/70   Site: Left Upper Arm   Position: Sitting   Cuff Size: Large Adult   Pulse: 69   Temp: 98.1 °F (36.7 °C)   SpO2: (!) 89%   Weight: (!) 370 lb (167.8 kg)     Body mass index is 46.25 kg/m². Based upon direct observation of the patient, evaluation of cognition reveals recent and remote memory intact.     General Appearance: in a wheelchair, alert and oriented to person, place and time, well developed and well- nourished, in no acute distress  Skin: warm and dry, no rash   Head: normocephalic and atraumatic  Eyes: pupils equal, round, and reactive to light, conjunctivae normal  ENT: tympanic membrane, external ear and ear canal normal bilaterally, nose without deformity, nasal mucosa and turbinates normal without polyps  Neck: supple and non-tender without mass, no thyromegaly, no cervical lymphadenopathy  Pulmonary/Chest: clear to auscultation bilaterally- no wheezes, rales or rhonchi,diminished BS BL, no respiratory distress  Cardiovascular: normal rate, regular rhythm, normal S1 and S2, no murmurs, rubs, clicks, or gallops  Abdomen: obese, soft, non-tender, non-distended, normal bowel sounds  Extremities: Left AKA, RLE swollen, chronic dermatitis skin changes noted lower parts, no cyanosis  Musculoskeletal: normal range of motion, no joint swelling, deformity or tenderness  Neurologic: no cranial nerve deficit, gait, coordination and speech normal    Patient's complete Health Risk Assessment and screening values have been reviewed and are found in Flowsheets. The following problems were reviewed today and where indicated follow up appointments were made and/or referrals ordered. Positive Risk Factor Screenings with Interventions:     Fall Risk:  Timed Up and Go Test > 12 seconds? (Complete if either Fall Risk answers are Yes): no  2 or more falls in past year?: (!) yes  Fall with injury in past year?: no  Fall Risk Interventions:    · Home safety tips provided  · Patient declines any further evaluation/treatment for this issue   · States fell inside the house out of wheelchair  · Does not ambulate due to Left AKA  · Patient is a 1 pack/day smoker, discussed importance of smoking cessation. He will follow-up in about 2 weeks for interventions for tobacco cessation          General Health and ACP:  General  In general, how would you say your health is?: Fair  In the past 7 days, have you experienced any of the following?  New or Increased Pain, New or Increased Fatigue, Loneliness, Social Isolation, Stress or Anger?: (!) New or Increased Pain  Do you get the social and emotional support that you need?: Yes  Do you have a Living Will?: (!) No  Advance Directives     Power of [address] Will ACP-Advance Directive ACP-Power of     Not on File Not on File Not on File Not on File      General Health Risk Interventions:  · Pain issues: chhronic pain in the right knee. tales Ibuprofen and Tylanol. on Suboxone.    · No Living Will: Patient declines ACP discussion/assistance    Health Habits/Nutrition:  Health Habits/Nutrition  Do you exercise for at least 20 minutes 2-3 times per week?: (!) No  Have you lost any weight without trying in the past 3 months?: No  Do you eat only one meal per day?: No  Have you seen the dentist within the past year?: (!) No     Health Habits/Nutrition Interventions:  · Inadequate physical activity:  patient is not ready to increase his/her physical activity level at this time  · Dental exam overdue:  patient encouraged to make appointment with his/her dentist, states need all teeth pu;;ed out    Hearing/Vision:  No exam data present  Hearing/Vision  Do you or your family notice any trouble with your hearing that hasn't been managed with hearing aids?: No  Do you have difficulty driving, watching TV, or doing any of your daily activities because of your eyesight?: No  Have you had an eye exam within the past year?: (!) No  Hearing/Vision Interventions:  · Hearing concerns:  patient declines any further evaluation/treatment for hearing issues   · Advised to have eye examination      Personalized Preventive Plan   Current Health Maintenance Status  Immunization History   Administered Date(s) Administered    Influenza Vaccine, unspecified formulation 10/25/2016    Influenza Whole 11/01/2015    Influenza, Quadv, IM, PF (6 mo and older Fluzone, Flulaval, Fluarix, and 3 yrs and older Afluria) 01/07/2020, 03/01/2021    Pneumococcal Polysaccharide ([id number]) 01/16/2020        Health Maintenance   Topic Date Due    Hepatitis B vaccine (1 of 3 - Risk 3-dose series) Never done    Lipid screen Never done    Diabetes screen  Never done    Colon cancer screen colonoscopy  Never done    DTaP/Tdap/Td vaccine (1 - Tdap) 04/30/2021 (Originally 5/27/1985)    Shingles Vaccine (1 of 2) 04/30/2021 (Originally 5/27/2016)    HIV screen  04/30/2021 (Originally 5/27/1981)    Annual Wellness Visit (AWV)  04/30/2022 (Originally 6/18/2019)    Potassium monitoring  04/24/2021    Creatinine monitoring  04/24/2021    Flu vaccine  Completed    Pneumococcal 0-64 years Vaccine  Completed    Hepatitis A vaccine  Aged Out    Hib vaccine  Aged Out    Meningococcal (ACWY) vaccine  Aged Out     Recommendations for [employer] Due: see orders and patient instructions/AVS.  . Recommended screening schedule for the next 5-10 years is provided to the patient in written form: see Patient Instructions/AVS.    Martín Solitario was seen today for medicare awv.     Diagnoses and all orders for this visit:    Encounter for annual wellness exam in Medicare patient    Need for influenza vaccination  -     INFLUENZA, QUADV, 3 YRS AND OLDER, IM PF, PREFILL SYR OR SDV, 0.5ML (AFLURIA QUADV, PF)    Routine general medical examination at a health care facility

## 2021-03-01 NOTE — PATIENT INSTRUCTIONS
more dangerous for some people. Infants and  young children, people 72years of age and older,  pregnant women, and people with certain health  conditions or a weakened immune system are at  greatest risk. Each year thousands of people in the [facility] die  from flu, and many more are hospitalized. Flu vaccine can:   keep you from getting flu,   make flu less severe if you do get it, and   keep you from spreading flu to your family and  other people. 2 Inactivated and recombinant flu vaccines  A dose of flu vaccine is recommended every flu season. Children 6 months through 6years of age may need two  doses during the same flu season. Everyone else needs  only one dose each flu season. Some inactivated flu vaccines contain a very small  amount of a mercury-based preservative called  thimerosal. Studies have not shown thimerosal in  vaccines to be harmful, but flu vaccines that do not  contain thimerosal are available. There is no live flu virus in flu shots. They cannot cause  the flu. There are many flu viruses, and they are always  changing. Each year a new flu vaccine is made to protect  against three or four viruses that are likely to cause  disease in the upcoming flu season. But even when the  vaccine doesnt exactly match these viruses, it may still  provide some protection. Flu vaccine cannot prevent:   flu that is caused by a virus not covered by the vaccine,  or   illnesses that look like flu but are not. It takes about 2 weeks for protection to develop after  vaccination, and protection lasts through the flu season. 3 Some people should not get this vaccine  Tell the person who is giving you the vaccine:     If you have any severe, life-threatening allergies. If you ever had a life-threatening allergic reaction  after a dose of flu vaccine, or have a severe allergy to  any part of this vaccine, you may be advised not to  get vaccinated.  Most, but not all, types of flu vaccine  contain a small amount of egg protein.  If you ever had Guillain-Barré Syndrome (also  called GBS). Some people with a history of GBS should not get this  vaccine. This should be discussed with your doctor.  If you are not feeling well. It is usually okay to get flu vaccine when you have  a mild illness, but you might be asked to come back  when you feel better. 4 Risks of a vaccine reaction  With any medicine, including vaccines, there is a chance  of reactions. These are usually mild and go away on their  own, but serious reactions are also possible. Most people who get a flu shot do not have any problems  with it. Minor problems following a flu shot include:   soreness, redness, or swelling where the shot was  given   hoarseness   sore, red or itchy eyes   cough   fever   aches   headache   itching   fatigue  If these problems occur, they usually begin soon after the  shot and last 1 or 2 days. More serious problems following a flu shot can include  the following:   There may be a small increased risk of Guillain-Barré  Syndrome (GBS) after inactivated flu vaccine. This  risk has been estimated at 1 or 2 additional cases per  million people vaccinated. This is much lower than the  risk of severe complications from flu, which can be  prevented by flu vaccine.  Young children who get the flu shot along with  pneumococcal vaccine (PCV13) and/or DTaP vaccine  at the same time might be slightly more likely to have  a seizure caused by fever. Ask your doctor for more  information. Tell your doctor if a child who is getting  flu vaccine has ever had a seizure. Problems that could happen after any injected  vaccine:   People sometimes faint after a medical procedure,  including vaccination. Sitting or lying down for about  15 minutes can help prevent fainting, and injuries  caused by a fall. Tell your doctor if you feel dizzy, or  have vision changes or ringing in the ears.    Some people get severe pain in the shoulder and have  difficulty moving the arm where a shot was given. This  happens very rarely.  Any medication can cause a severe allergic reaction. Such reactions from a vaccine are very rare, estimated  at about 1 in a million doses, and would happen within  a few minutes to a few hours after the vaccination. As with any medicine, there is a very remote chance of a  vaccine causing a serious injury or death. The safety of vaccines is always being monitored. For  more information, visit: www.cdc.gov/vaccinesafety/              5 What if there is a serious reaction? What should I look for?  Look for anything that concerns you, such as signs  of a severe allergic reaction, very high fever, or  unusual behavior. Signs of a severe allergic reaction can include hives,  swelling of the face and throat, difficulty breathing,  a fast heartbeat, dizziness, and weakness. These  would start a few minutes to a few hours after the  Vaccination. What should I do?  If you think it is a severe allergic reaction or other  emergency that cant wait, call 9-1-1 and get the person  to the nearest hospital. Otherwise, call your doctor.  Reactions should be reported to the Vaccine Adverse  Event Reporting System (VAERS). Your doctor should  file this report, or you can do it yourself through the  VAERS web site at www.vaers. hhs.gov, or by calling  2-602.596.3454. VAERS does not give medical advice. 6 The National Vaccine Injury Compensation Program  The [facility] Vaccine Injury Compensation Program  (VICP) is a federal program that was created to  compensate people who may have been injured by  certain vaccines. Persons who believe they may have been injured by a  vaccine can learn about the program and about filing a  claim by calling 3-906.450.5409 or visiting the [employer]  website at www.[facility].gov/vaccinecompensation. There  is a time limit to file a claim for compensation.     7 How can I learn more?  Ask your healthcare provider. He or she can give you  the vaccine package insert or suggest other sources of  information.  Call your local or state health department.  Contact the Centers for Disease Control and  Prevention (CDC):  - Call 3-112.313.6238 (1-800-CDC-INFO) or  - Visit CDCs website at www.cdc.gov/flu  Vaccine Information Statement  Inactivated Influenza Vaccine  42 U. S.C. § 300aa-26  08/07/2015    Personalized Preventive Plan for [facility] - 3/1/2021  Medicare offers a range of preventive health benefits. Some of the tests and screenings are paid in full while other may be subject to a deductible, co-insurance, and/or copay. Some of these benefits include a comprehensive review of your medical history including lifestyle, illnesses that may run in your family, and various assessments and screenings as appropriate. After reviewing your medical record and screening and assessments performed today your provider may have ordered immunizations, labs, imaging, and/or referrals for you. A list of these orders (if applicable) as well as your Preventive Care list are included within your After Visit Summary for your review. Other Preventive Recommendations:    · A preventive eye exam performed by an eye specialist is recommended every 1-2 years to screen for glaucoma; cataracts, macular degeneration, and other eye disorders. · A preventive dental visit is recommended every 6 months. · Try to get at least 150 minutes of exercise per week or 10,000 steps per day on a pedometer . · Order or download the FREE \"Exercise & Physical Activity: Your Everyday Guide\" from [employer]. Call 0-740.233.6328 or search [employer] online. · You need 0034-3096 mg of calcium and 3354-9866 IU of vitamin D per day.  It is possible to meet your calcium requirement with diet alone, but a vitamin D supplement is usually necessary to meet this goal.  · When exposed to the sun, use a sunscreen that protects against both UVA and UVB radiation with an SPF of 30 or greater. Reapply every 2 to 3 hours or after sweating, drying off with a towel, or swimming. · Always wear a seat belt when traveling in a car. Always wear a helmet when riding a bicycle or motorcycle.

## 2021-03-02 VITALS
SYSTOLIC BLOOD PRESSURE: 122 MMHG | DIASTOLIC BLOOD PRESSURE: 70 MMHG | TEMPERATURE: 98.1 F | WEIGHT: 315 LBS | HEART RATE: 69 BPM | OXYGEN SATURATION: 89 % | BODY MASS INDEX: 46.25 KG/M2

## 2021-03-15 ENCOUNTER — OFFICE VISIT (OUTPATIENT)
Dept: FAMILY MEDICINE CLINIC | Age: 56
End: 2021-03-15
Payer: MEDICARE

## 2021-03-15 VITALS
WEIGHT: 315 LBS | SYSTOLIC BLOOD PRESSURE: 136 MMHG | HEART RATE: 97 BPM | BODY MASS INDEX: 46.25 KG/M2 | TEMPERATURE: 98.2 F | DIASTOLIC BLOOD PRESSURE: 80 MMHG | OXYGEN SATURATION: 88 %

## 2021-03-15 DIAGNOSIS — R91.1 PULMONARY NODULE: ICD-10-CM

## 2021-03-15 DIAGNOSIS — Z13.220 LIPID SCREENING: ICD-10-CM

## 2021-03-15 DIAGNOSIS — J44.9 CHRONIC OBSTRUCTIVE PULMONARY DISEASE, UNSPECIFIED COPD TYPE (HCC): ICD-10-CM

## 2021-03-15 DIAGNOSIS — I10 ESSENTIAL HYPERTENSION: Primary | ICD-10-CM

## 2021-03-15 DIAGNOSIS — E66.01 MORBID OBESITY WITH BMI OF 45.0-49.9, ADULT (HCC): ICD-10-CM

## 2021-03-15 DIAGNOSIS — Z13.1 DIABETES MELLITUS SCREENING: ICD-10-CM

## 2021-03-15 DIAGNOSIS — E78.49 OTHER HYPERLIPIDEMIA: ICD-10-CM

## 2021-03-15 PROBLEM — J44.1 CHRONIC OBSTRUCTIVE PULMONARY DISEASE WITH ACUTE EXACERBATION (HCC): Status: RESOLVED | Noted: 2020-01-06 | Resolved: 2021-03-15

## 2021-03-15 PROCEDURE — G8482 FLU IMMUNIZE ORDER/ADMIN: HCPCS | Performed by: INTERNAL MEDICINE

## 2021-03-15 PROCEDURE — 3017F COLORECTAL CA SCREEN DOC REV: CPT | Performed by: INTERNAL MEDICINE

## 2021-03-15 PROCEDURE — 3023F SPIROM DOC REV: CPT | Performed by: INTERNAL MEDICINE

## 2021-03-15 PROCEDURE — G8926 SPIRO NO PERF OR DOC: HCPCS | Performed by: INTERNAL MEDICINE

## 2021-03-15 PROCEDURE — 99214 OFFICE O/P EST MOD 30 MIN: CPT | Performed by: INTERNAL MEDICINE

## 2021-03-15 PROCEDURE — 1036F TOBACCO NON-USER: CPT | Performed by: INTERNAL MEDICINE

## 2021-03-15 PROCEDURE — G8417 CALC BMI ABV UP PARAM F/U: HCPCS | Performed by: INTERNAL MEDICINE

## 2021-03-15 PROCEDURE — G8427 DOCREV CUR MEDS BY ELIG CLIN: HCPCS | Performed by: INTERNAL MEDICINE

## 2021-03-15 RX ORDER — ALBUTEROL SULFATE 90 UG/1
2 AEROSOL, METERED RESPIRATORY (INHALATION) EVERY 6 HOURS PRN
Qty: 1 INHALER | Refills: 3 | Status: SHIPPED | OUTPATIENT
Start: 2021-03-15

## 2021-03-15 RX ORDER — METOPROLOL TARTRATE 50 MG/1
75 TABLET, FILM COATED ORAL 2 TIMES DAILY
Qty: 90 TABLET | Refills: 5 | Status: SHIPPED | OUTPATIENT
Start: 2021-03-15

## 2021-03-15 ASSESSMENT — ENCOUNTER SYMPTOMS
SHORTNESS OF BREATH: 0
WHEEZING: 0
CONSTIPATION: 0
ORTHOPNEA: 0
SORE THROAT: 0
BLOOD IN STOOL: 0
NAUSEA: 0
ABDOMINAL PAIN: 0
COUGH: 0
ALLERGIC/IMMUNOLOGIC NEGATIVE: 1

## 2021-03-15 ASSESSMENT — COPD QUESTIONNAIRES: COPD: 1

## 2021-03-15 NOTE — PATIENT INSTRUCTIONS
SURVEY:    You may be receiving a survey from [employer] regarding your visit today. Please complete the survey to enable us to provide the highest quality of care to you and your family. If you cannot score us a very good on any question, please call the office to discuss how we could of made your experience a very good one. Thank you. You may be receiving a survey from [employer] regarding your visit today. You may get this in the mail, through your MyChart or in your email. Please complete the survey to enable us to provide the highest quality of care to you and your family. If you cannot score us as very good ( 5 Stars) on any question, please feel free to call the office to discuss how we could have made your experience exceptional.     Thank You!       MD Galdino Ramsey RMA Lynnetta Dec, PCA

## 2021-03-15 NOTE — PROGRESS NOTES
HPI Notes    Name: Kapil Han  : 1965         Chief Complaint:     Chief Complaint   Patient presents with    Hypertension     Patient presents today for a check up    COPD    Other     Patient would like a letter to get handicap placard       History of Present Illness:        Mr. Herminia Abarca presents to office to follow up for COPD, HTN, Hyperlipidemia    Smokes less than 1 ppd, down from 1.5 ppd. States overall doing ok. Has no complaints     Noted to have O2 sats 88 %. States becomes SOB with activities. Does not feel SOB at rest. He spends most of his day inva wheelchair. S/PLeft AKA in the past     Has a h/o pulmonary nodule per CT chest from 2020. Did not follow up with ordered repeat CT chest stating due to Covid pandemic. Was ordered PFTs by pulmonologist Dr. Shelby Humphreys  but was unable to complete due to Covid pandemic  Also did not follow up with ordered sleep study. Hypertension  This is a chronic problem. The current episode started more than 1 year ago. The problem is unchanged. The problem is controlled. Pertinent negatives include no chest pain, headaches, orthopnea, palpitations, peripheral edema or shortness of breath. There are no associated agents to hypertension. Risk factors for coronary artery disease include smoking/tobacco exposure, male gender, obesity and sedentary lifestyle. Past treatments include ACE inhibitors, diuretics and beta blockers. The current treatment provides significant improvement. There are no compliance problems. There is no history of CAD/MI, CVA or heart failure. COPD  There is no cough, shortness of breath or wheezing. This is a chronic problem. The current episode started more than 1 year ago. The problem occurs intermittently. Pertinent negatives include no appetite change, chest pain, ear pain, headaches or sore throat. His symptoms are aggravated by change in weather and URI.  His symptoms are alleviated by beta-agonist, steroid inhaler and ipratropium. He reports significant improvement on treatment. Risk factors for lung disease include smoking/tobacco exposure. His past medical history is significant for COPD. Hyperlipidemia  This is a chronic problem. The current episode started more than 1 year ago. Exacerbating diseases include obesity. Factors aggravating his hyperlipidemia include smoking. Pertinent negatives include no chest pain or shortness of breath. Current antihyperlipidemic treatment includes fibric acid derivatives. There are no compliance problems. Past Medical History:     Past Medical History:   Diagnosis Date    Addiction to drug [facility])     CAD (coronary artery disease)     COPD [facility]ch[facility] obstructive pulmonary disease) ([facility] [address]     Hypertension     Kidney failure, acute (HCC)     due to mrsa, reversed    MRSA (methicillin resistant Staphylococcus aureus)     Patient requiring acute dialysis [facility]) July 2010    D/T organ failure from MRSA      Reviewed all health maintenance requirements and orderedappropriate tests  Health Maintenance Due   Topic Date Due    COVID-19 Vaccine (1) Never done    Hepatitis B vaccine (1 of 3 - Risk 3-dose series) Never done    Lipid screen  Never done    Diabetes screen  Never done    Colon cancer screen colonoscopy  Never done       Past Surgical History:     Past Surgical History:   Procedure Laterality Date    ABOVE KNEE AMPUTATION Left     ANKLE SURGERY      right; s/p crush injury    CARDIAC SURGERY      valve replacement    JOINT REPLACEMENT  knee replacement, removed, MRSA    KNEE SURGERY      several on lt    LUNG SURGERY          Medications:       Prior to Admission medications    Medication Sig Start Date End Date Taking?  Authorizing Provider   metoprolol tartrate (LOPRESSOR) 50 MG tablet Take 1.5 tablets by mouth 2 times daily 3/15/21  Yes Nichelle Hess MD   albuterol sulfate HFA (VENTOLIN HFA) 108 (90 Base) MCG/ACT inhaler Inhale 2 puffs into the lungs every 6 hours as needed for Wheezing 3/15/21  Yes Lennox Chaves MD   furosemide (LASIX) 20 MG tablet Take 1 tablet by mouth every other day 1/27/21 3/15/21 Yes Lennox Chaves MD   potassium chloride (KLOR-CON M) 10 MEQ extended release tablet Take 1 tablet by mouth every other day 1/27/21  Yes Lennox Chaves MD   lisinopril (PRINIVIL;ZESTRIL) 5 MG tablet Take 1 tablet by mouth daily 1/27/21  Yes Lennox Chaves MD   fenofibrate (TRIGLIDE) 160 MG tablet TAKE 1 TABLET BY MOUTH EVERY DAY 1/27/21  Yes Lennox Chaves MD   triamterene-hydroCHLOROthiazide (MAXZIDE-25) 37.5-25 MG per tablet TAKE 1 TABLET BY MOUTH EVERY DAY 1/27/21  Yes Lennox Chaves MD   buprenorphine-naloxone (SUBOXONE) 8-2 MG SUBL SL tablet DISSOLVE 1 (ONE) UNDER THE TONGUE EVERY 12 HOURS 1/10/20  Yes Tim Rivera MD   Fluticasone furoate-vilanterol (BREO ELLIPTA) 200-25 MCG/INH AEPB inhaler Inhale 1 puff into the lungs daily  Patient not taking: Reported on 3/1/2021 4/23/20   ANTONIA Granger - CNP   albuterol (PROVENTIL) (2.5 MG/3ML) 0.083% nebulizer solution Take 3 mLs by nebulization every 4 hours as needed for Wheezing or Shortness of Breath  Patient not taking: Reported on 3/15/2021 1/27/20   Sarah Glynn MD        Allergies:       Patient has no known allergies. Social History:     Tobacco: reports that he quit smoking about 11 months ago. His smoking use included cigarettes. He smoked 1.00 pack per day. He has never used smokeless tobacco.  Alcohol:      reports no history of alcohol use. Drug Use:  reports previous drug use. Family History:     Family History   Problem Relation Age of Onset    Cancer Mother        Review of Systems:         Review of Systems   Constitutional: Negative for activity change, appetite change and unexpected weight change. HENT: Negative for congestion, ear discharge, ear pain and sore throat. Eyes: Negative for visual disturbance.    Respiratory: Negative for cough, shortness of breath and wheezing. Cardiovascular: Negative for chest pain, palpitations, orthopnea and leg swelling. Gastrointestinal: Negative for abdominal pain, blood in stool, constipation and nausea. Endocrine: Negative for cold intolerance, heat intolerance, polydipsia and polyuria. Genitourinary: Negative for difficulty urinating and dysuria. Musculoskeletal: Negative. Skin: Negative for rash. Allergic/Immunologic: Negative. Neurological: Negative for weakness and headaches. Psychiatric/Behavioral: Negative for behavioral problems and dysphoric mood. The patient is not nervous/anxious. Physical Exam:     Vitals:  /80 (Site: Right Lower Arm, Position: Sitting, Cuff Size: Medium Adult)   Pulse 97   Temp 98.2 °F (36.8 °C)   Wt (!) 370 lb (167.8 kg)   SpO2 (!) 88%   BMI 46.25 kg/m²       Physical Exam  Vitals signs reviewed. Constitutional:       General: He is not in acute distress. Appearance: He is well-developed. HENT:      Head: Normocephalic and atraumatic. Neck:      Thyroid: No thyromegaly. Cardiovascular:      Rate and Rhythm: Normal rate and regular rhythm. Heart sounds: Normal heart sounds. No murmur. Pulmonary:      Effort: Pulmonary effort is normal.      Breath sounds: No wheezing or rales. Comments: diminished breath sounds  Abdominal:      General: Bowel sounds are normal. There is no distension. Palpations: Abdomen is soft. There is no mass. Tenderness: There is no abdominal tenderness. Musculoskeletal: Normal range of motion. Comments: Left AKA   Lymphadenopathy:      Cervical: No cervical adenopathy. Skin:     General: Skin is warm and dry. Findings: No rash. Neurological:      Mental Status: He is alert and oriented to person, place, and time.    Psychiatric:         Behavior: Behavior normal.         Judgment: Judgment normal.               Data:     Lab Results   Component Value Date     04/24/2020    K 3.6 04/24/2020    CL 99 04/24/2020    CO2 29 04/24/2020    BUN 16 04/24/2020    CREATININE 0.66 04/24/2020    GLUCOSE 133 04/24/2020    PROT 9.1 01/06/2020    LABALBU 4.1 01/06/2020    BILITOT 0.57 01/06/2020    ALKPHOS 107 01/06/2020    AST 48 01/06/2020    ALT 63 01/06/2020     Lab Results   Component Value Date    WBC 8.8 04/24/2020    RBC 5.68 04/24/2020    HGB 14.6 04/24/2020    HCT 45.3 04/24/2020    MCV 79.8 04/24/2020    MCH 25.8 04/24/2020    MCHC 32.3 04/24/2020    RDW 15.8 04/24/2020     04/24/2020    MPV NOT REPORTED 04/24/2020     No results found for: TSH  No results found for: CHOL, HDL, PSA, LABA1C       Assessment & Plan        Diagnosis Orders   1. Essential hypertension   BP well controlled, continue on current meds, check [facility] Basic Metabolic Panel    metoprolol tartrate (LOPRESSOR) 50 MG tablet   2. Chronic obstructive pulmonary disease, unspecified COPD type ([facility] [address]   Will check PFTs, continue on albuterol nebs, Breo Ellipta. Strongly advised to quit smoking. His O2 sats are 88 % RA. Discussed possible home O2. We decided to get PFTs and reevaluate him in 4 weeks. albuterol sulfate HFA (VENTOLIN HFA) 108 (90 Base) MCG/ACT inhaler    Full PFT Study With Bronchodilator   3. Other hyperlipidemia   checl Lipid panel, continue on Fenofibrate Lipid Panel   4. Pulmonary nodule   Reordered CT chest.  CT CHEST WO CONTRAST   5. Diabetes mellitus screening   Check [facility] Basic Metabolic Panel   6. Lipid screening  Lipid Panel   7.  Morbid obesity with BMI of 45.0-49.9, adult (HCC)                     Completed Refills   Requested Prescriptions     Signed Prescriptions Disp Refills    metoprolol tartrate (LOPRESSOR) 50 MG tablet 90 tablet 5     Sig: Take 1.5 tablets by mouth 2 times daily    albuterol sulfate HFA (VENTOLIN HFA) 108 (90 Base) MCG/ACT inhaler 1 Inhaler 3     Sig: Inhale 2 puffs into the lungs every 6 hours as needed for Wheezing     Return in about 4 weeks (around 4/12/2021), or pulmonary nodule and hypoxia. Orders Placed This Encounter   Medications    metoprolol tartrate (LOPRESSOR) 50 MG tablet     Sig: Take 1.5 tablets by mouth 2 times daily     Dispense:  90 tablet     Refill:  5    albuterol sulfate HFA (VENTOLIN HFA) 108 (90 Base) MCG/ACT inhaler     Sig: Inhale 2 puffs into the lungs every 6 hours as needed for Wheezing     Dispense:  1 Inhaler     Refill:  3     Orders Placed This Encounter   Procedures    CT CHEST WO CONTRAST     Standing Status:   Future     Standing Expiration Date:   3/15/2022    Basic Metabolic Panel     Standing Status:   Future     Standing Expiration Date:   3/15/2022    Lipid Panel     Standing Status:   Future     Standing Expiration Date:   3/15/2022     Order Specific Question:   Is Patient Fasting?/# of Hours     Answer:   8 hrs    Full PFT Study With Bronchodilator     Standing Status:   Future     Standing Expiration Date:   3/15/2022         Patient Instructions   SURVEY:    You may be receiving a survey from [email or web address] regarding your visit today. Please complete the survey to enable us to provide the highest quality of care to you and your family. If you cannot score us a very good on any question, please call the office to discuss how we could of made your experience a very good one. Thank you. You may be receiving a survey from [email or web address] regarding your visit today. You may get this in the mail, through your MyChart or in your email. Please complete the survey to enable us to provide the highest quality of care to you and your family. If you cannot score us as very good ( 5 Stars) on any question, please feel free to call the office to discuss how we could have made your experience exceptional.     Thank You!       MD Niecy Aleman [facility], [address] [place] [address]        Electronically signed by Deisy Love MD on 3/15/2021 at 9:33 PM           Completed Refills      Requested Prescriptions     Signed Prescriptions Disp Refills    metoprolol tartrate (LOPRESSOR) 50 MG tablet 90 tablet 5     Sig: Take 1.5 tablets by mouth 2 times daily    albuterol sulfate HFA (VENTOLIN HFA) 108 (90 Base) MCG/ACT inhaler 1 Inhaler 3     Sig: Inhale 2 puffs into the lungs every 6 hours as needed for Wheezing

## 2021-04-01 ENCOUNTER — HOSPITAL ENCOUNTER (OUTPATIENT)
Age: 56
Setting detail: SPECIMEN
Discharge: HOME | End: 2021-04-01
Payer: MEDICARE

## 2021-04-05 ENCOUNTER — APPOINTMENT (OUTPATIENT)
Dept: GENERAL RADIOLOGY | Age: 56
DRG: 871 | End: 2021-04-05
Payer: MEDICARE

## 2021-04-05 ENCOUNTER — HOSPITAL ENCOUNTER (INPATIENT)
Age: 56
LOS: 3 days | Discharge: NURSING, REHAB OR LONG-TERM CARE | DRG: 871 | End: 2021-04-08
Attending: EMERGENCY MEDICINE | Admitting: INTERNAL MEDICINE
Payer: MEDICARE

## 2021-04-05 DIAGNOSIS — R77.8 ELEVATED TROPONIN: ICD-10-CM

## 2021-04-05 DIAGNOSIS — L03.115 CELLULITIS OF RIGHT LEG: Primary | ICD-10-CM

## 2021-04-05 LAB
ABSOLUTE BANDS #: 3.64 K/UL (ref 0–1)
ABSOLUTE EOS #: ABNORMAL K/UL (ref 0–0.4)
ABSOLUTE IMMATURE GRANULOCYTE: ABNORMAL K/UL (ref 0–0.3)
ABSOLUTE LYMPH #: 1.46 K/UL (ref 1–4.8)
ABSOLUTE MONO #: 1.64 K/UL (ref 0–1)
ALBUMIN SERPL-MCNC: 3.2 G/DL (ref 3.5–5.2)
ALBUMIN/GLOBULIN RATIO: ABNORMAL (ref 1–2.5)
ALP BLD-CCNC: 90 U/L (ref 40–129)
ALT SERPL-CCNC: 28 U/L (ref 5–41)
ANION GAP SERPL CALCULATED.3IONS-SCNC: 9 MMOL/L (ref 9–17)
AST SERPL-CCNC: 32 U/L
BANDS: 20 % (ref 0–10)
BASOPHILS # BLD: ABNORMAL % (ref 0–2)
BASOPHILS ABSOLUTE: ABNORMAL K/UL (ref 0–0.2)
BILIRUB SERPL-MCNC: 1.65 MG/DL (ref 0.3–1.2)
BUN BLDV-MCNC: 33 MG/DL (ref 6–20)
BUN/CREAT BLD: 37 (ref 9–20)
CALCIUM SERPL-MCNC: 9.1 MG/DL (ref 8.6–10.4)
CHLORIDE BLD-SCNC: 88 MMOL/L (ref 98–107)
CO2: 32 MMOL/L (ref 20–31)
CREAT SERPL-MCNC: 0.89 MG/DL (ref 0.7–1.2)
DIFFERENTIAL TYPE: ABNORMAL
EKG ATRIAL RATE: 102 BPM
EKG P AXIS: 32 DEGREES
EKG P-R INTERVAL: 178 MS
EKG Q-T INTERVAL: 350 MS
EKG QRS DURATION: 110 MS
EKG QTC CALCULATION (BAZETT): 456 MS
EKG R AXIS: 37 DEGREES
EKG T AXIS: 50 DEGREES
EKG VENTRICULAR RATE: 102 BPM
EOSINOPHILS RELATIVE PERCENT: ABNORMAL % (ref 0–5)
GFR AFRICAN AMERICAN: >60 ML/MIN
GFR NON-AFRICAN AMERICAN: >60 ML/MIN
GFR SERPL CREATININE-BSD FRML MDRD: ABNORMAL ML/MIN/{1.73_M2}
GFR SERPL CREATININE-BSD FRML MDRD: ABNORMAL ML/MIN/{1.73_M2}
GLUCOSE BLD-MCNC: 113 MG/DL (ref 70–99)
HCT VFR BLD CALC: 49.2 % (ref 41–53)
HEMOGLOBIN: 16.3 G/DL (ref 13.5–17.5)
IMMATURE GRANULOCYTES: ABNORMAL %
LACTIC ACID: 1.6 MMOL/L (ref 0.5–2.2)
LYMPHOCYTES # BLD: 8 % (ref 13–44)
MCH RBC QN AUTO: 25.8 PG (ref 26–34)
MCHC RBC AUTO-ENTMCNC: 33 G/DL (ref 31–37)
MCV RBC AUTO: 78.1 FL (ref 80–100)
MONOCYTES # BLD: 9 % (ref 5–9)
MORPHOLOGY: ABNORMAL
NRBC AUTOMATED: ABNORMAL PER 100 WBC
PDW BLD-RTO: 16.7 % (ref 12.1–15.2)
PLATELET # BLD: 216 K/UL (ref 140–450)
PLATELET ESTIMATE: ABNORMAL
PMV BLD AUTO: ABNORMAL FL (ref 6–12)
POTASSIUM SERPL-SCNC: 3.8 MMOL/L (ref 3.7–5.3)
RBC # BLD: 6.3 M/UL (ref 4.5–5.9)
RBC # BLD: ABNORMAL 10*6/UL
SARS-COV-2, RAPID: NOT DETECTED
SEG NEUTROPHILS: 63 % (ref 39–75)
SEGMENTED NEUTROPHILS ABSOLUTE COUNT: 11.46 K/UL (ref 2.1–6.5)
SODIUM BLD-SCNC: 129 MMOL/L (ref 135–144)
SPECIMEN DESCRIPTION: NORMAL
TOTAL PROTEIN: 8 G/DL (ref 6.4–8.3)
TROPONIN INTERP: ABNORMAL
TROPONIN T: ABNORMAL NG/ML
TROPONIN, HIGH SENSITIVITY: 60 NG/L (ref 0–22)
TROPONIN, HIGH SENSITIVITY: 64 NG/L (ref 0–22)
TROPONIN, HIGH SENSITIVITY: 65 NG/L (ref 0–22)
TROPONIN, HIGH SENSITIVITY: 77 NG/L (ref 0–22)
WBC # BLD: 18.2 K/UL (ref 3.5–11)
WBC # BLD: ABNORMAL 10*3/UL

## 2021-04-05 PROCEDURE — 99285 EMERGENCY DEPT VISIT HI MDM: CPT

## 2021-04-05 PROCEDURE — 87040 BLOOD CULTURE FOR BACTERIA: CPT

## 2021-04-05 PROCEDURE — 6370000000 HC RX 637 (ALT 250 FOR IP): Performed by: INTERNAL MEDICINE

## 2021-04-05 PROCEDURE — 93010 ELECTROCARDIOGRAM REPORT: CPT | Performed by: INTERNAL MEDICINE

## 2021-04-05 PROCEDURE — 2580000003 HC RX 258: Performed by: EMERGENCY MEDICINE

## 2021-04-05 PROCEDURE — 93005 ELECTROCARDIOGRAM TRACING: CPT | Performed by: EMERGENCY MEDICINE

## 2021-04-05 PROCEDURE — 84484 ASSAY OF TROPONIN QUANT: CPT

## 2021-04-05 PROCEDURE — 96367 TX/PROPH/DG ADDL SEQ IV INF: CPT

## 2021-04-05 PROCEDURE — 2700000000 HC OXYGEN THERAPY PER DAY

## 2021-04-05 PROCEDURE — 96375 TX/PRO/DX INJ NEW DRUG ADDON: CPT

## 2021-04-05 PROCEDURE — 87635 SARS-COV-2 COVID-19 AMP PRB: CPT

## 2021-04-05 PROCEDURE — 6360000002 HC RX W HCPCS: Performed by: INTERNAL MEDICINE

## 2021-04-05 PROCEDURE — 85025 COMPLETE CBC W/AUTO DIFF WBC: CPT

## 2021-04-05 PROCEDURE — 1200000000 HC SEMI PRIVATE

## 2021-04-05 PROCEDURE — 83605 ASSAY OF LACTIC ACID: CPT

## 2021-04-05 PROCEDURE — 2580000003 HC RX 258: Performed by: INTERNAL MEDICINE

## 2021-04-05 PROCEDURE — 96365 THER/PROPH/DIAG IV INF INIT: CPT

## 2021-04-05 PROCEDURE — 6360000002 HC RX W HCPCS: Performed by: EMERGENCY MEDICINE

## 2021-04-05 PROCEDURE — 36415 COLL VENOUS BLD VENIPUNCTURE: CPT

## 2021-04-05 PROCEDURE — 94664 DEMO&/EVAL PT USE INHALER: CPT

## 2021-04-05 PROCEDURE — 94761 N-INVAS EAR/PLS OXIMETRY MLT: CPT

## 2021-04-05 PROCEDURE — 71045 X-RAY EXAM CHEST 1 VIEW: CPT

## 2021-04-05 PROCEDURE — 80053 COMPREHEN METABOLIC PANEL: CPT

## 2021-04-05 RX ORDER — FENOFIBRATE 160 MG/1
160 TABLET ORAL DAILY
Status: DISCONTINUED | OUTPATIENT
Start: 2021-04-06 | End: 2021-04-08 | Stop reason: HOSPADM

## 2021-04-05 RX ORDER — SODIUM CHLORIDE 9 MG/ML
25 INJECTION, SOLUTION INTRAVENOUS PRN
Status: DISCONTINUED | OUTPATIENT
Start: 2021-04-05 | End: 2021-04-08 | Stop reason: HOSPADM

## 2021-04-05 RX ORDER — SODIUM CHLORIDE, SODIUM LACTATE, POTASSIUM CHLORIDE, CALCIUM CHLORIDE 600; 310; 30; 20 MG/100ML; MG/100ML; MG/100ML; MG/100ML
30 INJECTION, SOLUTION INTRAVENOUS ONCE
Status: DISCONTINUED | OUTPATIENT
Start: 2021-04-05 | End: 2021-04-08 | Stop reason: HOSPADM

## 2021-04-05 RX ORDER — SODIUM CHLORIDE 0.9 % (FLUSH) 0.9 %
10 SYRINGE (ML) INJECTION PRN
Status: DISCONTINUED | OUTPATIENT
Start: 2021-04-05 | End: 2021-04-08 | Stop reason: HOSPADM

## 2021-04-05 RX ORDER — ACETAMINOPHEN 325 MG/1
650 TABLET ORAL EVERY 6 HOURS PRN
Status: DISCONTINUED | OUTPATIENT
Start: 2021-04-05 | End: 2021-04-08 | Stop reason: HOSPADM

## 2021-04-05 RX ORDER — FUROSEMIDE 20 MG/1
20 TABLET ORAL EVERY OTHER DAY
Status: DISCONTINUED | OUTPATIENT
Start: 2021-04-07 | End: 2021-04-08 | Stop reason: HOSPADM

## 2021-04-05 RX ORDER — ALBUTEROL SULFATE 2.5 MG/3ML
2.5 SOLUTION RESPIRATORY (INHALATION) EVERY 4 HOURS PRN
Status: DISCONTINUED | OUTPATIENT
Start: 2021-04-05 | End: 2021-04-08 | Stop reason: HOSPADM

## 2021-04-05 RX ORDER — KETOROLAC TROMETHAMINE 30 MG/ML
30 INJECTION, SOLUTION INTRAMUSCULAR; INTRAVENOUS EVERY 6 HOURS PRN
Status: DISCONTINUED | OUTPATIENT
Start: 2021-04-05 | End: 2021-04-06

## 2021-04-05 RX ORDER — ACETAMINOPHEN 650 MG/1
650 SUPPOSITORY RECTAL EVERY 6 HOURS PRN
Status: DISCONTINUED | OUTPATIENT
Start: 2021-04-05 | End: 2021-04-08 | Stop reason: HOSPADM

## 2021-04-05 RX ORDER — VANCOMYCIN HYDROCHLORIDE 750 MG/15ML
INJECTION, POWDER, LYOPHILIZED, FOR SOLUTION INTRAVENOUS
Status: DISPENSED
Start: 2021-04-05 | End: 2021-04-06

## 2021-04-05 RX ORDER — LISINOPRIL 5 MG/1
5 TABLET ORAL DAILY
Status: DISCONTINUED | OUTPATIENT
Start: 2021-04-06 | End: 2021-04-05

## 2021-04-05 RX ORDER — SODIUM CHLORIDE 0.9 % (FLUSH) 0.9 %
10 SYRINGE (ML) INJECTION PRN
Status: DISCONTINUED | OUTPATIENT
Start: 2021-04-05 | End: 2021-04-07 | Stop reason: SDUPTHER

## 2021-04-05 RX ORDER — SODIUM CHLORIDE 9 MG/ML
INJECTION, SOLUTION INTRAVENOUS CONTINUOUS
Status: DISCONTINUED | OUTPATIENT
Start: 2021-04-05 | End: 2021-04-07

## 2021-04-05 RX ORDER — BUPRENORPHINE HYDROCHLORIDE AND NALOXONE HYDROCHLORIDE DIHYDRATE 8; 2 MG/1; MG/1
1 TABLET SUBLINGUAL EVERY 12 HOURS
Status: DISCONTINUED | OUTPATIENT
Start: 2021-04-05 | End: 2021-04-08 | Stop reason: HOSPADM

## 2021-04-05 RX ORDER — ONDANSETRON 2 MG/ML
4 INJECTION INTRAMUSCULAR; INTRAVENOUS EVERY 6 HOURS PRN
Status: DISCONTINUED | OUTPATIENT
Start: 2021-04-05 | End: 2021-04-08 | Stop reason: HOSPADM

## 2021-04-05 RX ORDER — PROMETHAZINE HYDROCHLORIDE 25 MG/1
12.5 TABLET ORAL EVERY 6 HOURS PRN
Status: DISCONTINUED | OUTPATIENT
Start: 2021-04-05 | End: 2021-04-08 | Stop reason: HOSPADM

## 2021-04-05 RX ORDER — ASPIRIN 81 MG/1
81 TABLET, CHEWABLE ORAL DAILY
Status: DISCONTINUED | OUTPATIENT
Start: 2021-04-05 | End: 2021-04-05

## 2021-04-05 RX ORDER — KETOROLAC TROMETHAMINE 30 MG/ML
30 INJECTION, SOLUTION INTRAMUSCULAR; INTRAVENOUS ONCE
Status: COMPLETED | OUTPATIENT
Start: 2021-04-05 | End: 2021-04-05

## 2021-04-05 RX ORDER — 0.9 % SODIUM CHLORIDE 0.9 %
1000 INTRAVENOUS SOLUTION INTRAVENOUS ONCE
Status: COMPLETED | OUTPATIENT
Start: 2021-04-05 | End: 2021-04-05

## 2021-04-05 RX ORDER — SODIUM CHLORIDE 0.9 % (FLUSH) 0.9 %
10 SYRINGE (ML) INJECTION EVERY 12 HOURS SCHEDULED
Status: DISCONTINUED | OUTPATIENT
Start: 2021-04-05 | End: 2021-04-08 | Stop reason: HOSPADM

## 2021-04-05 RX ORDER — POLYETHYLENE GLYCOL 3350 17 G/17G
17 POWDER, FOR SOLUTION ORAL DAILY PRN
Status: DISCONTINUED | OUTPATIENT
Start: 2021-04-05 | End: 2021-04-08 | Stop reason: HOSPADM

## 2021-04-05 RX ORDER — ASPIRIN 81 MG/1
81 TABLET ORAL DAILY
Status: DISCONTINUED | OUTPATIENT
Start: 2021-04-05 | End: 2021-04-08 | Stop reason: HOSPADM

## 2021-04-05 RX ORDER — POTASSIUM CHLORIDE 750 MG/1
10 TABLET, FILM COATED, EXTENDED RELEASE ORAL EVERY OTHER DAY
Status: DISCONTINUED | OUTPATIENT
Start: 2021-04-07 | End: 2021-04-08 | Stop reason: HOSPADM

## 2021-04-05 RX ORDER — SODIUM CHLORIDE 0.9 % (FLUSH) 0.9 %
10 SYRINGE (ML) INJECTION EVERY 12 HOURS SCHEDULED
Status: DISCONTINUED | OUTPATIENT
Start: 2021-04-05 | End: 2021-04-07 | Stop reason: SDUPTHER

## 2021-04-05 RX ORDER — NICOTINE 21 MG/24HR
1 PATCH, TRANSDERMAL 24 HOURS TRANSDERMAL DAILY
Status: DISCONTINUED | OUTPATIENT
Start: 2021-04-05 | End: 2021-04-08 | Stop reason: HOSPADM

## 2021-04-05 RX ADMIN — KETOROLAC TROMETHAMINE 30 MG: 30 INJECTION, SOLUTION INTRAMUSCULAR at 14:23

## 2021-04-05 RX ADMIN — ASPIRIN 81 MG: 81 TABLET, COATED ORAL at 19:00

## 2021-04-05 RX ADMIN — VANCOMYCIN HYDROCHLORIDE 1750 MG: 750 INJECTION, POWDER, LYOPHILIZED, FOR SOLUTION INTRAVENOUS at 21:51

## 2021-04-05 RX ADMIN — VANCOMYCIN HYDROCHLORIDE 1000 MG: 1 INJECTION, POWDER, LYOPHILIZED, FOR SOLUTION INTRAVENOUS at 12:41

## 2021-04-05 RX ADMIN — SODIUM CHLORIDE: 9 INJECTION, SOLUTION INTRAVENOUS at 21:51

## 2021-04-05 RX ADMIN — SODIUM CHLORIDE, PRESERVATIVE FREE 10 ML: 5 INJECTION INTRAVENOUS at 21:52

## 2021-04-05 RX ADMIN — PIPERACILLIN SODIUM AND TAZOBACTAM SODIUM 3375 MG: 3; .375 INJECTION, POWDER, LYOPHILIZED, FOR SOLUTION INTRAVENOUS at 14:23

## 2021-04-05 RX ADMIN — SODIUM CHLORIDE 1000 ML: 9 INJECTION, SOLUTION INTRAVENOUS at 12:41

## 2021-04-05 RX ADMIN — SODIUM CHLORIDE, PRESERVATIVE FREE 10 ML: 5 INJECTION INTRAVENOUS at 19:12

## 2021-04-05 RX ADMIN — ENOXAPARIN SODIUM 40 MG: 40 INJECTION SUBCUTANEOUS at 16:59

## 2021-04-05 RX ADMIN — PIPERACILLIN SODIUM AND TAZOBACTAM SODIUM 3375 MG: 3; .375 INJECTION, POWDER, LYOPHILIZED, FOR SOLUTION INTRAVENOUS at 19:11

## 2021-04-05 RX ADMIN — ALBUTEROL SULFATE 2.5 MG: 2.5 SOLUTION RESPIRATORY (INHALATION) at 21:58

## 2021-04-05 ASSESSMENT — PAIN DESCRIPTION - PAIN TYPE
TYPE: ACUTE PAIN
TYPE: ACUTE PAIN

## 2021-04-05 ASSESSMENT — PAIN SCALES - GENERAL
PAINLEVEL_OUTOF10: 0
PAINLEVEL_OUTOF10: 0

## 2021-04-05 ASSESSMENT — PAIN DESCRIPTION - DESCRIPTORS
DESCRIPTORS: BURNING
DESCRIPTORS: ACHING

## 2021-04-05 ASSESSMENT — PAIN DESCRIPTION - FREQUENCY: FREQUENCY: CONTINUOUS

## 2021-04-05 ASSESSMENT — PAIN DESCRIPTION - ORIENTATION: ORIENTATION: RIGHT

## 2021-04-05 ASSESSMENT — PAIN DESCRIPTION - LOCATION: LOCATION: LEG

## 2021-04-05 NOTE — ED PROVIDER NOTES
[place] [address] COMPLAINT    Chief Complaint   Patient presents with    Leg Pain     pt presents to the ED with c/o right leg pain - states severe pain/swelling started yesterday - states his leg always has some degree of redness/swelling but not as bad as it is today       HPI    Milvia Xiong is a 48 y.o. male who presentsto ED from home. By EMS. With complaint of R leg. Onset x 2 days. Intensity of symptoms moderate pain in the right leg  Location of symptoms right leg. 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 Patient has history of COPD.      PAST MEDICAL HISTORY    Past Medical History:   Diagnosis Date    Addiction to drug [facility])     CAD (coronary artery disease)     COPD [facility]chronic obstructive pulmonary disease) [facility]     Hypertension     Kidney failure, acute (HCC)     due to mrsa, reversed    MRSA (methicillin resistant Staphylococcus aureus)     Patient requiring acute dialysis [facility]) July 2010    D/T organ failure from MRSA       SURGICAL HISTORY    Past Surgical History:   Procedure Laterality Date    ABOVE KNEE AMPUTATION Left     ANKLE SURGERY      right; s/p crush injury    CARDIAC SURGERY      valve replacement    JOINT REPLACEMENT  knee replacement, removed, MRSA    KNEE SURGERY      several on lt    LUNG SURGERY         CURRENT MEDICATIONS    Current Outpatient Rx   Medication Sig Dispense Refill    albuterol sulfate HFA (VENTOLIN HFA) 108 (90 Base) MCG/ACT inhaler Inhale 2 puffs into the lungs every 6 hours as needed for Wheezing 1 Inhaler 3    furosemide (LASIX) 20 MG tablet Take 1 tablet by mouth every other day 15 tablet 5    potassium chloride (KLOR-CON M) 10 MEQ extended release tablet Take 1 tablet by mouth every other day 15 tablet 5    lisinopril (PRINIVIL;ZESTRIL) 5 MG tablet Take 1 tablet by mouth daily 30 tablet 3    fenofibrate (TRIGLIDE) 160 MG tablet TAKE 1 TABLET BY MOUTH EVERY DAY 90 tablet 1    triamterene-hydroCHLOROthiazide (MAXZIDE-25) 37.5-25 MG per tablet TAKE 1 TABLET BY MOUTH EVERY DAY 90 tablet 1    albuterol (PROVENTIL) (2.5 MG/3ML) 0.083% nebulizer solution Take 3 mLs by nebulization every 4 hours as needed for Wheezing or Shortness of Breath 120 each 11    buprenorphine-naloxone (SUBOXONE) 8-2 MG SUBL SL tablet DISSOLVE 1 (ONE) UNDER THE TONGUE EVERY 12 HOURS      metoprolol tartrate (LOPRESSOR) 50 MG tablet Take 1.5 tablets by mouth 2 times daily 90 tablet 5       ALLERGIES    No Known Allergies    FAMILY HISTORY    Family History   Problem Relation Age of Onset    Cancer Mother        SOCIAL HISTORY    Social History     Socioeconomic History    Marital status: Legally      Spouse name: None    Number of children: None    Years of education: None    Highest education level: None   Occupational History    None   Social Needs    Financial resource strain: Not hard at all   [employer] insecurity     Worry: Never true     Inability: Never true    Transportation needs     Medical: No     Non-medical: No   Tobacco Use    Smoking status: Former Smoker     Packs/day: 0.75     Types: Cigarettes     Quit date: 2020     Years since quittin.9    Smokeless tobacco: Never Used   Substance and Sexual Activity    Alcohol use: No    Drug use: Not Currently     Comment: heroin    Sexual activity: None   Lifestyle    Physical activity     Days per week: None     Minutes per session: None    Stress: None   Relationships    Social connections     Talks on phone: None     Gets together: None     Attends Taoism service: None     Active member of club or organization: None     Attends meetings of clubs or organizations: None     Relationship status: None    Intimate partner violence     Fear of current or ex partner: None     Emotionally abused: None     Physically abused: None     Forced sexual activity: None   Other Topics Concern    None   Social History Narrative  None           Review of Systems:  Constitutional:  Denies fever, chills, weight loss or weakness   Eyes:  Denies photophobia or discharge   HENT:  Denies sore throat or ear pain   Respiratory:  Denies cough or shortness of breath   Cardiovascular:  Denies chest pain, palpitations or swelling   GI:  Denies abdominal pain, nausea, vomiting, or diarrhea   Musculoskeletal: Positive for left above-the-knee amputation positive for right lower extremity edema and redness   skin:  Denies rash   Neurologic:  Denies headache, focal weakness or sensory changes   Endocrine:  Denies polyuria or polydypsia   Lymphatic:  Denies swollen glands   Psychiatric:  Denies depression, suicidal ideation or homicidal ideation   All systems negative except as marked. PHYSICAL EXAM    VITAL SIGNS: /62   Pulse 107   Temp 98.6 °F (37 °C) (Oral)   Resp 24   Ht 6' 1\" (1.854 m)   Wt (!) 352 lb 4.8 oz (159.8 kg)   SpO2 94%   BMI 46.48 kg/m²    Constitutional: Ill-appearing male HENT:  Normocephalic, Atraumatic, Bilateral external ears normal, Oropharynx moist, No oral exudates, Nose normal. Neck- Normal range of motion, No tenderness, Supple, No stridor. Eyes:  PERRL, EOMI, Conjunctiva normal, No discharge. Respiratory:  Normal breath sounds, No respiratory distress, No wheezing, No chest tenderness. Cardiovascular: Regular tachycardic rhythm  GI:  Bowel sounds normal, Soft, No tenderness, No masses, No pulsatile masses. : External genitalia appear normal, No masses or lesions. No discharge. No CVA tenderness. Musculoskeletal: R Lower extremity with redness, worsening edema  Integument:  Warm, Dry, No erythema, No rash. Lymphatic:  No lymphadenopathy noted. Neurologic:  Alert & oriented x 3, Normal motor function, Normal sensory function, No focal deficits noted.    Psychiatric:  Affect normal, Judgment normal, Mood normal.     EKG    Sinus tachycardia rate of 102    RADIOLOGY    XR CHEST PORTABLE   Final Result Pulmonary vascular congestion. While a small amount of infiltrate is not    excluded, the findings are not definitive that the lungs are the source of the    stated sepsis. Findings overall with only slight change from study of    approximately one year prior                      [facility] Reviewed   CBC WITH AUTO DIFFERENTIAL - Abnormal; Notable for the following components:       Result Value    WBC 18.2 (*)     RBC 6.30 (*)     MCV 78.1 (*)     MCH 25.8 (*)     RDW 16.7 (*)     Lymphocytes 8 (*)     Bands 20 (*)     Segs Absolute 11.46 (*)     Absolute Mono # 1.64 (*)     Absolute Bands # 3.64 (*)     All other components within normal limits   COMPREHENSIVE METABOLIC PANEL - Abnormal; Notable for the following components:    Glucose 113 (*)     BUN 33 (*)     Bun/Cre Ratio 37 (*)     Sodium 129 (*)     Chloride 88 (*)     CO2 32 (*)     Total Bilirubin 1.65 (*)     Albumin 3.2 (*)     All other components within normal limits   TROPONIN - Abnormal; Notable for the following components:    Troponin, High Sensitivity 64 (*)     All other components within normal limits   TROPONIN - Abnormal; Notable for the following components:    Troponin, High Sensitivity 60 (*)     All other components within normal limits   COVID-19, RAPID   CULTURE, BLOOD 1   CULTURE, BLOOD 1   CULTURE, BLOOD 2   LACTIC ACID   URINALYSIS             Summation      Patient Course: Patient is treated for cellulitis of the right lower extremity, sepsis. Patient has elevated troponin, 1 hour repeat with no interval change. I discussed the patient with  cardiologist at [place] [address]. Patient will be admitted to [address] [place] [address] with diagnosis of cellulitis.     ED Medications administered this visit:    Medications   lactated ringers infusion 4,794 mL ( Intravenous Canceled Entry 4/5/21 1242)   sodium chloride flush 0.9 % injection 10 mL (has no administration in time range)   sodium chloride flush 0.9 % injection 10 mL (has no administration in time range)   0.9 % sodium chloride infusion (has no administration in time range)   vancomycin 1000 mg IVPB in 250 mL D5W addavial (0 mg Intravenous Stopped 4/5/21 1402)   piperacillin-tazobactam (ZOSYN) 3,375 mg in dextrose 5 % 50 mL IVPB (mini-bag) (3,375 mg Intravenous New Bag 4/5/21 1423)   0.9 % sodium chloride bolus (0 mLs Intravenous Stopped 4/5/21 1343)   ketorolac (TORADOL) injection 30 mg (30 mg Intravenous Given 4/5/21 1423)       New Prescriptions from this visit:    New Prescriptions    No medications on file       Follow-up:  No follow-up provider specified. Final Impression:   1. Cellulitis of right leg    2.  Elevated troponin               (Please note that portions of this note were completed with a voice recognition program.  Efforts were made to edit the dictations but occasionally words are mis-transcribed.)        Ronaldo Lou MD  04/05/21 0907

## 2021-04-05 NOTE — PROGRESS NOTES
Patient came to floor with approximately 500 dollars. Supervisor and 7p-7a RN aware and supervisor to be up to secure money.

## 2021-04-05 NOTE — PLAN OF CARE
Problem: Pain:  Goal: Pain level will decrease  Description: Pain level will decrease  Outcome: Ongoing  Goal: Control of acute pain  Description: Control of acute pain  Outcome: Ongoing  Goal: Patient's pain/discomfort is manageable  Description: Patient's pain/discomfort is manageable  Outcome: Met This Shift     Problem: Falls - Risk of:  Goal: Will remain free from falls  Description: Will remain free from falls  Outcome: Met This Shift     Problem: Falls - Risk of:  Goal: Will remain free from falls  Description: Will remain free from falls  Outcome: Met This Shift

## 2021-04-06 ENCOUNTER — APPOINTMENT (OUTPATIENT)
Dept: CT IMAGING | Age: 56
DRG: 871 | End: 2021-04-06
Payer: MEDICARE

## 2021-04-06 LAB
-: ABNORMAL
ABSOLUTE EOS #: 0.1 K/UL (ref 0–0.4)
ABSOLUTE IMMATURE GRANULOCYTE: ABNORMAL K/UL (ref 0–0.3)
ABSOLUTE LYMPH #: 0.7 K/UL (ref 1–4.8)
ABSOLUTE MONO #: 1.2 K/UL (ref 0–1)
AMORPHOUS: ABNORMAL
ANION GAP SERPL CALCULATED.3IONS-SCNC: 8 MMOL/L (ref 9–17)
BACTERIA: ABNORMAL
BASOPHILS # BLD: 0 % (ref 0–2)
BASOPHILS ABSOLUTE: 0 K/UL (ref 0–0.2)
BILIRUBIN URINE: ABNORMAL
BUN BLDV-MCNC: 40 MG/DL (ref 6–20)
BUN/CREAT BLD: 25 (ref 9–20)
CALCIUM SERPL-MCNC: 8.5 MG/DL (ref 8.6–10.4)
CASTS UA: ABNORMAL /LPF
CHLORIDE BLD-SCNC: 91 MMOL/L (ref 98–107)
CO2: 30 MMOL/L (ref 20–31)
COLOR: ABNORMAL
COMMENT UA: ABNORMAL
CREAT SERPL-MCNC: 1.57 MG/DL (ref 0.7–1.2)
CRYSTALS, UA: ABNORMAL /HPF
DIFFERENTIAL TYPE: YES
EKG ATRIAL RATE: 84 BPM
EKG P AXIS: 9 DEGREES
EKG P-R INTERVAL: 194 MS
EKG Q-T INTERVAL: 392 MS
EKG QRS DURATION: 114 MS
EKG QTC CALCULATION (BAZETT): 463 MS
EKG R AXIS: 1 DEGREES
EKG T AXIS: 24 DEGREES
EKG VENTRICULAR RATE: 84 BPM
EOSINOPHILS RELATIVE PERCENT: 1 % (ref 0–5)
EPITHELIAL CELLS UA: ABNORMAL /HPF
GFR AFRICAN AMERICAN: 56 ML/MIN
GFR NON-AFRICAN AMERICAN: 46 ML/MIN
GFR SERPL CREATININE-BSD FRML MDRD: ABNORMAL ML/MIN/{1.73_M2}
GFR SERPL CREATININE-BSD FRML MDRD: ABNORMAL ML/MIN/{1.73_M2}
GLUCOSE BLD-MCNC: 114 MG/DL (ref 70–99)
GLUCOSE URINE: NEGATIVE
HCT VFR BLD CALC: 47.3 % (ref 41–53)
HEMOGLOBIN: 14.9 G/DL (ref 13.5–17.5)
IMMATURE GRANULOCYTES: ABNORMAL %
KETONES, URINE: NEGATIVE
LEUKOCYTE ESTERASE, URINE: ABNORMAL
LV EF: 53 %
LVEF MODALITY: NORMAL
LYMPHOCYTES # BLD: 5 % (ref 13–44)
MCH RBC QN AUTO: 25 PG (ref 26–34)
MCHC RBC AUTO-ENTMCNC: 31.6 G/DL (ref 31–37)
MCV RBC AUTO: 79.3 FL (ref 80–100)
MONOCYTES # BLD: 8 % (ref 5–9)
MUCUS: ABNORMAL
NITRITE, URINE: NEGATIVE
NRBC AUTOMATED: ABNORMAL PER 100 WBC
OSMOLALITY URINE: 403 MOSM/KG (ref 80–1300)
OTHER OBSERVATIONS UA: ABNORMAL
PDW BLD-RTO: 16.5 % (ref 12.1–15.2)
PH UA: 5 (ref 5–8)
PLATELET # BLD: 204 K/UL (ref 140–450)
PLATELET ESTIMATE: ABNORMAL
PMV BLD AUTO: ABNORMAL FL (ref 6–12)
POTASSIUM SERPL-SCNC: 3.9 MMOL/L (ref 3.7–5.3)
PROTEIN UA: ABNORMAL
RBC # BLD: 5.96 M/UL (ref 4.5–5.9)
RBC # BLD: ABNORMAL 10*6/UL
RBC UA: ABNORMAL /HPF (ref 0–2)
RENAL EPITHELIAL, UA: ABNORMAL /HPF
SEG NEUTROPHILS: 86 % (ref 39–75)
SEGMENTED NEUTROPHILS ABSOLUTE COUNT: 13.5 K/UL (ref 2.1–6.5)
SERUM OSMOLALITY: 292 MOSM/KG (ref 275–295)
SODIUM BLD-SCNC: 129 MMOL/L (ref 135–144)
SPECIFIC GRAVITY UA: 1.02 (ref 1–1.03)
TRICHOMONAS: ABNORMAL
TROPONIN INTERP: ABNORMAL
TROPONIN T: ABNORMAL NG/ML
TROPONIN, HIGH SENSITIVITY: 46 NG/L (ref 0–22)
TSH SERPL DL<=0.05 MIU/L-ACNC: 0.85 MIU/L (ref 0.3–5)
TURBIDITY: ABNORMAL
URINE HGB: ABNORMAL
UROBILINOGEN, URINE: ABNORMAL
VANCOMYCIN TROUGH DATE LAST DOSE: ABNORMAL
VANCOMYCIN TROUGH DOSE AMOUNT: ABNORMAL
VANCOMYCIN TROUGH TIME LAST DOSE: ABNORMAL
VANCOMYCIN TROUGH: 23.1 UG/ML (ref 10–20)
WBC # BLD: 15.4 K/UL (ref 3.5–11)
WBC # BLD: ABNORMAL 10*3/UL
WBC UA: ABNORMAL /HPF
YEAST: ABNORMAL

## 2021-04-06 PROCEDURE — 2700000000 HC OXYGEN THERAPY PER DAY

## 2021-04-06 PROCEDURE — 99223 1ST HOSP IP/OBS HIGH 75: CPT | Performed by: FAMILY MEDICINE

## 2021-04-06 PROCEDURE — 93010 ELECTROCARDIOGRAM REPORT: CPT | Performed by: INTERNAL MEDICINE

## 2021-04-06 PROCEDURE — 93005 ELECTROCARDIOGRAM TRACING: CPT | Performed by: INTERNAL MEDICINE

## 2021-04-06 PROCEDURE — 80202 ASSAY OF VANCOMYCIN: CPT

## 2021-04-06 PROCEDURE — 94640 AIRWAY INHALATION TREATMENT: CPT

## 2021-04-06 PROCEDURE — 6360000002 HC RX W HCPCS: Performed by: INTERNAL MEDICINE

## 2021-04-06 PROCEDURE — 84443 ASSAY THYROID STIM HORMONE: CPT

## 2021-04-06 PROCEDURE — 36415 COLL VENOUS BLD VENIPUNCTURE: CPT

## 2021-04-06 PROCEDURE — 94761 N-INVAS EAR/PLS OXIMETRY MLT: CPT

## 2021-04-06 PROCEDURE — 2580000003 HC RX 258: Performed by: EMERGENCY MEDICINE

## 2021-04-06 PROCEDURE — 85025 COMPLETE CBC W/AUTO DIFF WBC: CPT

## 2021-04-06 PROCEDURE — 81001 URINALYSIS AUTO W/SCOPE: CPT

## 2021-04-06 PROCEDURE — 6370000000 HC RX 637 (ALT 250 FOR IP): Performed by: INTERNAL MEDICINE

## 2021-04-06 PROCEDURE — 1200000000 HC SEMI PRIVATE

## 2021-04-06 PROCEDURE — 2580000003 HC RX 258: Performed by: INTERNAL MEDICINE

## 2021-04-06 PROCEDURE — 84484 ASSAY OF TROPONIN QUANT: CPT

## 2021-04-06 PROCEDURE — 83935 ASSAY OF URINE OSMOLALITY: CPT

## 2021-04-06 PROCEDURE — 80048 BASIC METABOLIC PNL TOTAL CA: CPT

## 2021-04-06 PROCEDURE — 93306 TTE W/DOPPLER COMPLETE: CPT

## 2021-04-06 PROCEDURE — 83930 ASSAY OF BLOOD OSMOLALITY: CPT

## 2021-04-06 RX ORDER — VANCOMYCIN HYDROCHLORIDE 750 MG/15ML
INJECTION, POWDER, LYOPHILIZED, FOR SOLUTION INTRAVENOUS
Status: DISPENSED
Start: 2021-04-06 | End: 2021-04-06

## 2021-04-06 RX ORDER — CALCIUM CARBONATE 200(500)MG
500 TABLET,CHEWABLE ORAL 3 TIMES DAILY PRN
Status: DISCONTINUED | OUTPATIENT
Start: 2021-04-06 | End: 2021-04-08 | Stop reason: HOSPADM

## 2021-04-06 RX ORDER — CLOTRIMAZOLE AND BETAMETHASONE DIPROPIONATE 10; .64 MG/G; MG/G
CREAM TOPICAL 2 TIMES DAILY
Status: DISCONTINUED | OUTPATIENT
Start: 2021-04-06 | End: 2021-04-08 | Stop reason: HOSPADM

## 2021-04-06 RX ORDER — 0.9 % SODIUM CHLORIDE 0.9 %
500 INTRAVENOUS SOLUTION INTRAVENOUS ONCE
Status: COMPLETED | OUTPATIENT
Start: 2021-04-06 | End: 2021-04-06

## 2021-04-06 RX ADMIN — FENOFIBRATE 160 MG: 160 TABLET ORAL at 08:07

## 2021-04-06 RX ADMIN — ACETAMINOPHEN 650 MG: 325 TABLET, FILM COATED ORAL at 05:01

## 2021-04-06 RX ADMIN — SODIUM CHLORIDE, PRESERVATIVE FREE 10 ML: 5 INJECTION INTRAVENOUS at 22:01

## 2021-04-06 RX ADMIN — CLOTRIMAZOLE AND BETAMETHASONE DIPROPIONATE: 10; .5 CREAM TOPICAL at 08:20

## 2021-04-06 RX ADMIN — SODIUM CHLORIDE: 9 INJECTION, SOLUTION INTRAVENOUS at 04:53

## 2021-04-06 RX ADMIN — ALBUTEROL SULFATE 2.5 MG: 2.5 SOLUTION RESPIRATORY (INHALATION) at 21:23

## 2021-04-06 RX ADMIN — ALBUTEROL SULFATE 2.5 MG: 2.5 SOLUTION RESPIRATORY (INHALATION) at 13:26

## 2021-04-06 RX ADMIN — ENOXAPARIN SODIUM 40 MG: 40 INJECTION SUBCUTANEOUS at 08:07

## 2021-04-06 RX ADMIN — SODIUM CHLORIDE 500 ML: 9 INJECTION, SOLUTION INTRAVENOUS at 06:03

## 2021-04-06 RX ADMIN — SODIUM CHLORIDE: 9 INJECTION, SOLUTION INTRAVENOUS at 22:52

## 2021-04-06 RX ADMIN — ALBUTEROL SULFATE 2.5 MG: 2.5 SOLUTION RESPIRATORY (INHALATION) at 05:18

## 2021-04-06 RX ADMIN — ASPIRIN 81 MG: 81 TABLET, COATED ORAL at 08:07

## 2021-04-06 RX ADMIN — ALBUTEROL SULFATE 2.5 MG: 2.5 SOLUTION RESPIRATORY (INHALATION) at 09:14

## 2021-04-06 RX ADMIN — ENOXAPARIN SODIUM 40 MG: 40 INJECTION SUBCUTANEOUS at 21:09

## 2021-04-06 RX ADMIN — VANCOMYCIN HYDROCHLORIDE 1750 MG: 750 INJECTION, POWDER, LYOPHILIZED, FOR SOLUTION INTRAVENOUS at 04:52

## 2021-04-06 RX ADMIN — PIPERACILLIN SODIUM AND TAZOBACTAM SODIUM 3375 MG: 3; .375 INJECTION, POWDER, LYOPHILIZED, FOR SOLUTION INTRAVENOUS at 02:45

## 2021-04-06 RX ADMIN — PIPERACILLIN SODIUM AND TAZOBACTAM SODIUM 3375 MG: 3; .375 INJECTION, POWDER, LYOPHILIZED, FOR SOLUTION INTRAVENOUS at 21:09

## 2021-04-06 RX ADMIN — CALCIUM CARBONATE 500 MG: 500 TABLET, CHEWABLE ORAL at 22:01

## 2021-04-06 RX ADMIN — VANCOMYCIN HYDROCHLORIDE 1500 MG: 1 INJECTION, POWDER, LYOPHILIZED, FOR SOLUTION INTRAVENOUS at 16:01

## 2021-04-06 RX ADMIN — PIPERACILLIN SODIUM AND TAZOBACTAM SODIUM 3375 MG: 3; .375 INJECTION, POWDER, LYOPHILIZED, FOR SOLUTION INTRAVENOUS at 10:14

## 2021-04-06 ASSESSMENT — PAIN SCALES - GENERAL
PAINLEVEL_OUTOF10: 4
PAINLEVEL_OUTOF10: 0
PAINLEVEL_OUTOF10: 3
PAINLEVEL_OUTOF10: 0
PAINLEVEL_OUTOF10: 0
PAINLEVEL_OUTOF10: 3
PAINLEVEL_OUTOF10: 0

## 2021-04-06 ASSESSMENT — PAIN DESCRIPTION - LOCATION: LOCATION: BACK

## 2021-04-06 ASSESSMENT — PAIN DESCRIPTION - PAIN TYPE: TYPE: CHRONIC PAIN

## 2021-04-06 NOTE — PLAN OF CARE
Problem: Falls - Risk of:  Goal: Will remain free from falls  Description: Will remain free from falls  4/5/2021 1953 by Marita Winter RN  Outcome: Met This Shift     Problem: Daily Care:  Goal: Daily care needs are met  Description: Daily care needs are met  4/6/2021 1982 by Chantel Desouza RN  Outcome: Met This Shift  4/5/2021 1953 by Marita Winter RN  Outcome: Ongoing     Problem: Pain:  Goal: Pain level will decrease  Description: Pain level will decrease  4/6/2021 0212 by Chantel Desouza RN  Outcome: Ongoing  4/5/2021 1953 by Marita Winter RN  Outcome: Ongoing  Goal: Control of acute pain  Description: Control of acute pain  4/6/2021 0212 by Chantel Desouza RN  Outcome: Ongoing  4/5/2021 1953 by Marita Winter RN  Outcome: Ongoing  Goal: Control of chronic pain  Description: Control of chronic pain  Outcome: Ongoing  Goal: Patient's pain/discomfort is manageable  Description: Patient's pain/discomfort is manageable  4/6/2021 0212 by Chantel Desouza RN  Outcome: Ongoing  4/5/2021 1953 by Marita Winter RN  Outcome: Met This Shift     Problem: Skin Integrity:  Goal: Will show no infection signs and symptoms  Description: Will show no infection signs and symptoms  4/6/2021 0212 by Chantel Desouza RN  Outcome: Ongoing  4/5/2021 1953 by Marita Winter RN  Outcome: Ongoing  Goal: Absence of new skin breakdown  Description: Absence of new skin breakdown  Outcome: Ongoing     Problem: Skin Integrity:  Goal: Skin integrity will stabilize  Description: Skin integrity will stabilize  4/6/2021 0212 by Chantel Desouza RN  Outcome: Ongoing  4/5/2021 1953 by Marita Winter RN  Outcome: Ongoing     Problem: Discharge Planning:  Goal: Patients continuum of care needs are met  Description: Patients continuum of care needs are met  4/5/2021 1953 by Marita Winter RN  Outcome: Ongoing     Problem: Cardiac:  Goal: Ability to maintain vital signs within normal range will improve  Description: Ability to maintain vital signs within normal range will improve  4/6/2021 0212 by Selena White RN  Outcome: Ongoing  4/5/2021 1953 by Susana Robles RN  Outcome: Ongoing  Goal: Ability to maintain an adequate cardiac output will improve  Description: Ability to maintain an adequate cardiac output will improve  Outcome: Ongoing     Problem: Health Behavior:  Goal: Will modify at least one risk factor affecting health status  Description: Will modify at least one risk factor affecting health status  4/5/2021 1953 by Susana Robles RN  Outcome: Ongoing     Problem: Pain:  Goal: Pain level will decrease  Description: Pain level will decrease  4/6/2021 0212 by Selena White RN  Outcome: Ongoing  4/5/2021 1953 by Susana Robles RN  Outcome: Ongoing     Problem: Respiratory:  Goal: Respiratory status will improve  Description: Respiratory status will improve  Outcome: Ongoing

## 2021-04-06 NOTE — PROGRESS NOTES
Quality flow rounds held on 4/6/21     Madie Guaman is admitted for  Cellulitis of right leg    Length of stay 1. Education:    Needed Education: disease process, meds, follow up,diet      Do you have any questions regarding your plan of care while at the hospital? denies    Planned Disposition:               [x]  Home when able                [] Swing Bed                [] ECF/SNF               [] Other/TBD    Barriers to Discharge:    Can you afford your medications? yes   Do you have transportation to follow up appointments? Drives self   Do you need any new equipment at home? none   Current equipment includes   wheelchair, ramp is started not completely finished, shower chair, oxygen in home but it is his ex father in [address] [place] [address], will need to get his own. Has prothesis for left leg but does not fit due to weight change. Do you have a living will or durable power of  for healthcare? denies               If yes do we have a copy on file? no    Do you or your family have any questions or concerns we haven't already discussed? denies    Lives with his daughter and his ex-father in law along with her 3 children. Has a son that lives in [place]. Plans to return home, will need his oxygen supplied. States his PCP is Dr Hany Oconnor. RN will assist with scheduling of follow up appt at discharge. Soniya Hernandez and writer present for rounding. Pregnancy Pregnancy Induction of labor for post dates

## 2021-04-06 NOTE — CONSULTS
Palliative Care Notes    Reason for Consult:  Chronic disease support, ACP    Patient Active Problem List   Diagnosis    Cellulitis of lower leg    Hemoptysis    HCV (hepatitis C virus)    HTN (hypertension)    Hx MRSA infection    Morbid obesity with BMI of 45.0-49.9, adult ([facility]    COPD (chronic obstructive pulmonary disease) ([facility]    Other hyperlipidemia    Cellulitis of right leg    Sepsis with acute hypoxic respiratory failure without septic shock (HCC)    Elevated troponin    Type 2 MI (myocardial infarction) ([facility]    Acute on chronic combined systolic and diastolic CHF, NYHA class 4 ([facility]       Advance Directives:  Code status: Full Code  Patient has capacity for medical decisions: yes  Health Care Power of : no  Living Will: no        Pain Management:  The patient is not having any pain. Symptom Management:  Are there any other symptoms that are distressing to the patient or family that needs addressed? Anxiety:  none                          Dyspnea:  chronic dyspnea and uses family members home oxygen                          Fatigue:  exercise intolerance                          Bladder function:  Denies issues                          Bowel function:  none    Other:  None     Spiritual history/needs:   notified: n/a    Palliative Performance Scale:  ___70%  Ambulation reduced; Some disease; Can't do normal job or work; intake normal or reduced; can do full self care; LOC full  _x__60%  Ambulation reduced; Significant disease; Can't do hobbies/housework; intake normal or reduced; occasional assist; LOC full/confusion  ___50%  Mainly sit/lie; Extensive disease; Can't do any work; Considerable assist; intake normal or reduced; LOC full/confusion  ___40%  Mainly in bed; Extensive disease; Mainly assist; intake normal or reduced; LOC full/confusion   ___30%  Bed Bound; Extensive disease;  Total care; intake reduced; LOC full/confusion  ___20%  Bed Bound; Extensive disease; Total care; intake minimal; Drowsy/coma  ___10%  Bed Bound; Extensive disease; Total care; Mouth care only; Drowsy/coma  ___0       Death    Readmission Risk:  15%    Notes:   [place] is resting in bed for our visit. He was seen in the emergency room for redness in his right lower extremity. He has a history of left above the knee amputation and COPD. The woodlands lives at home with his daughter, her three children and his ex-father in law. The wood[place] states that he has a wheelchair and shower chair at home. He is independent with his ADL's, manages his own medications and drives. During our last discussion in January of 2020 he was going to follow up with a pulmonologist. States that he has not done that yet. That the appointments \"got all crazy because of COVID. \" States that he does have a CAT scan scheduled for April 29 th and will then schedule an appointment to follow up with pulmonology after that. States that he has been using his another family members home oxygen. I ask him how often he uses it. States that he has been 88% while in the office on 2 occasions but never had oxygen set up, so he uses his family members oxygen. States that he uses it multiple times a day. Denies being SOB. Upon further discussion he reveals that he told his PCP that he already had home oxygen but did not tell them that it was for another family member. Discussed with  who can assist with home oxygen set up at discharge. Discussed advanced directives. He currently is a full code and wishes to remain so. ACP activator note complete. He has no interest in completing advanced directives. States \"My son knows what I want. \" Denies further needs.      Palliative Care Plan:  Education/support to patient  Discharge planning/helping to coordinate care  Validating patient/family distress  Palliative Care Goals:  improve or maintain function/quality of life, remain at home,

## 2021-04-06 NOTE — PROGRESS NOTES
Spoke with [place] with the after hours answering service, notified of consult all requested information provided.

## 2021-04-06 NOTE — PLAN OF CARE
Pt reports his pain is manageable at this time; his daughter is suppose to be bringing home his home medication.

## 2021-04-06 NOTE — ACP (ADVANCE CARE PLANNING)
Advance Care Planning     Advance Care Planning Activator (Inpatient)  Conversation Note      Date of ACP Conversation: 4/5/2021    Conversation Conducted with: Patient with Decision Making Capacity    ACP Activator: Melly RODRÍGUEZ [address] [address] [facility] [address] Maker:     Current Designated Health Care Decision Maker:     Primary Decision Maker: Khleo Arellano - Child - [phone]    Secondary Decision Maker: Hakeem Garcia Child - [phone]    Today we documented Decision Maker(s) consistent with Legal Next of Kin hierarchy. Care Preferences    Ventilation: \"If you were in your present state of health and suddenly became very ill and were unable to breathe on your own, what would your preference be about the use of a ventilator (breathing machine) if it were available to you? \"      Would the patient desire the use of ventilator (breathing machine)?: yes    \"If your health worsens and it becomes clear that your chance of recovery is unlikely, what would your preference be about the use of a ventilator (breathing machine) if it were available to you? \"     Would the patient desire the use of ventilator (breathing machine)?: No      Resuscitation  \"CPR works best to restart the heart when there is a sudden event, like a heart attack, in someone who is otherwise healthy. Unfortunately, CPR does not typically restart the heart for people who have serious health conditions or who are very sick. \"    \"In the event your heart stopped as a result of an underlying serious health condition, would you want attempts to be made to restart your heart (answer \"yes\" for attempt to resuscitate) or would you prefer a natural death (answer \"no\" for do not attempt to resuscitate)? \" yes       [x] Yes   [] No   Educated Patient / Nita Rodriguez regarding differences between Advance Directives and portable DNR orders.     Length of ACP Conversation in minutes:  7    Conversation Outcomes:  [x] ACP discussion completed  [] Existing advance directive reviewed with patient; no changes to patient's previously recorded wishes  [] New Advance Directive completed  [] Portable Do Not Rescitate prepared for Provider review and signature  [] POLST/POST/MOLST/MOST prepared for Provider review and signature      Follow-up plan:    [] Schedule follow-up conversation to continue planning  [] Referred individual to Provider for additional questions/concerns   [] Advised patient/agent/surrogate to review completed ACP document and update if needed with changes in condition, patient preferences or care setting    [x] This note routed to one or more involved healthcare providers

## 2021-04-06 NOTE — PROGRESS NOTES
9827- borders marked on RLE with surgical marker, medicated lotion applied, wrapped with kerlix and ace wrap.  0645- pt off floor to CT, unable to lay flat and refuses, Dr. Shreyas Gibson notified, pt returns to floor

## 2021-04-06 NOTE — H&P
88/50. He reports no chest pain or dizziness. Has chronic shortness of breath and states on oxygen at home. This morning he has no pain in the right lower extremity. Past Medical History:        Diagnosis Date    Addiction to drug [facility])     CAD (coronary artery disease)     COPD (chronic obstructive pulmonary disease) ([facility] [address]     Hypertension     Kidney failure, acute (HCC)     due to mrsa, reversed    MRSA (methicillin resistant Staphylococcus aureus)     Patient requiring acute dialysis [facility]) July 2010    D/T organ failure from MRSA       Past Surgical History:        Procedure Laterality Date    ABOVE KNEE AMPUTATION Left     ANKLE SURGERY      right; s/p crush injury    CARDIAC SURGERY      valve replacement    JOINT REPLACEMENT  knee replacement, removed, MRSA    KNEE SURGERY      several on lt    LUNG SURGERY         Home Medications:   No current facility-administered medications on file prior to encounter.       Current Outpatient Medications on File Prior to Encounter   Medication Sig Dispense Refill    metoprolol tartrate (LOPRESSOR) 50 MG tablet Take 1.5 tablets by mouth 2 times daily 90 tablet 5    albuterol sulfate HFA (VENTOLIN HFA) 108 (90 Base) MCG/ACT inhaler Inhale 2 puffs into the lungs every 6 hours as needed for Wheezing 1 Inhaler 3    furosemide (LASIX) 20 MG tablet Take 1 tablet by mouth every other day 15 tablet 5    potassium chloride (KLOR-CON M) 10 MEQ extended release tablet Take 1 tablet by mouth every other day 15 tablet 5    lisinopril (PRINIVIL;ZESTRIL) 5 MG tablet Take 1 tablet by mouth daily 30 tablet 3    fenofibrate (TRIGLIDE) 160 MG tablet TAKE 1 TABLET BY MOUTH EVERY DAY 90 tablet 1    triamterene-hydroCHLOROthiazide (MAXZIDE-25) 37.5-25 MG per tablet TAKE 1 TABLET BY MOUTH EVERY DAY 90 tablet 1    albuterol (PROVENTIL) (2.5 MG/3ML) 0.083% nebulizer solution Take 3 mLs by nebulization every 4 hours as needed for Wheezing or Shortness of Breath 120 each 11 sounds  Extremities: S/p left AKA, right lower extremity diffusely right, skin tender to touch, leg is very swollen  Skin: warm and dry  Head: normocephalic and atraumatic, oral mucosa moist  Eyes: pupils equal, round, and reactive to light  Neck: supple and non-tender without mass, no thyromegaly   Musculoskeletal: normal range of motion, no joint swelling  Neurological: alert, oriented, normal speech, no focal findings or movement disorder noted    Review of Labs and Diagnostic Testing:    Recent Results (from the past 24 hour(s))   CBC Auto Differential    Collection Time: 04/05/21 11:29 AM   Result Value Ref Range    WBC 18.2 (H) 3.5 - 11.0 k/uL    RBC 6.30 (H) 4.5 - 5.9 m/uL    Hemoglobin 16.3 13.5 - 17.5 g/dL    Hematocrit 49.2 41 - 53 %    MCV 78.1 (L) 80 - 100 fL    MCH 25.8 (L) 26 - 34 pg    MCHC 33.0 31 - 37 g/dL    RDW 16.7 (H) 12.1 - 15.2 %    Platelets 065 089 - 398 k/uL    MPV NOT REPORTED 6.0 - 12.0 fL    NRBC Automated NOT REPORTED per 100 WBC    Differential Type NOT REPORTED     Immature Granulocytes NOT REPORTED 0 %    Absolute Immature Granulocyte NOT REPORTED 0.00 - 0.30 k/uL    WBC Morphology NOT REPORTED     RBC Morphology NOT REPORTED     Platelet Estimate NOT REPORTED     Seg Neutrophils 63 39 - 75 %    Lymphocytes 8 (L) 13 - 44 %    Monocytes 9 5 - 9 %    Eosinophils %      0 - 5 %    Basophils      0 - 2 %    Bands 20 (H) 0 - 10 %    Segs Absolute 11.46 (H) 2.1 - 6.5 k/uL    Absolute Lymph # 1.46 1.0 - 4.8 k/uL    Absolute Mono # 1.64 (H) 0.0 - 1.0 k/uL    Absolute Eos #      0.0 - 0.4 k/uL    Basophils Absolute      0.0 - 0.2 k/uL    Absolute Bands # 3.64 (H) 0.0 - 1.0 k/uL    Morphology Manual Differential Performed    Comprehensive Metabolic Panel    Collection Time: 04/05/21 11:29 AM   Result Value Ref Range    Glucose 113 (H) 70 - 99 mg/dL    BUN 33 (H) 6 - 20 mg/dL    CREATININE 0.89 0.70 - 1.20 mg/dL    Bun/Cre Ratio 37 (H) 9 - 20    Calcium 9.1 8.6 - 10.4 mg/dL    Sodium 129 (L) 135 - 144 mmol/L    Potassium 3.8 3.7 - 5.3 mmol/L    Chloride 88 (L) 98 - 107 mmol/L    CO2 32 (H) 20 - 31 mmol/L    Anion Gap 9 9 - 17 mmol/L    Alkaline Phosphatase 90 40 - 129 U/L    ALT 28 5 - 41 U/L    AST 32 <40 U/L    Total Bilirubin 1.65 (H) 0.30 - 1.20 mg/dL    Total Protein 8.0 6.4 - 8.3 g/dL    Albumin 3.2 (L) 3.5 - 5.2 g/dL    Albumin/Globulin Ratio NOT REPORTED 1.0 - 2.5    GFR Non-African American >60 >60 mL/min    GFR African American >60 >60 mL/min    GFR Comment          GFR Staging NOT REPORTED    Lactic Acid    Collection Time: 04/05/21 11:29 AM   Result Value Ref Range    Lactic Acid 1.6 0.5 - 2.2 mmol/L   Troponin    Collection Time: 04/05/21 11:29 AM   Result Value Ref Range    Troponin, High Sensitivity 64 (HH) 0 - 22 ng/L    Troponin T NOT REPORTED <0.03 ng/mL    Troponin Interp NOT REPORTED    EKG 12 Lead    Collection Time: 04/05/21 11:34 AM   Result Value Ref Range    Ventricular Rate 102 BPM    Atrial Rate 102 BPM    P-R Interval 178 ms    QRS Duration 110 ms    Q-T Interval 350 ms    QTc Calculation (Bazett) 456 ms    P Axis 32 degrees    R Axis 37 degrees    T Axis 50 degrees   Troponin    Collection Time: 04/05/21 12:45 PM   Result Value Ref Range    Troponin, High Sensitivity 60 (HH) 0 - 22 ng/L    Troponin T NOT REPORTED <0.03 ng/mL    Troponin Interp NOT REPORTED    COVID-19, Rapid    Collection Time: 04/05/21 12:49 PM    Specimen: Nasopharyngeal Swab   Result Value Ref Range    Specimen Description . NASOPHARYNGEAL SWAB     SARS-CoV-2, Rapid Not Detected Not Detected   Troponin    Collection Time: 04/05/21  3:41 PM   Result Value Ref Range    Troponin, High Sensitivity 65 (HH) 0 - 22 ng/L    Troponin T NOT REPORTED <0.03 ng/mL    Troponin Interp NOT REPORTED    Troponin    Collection Time: 04/05/21  9:26 PM   Result Value Ref Range    Troponin, High Sensitivity 77 (HH) 0 - 22 ng/L    Troponin T NOT REPORTED <0.03 ng/mL    Troponin Interp NOT REPORTED    Urinalysis    Collection Time: 04/06/21  5:00 AM   Result Value Ref Range    Color, UA HEBER (A) YELLOW    Turbidity UA HAZY (A) CLEAR    Glucose, Ur NEGATIVE NEGATIVE    Bilirubin Urine NEGATIVE  Verified by ictotest. (A) NEGATIVE    Ketones, Urine NEGATIVE NEGATIVE    Specific Gravity, UA 1.020 1.005 - 1.030    Urine Hgb TRACE (A) NEGATIVE    pH, UA 5.0 5.0 - 8.0    Protein, UA 1+ (A) NEGATIVE    Urobilinogen, Urine 8 mg/dL (A) Normal    Nitrite, Urine NEGATIVE NEGATIVE    Leukocyte Esterase, Urine 2+ (A) NEGATIVE    Urinalysis Comments         Microscopic Urinalysis    Collection Time: 04/06/21  5:00 AM   Result Value Ref Range    -          WBC, UA 10 TO 20 0 /HPF    RBC, UA 0 TO 2 0 - 2 /HPF    Casts UA NOT REPORTED /LPF    Crystals, UA NOT REPORTED None /HPF    Epithelial Cells UA 0 TO 2 /HPF    Renal Epithelial, UA NOT REPORTED 0 /HPF    Bacteria, UA 1+ (A) None    Mucus, UA NOT REPORTED None    Trichomonas, UA NOT REPORTED None    Amorphous, UA NOT REPORTED None    Other Observations UA NOT REPORTED NOT REQ.     Yeast, UA NOT REPORTED None   Basic Metabolic Panel w/ Reflex to MG    Collection Time: 04/06/21  5:10 AM   Result Value Ref Range    Glucose 114 (H) 70 - 99 mg/dL    BUN 40 (H) 6 - 20 mg/dL    CREATININE 1.57 (H) 0.70 - 1.20 mg/dL    Bun/Cre Ratio 25 (H) 9 - 20    Calcium 8.5 (L) 8.6 - 10.4 mg/dL    Sodium 129 (L) 135 - 144 mmol/L    Potassium 3.9 3.7 - 5.3 mmol/L    Chloride 91 (L) 98 - 107 mmol/L    CO2 30 20 - 31 mmol/L    Anion Gap 8 (L) 9 - 17 mmol/L    GFR Non-African American 46 (L) >60 mL/min    GFR  56 (L) >60 mL/min    GFR Comment          GFR Staging NOT REPORTED    CBC auto differential    Collection Time: 04/06/21  5:10 AM   Result Value Ref Range    WBC 15.4 (H) 3.5 - 11.0 k/uL    RBC 5.96 (H) 4.5 - 5.9 m/uL    Hemoglobin 14.9 13.5 - 17.5 g/dL    Hematocrit 47.3 41 - 53 %    MCV 79.3 (L) 80 - 100 fL    MCH 25.0 (L) 26 - 34 pg    MCHC 31.6 31 - 37 g/dL    RDW 16.5 (H) 12.1 - 15.2 %    Platelets 204 140 - 450 k/uL    MPV NOT REPORTED 6.0 - 12.0 fL    NRBC Automated NOT REPORTED per 100 WBC    Differential Type YES     Immature Granulocytes NOT REPORTED 0 %    Absolute Immature Granulocyte NOT REPORTED 0.00 - 0.30 k/uL    WBC Morphology NOT REPORTED     RBC Morphology NOT REPORTED     Platelet Estimate NOT REPORTED     Seg Neutrophils 86 (H) 39 - 75 %    Lymphocytes 5 (L) 13 - 44 %    Monocytes 8 5 - 9 %    Eosinophils % 1 0 - 5 %    Basophils 0 0 - 2 %    Segs Absolute 13.50 (H) 2.1 - 6.5 k/uL    Absolute Lymph # 0.70 (L) 1.0 - 4.8 k/uL    Absolute Mono # 1.20 (H) 0.0 - 1.0 k/uL    Absolute Eos # 0.10 0.0 - 0.4 k/uL    Basophils Absolute 0.00 0.0 - 0.2 k/uL       Radiology:     Xr Chest Portable    Result Date: 4/5/2021  EXAM: XR CHEST PORTABLE HISTORY: Reason for exam:->Sepsis COMPARISON: April 24, 2020 TECHNIQUE: Frontal chest FINDINGS: Redemonstration of sternotomy. Redemonstration of apparently chronic changes at the left base without apparent change from study of approximately one year prior. There is redemonstration of changes of prior CABG, redemonstration of changes in keeping with pulmonary vascular congestion, a small amount of patchy opacity right base is questioned atelectatic. Pulmonary vascular congestion. While a small amount of infiltrate is not excluded, the findings are not definitive that the lungs are the source of the stated sepsis. Findings overall with only slight change from study of approximately one year prior       Assessment/ Plan:    1. Right lower extremity cellulitis - patient is IV vancomycin and Zosyn, blood cultures pending. Pain controlled with prn IV Toradol, however will discontinue due to developing acute renal failure. Continue on as needed Tylenol. We will try to avoid opioid analgesics due to history of drug addiction and current Suboxone use  2.   Severe sepsis - patient is hypotensive, hold all of blood pressure medications, continue on IV fluids, will bolus with IV fluids this morning, closely monitor on telemetry. Lactic acid level was normal.  Leukocytosis is resolving  3. Acute renal failure -most likely secondary to severe sepsis. Continue IV fluids, avoid NSAIDs  4. Hyponatremia - most likely due to diuretics (was on Maxide )and acute renal failure, continue on IV fluids and monitor sodium level. Will check TSH, serum and urine osmolality   5. Elevated troponin - patient with no complaints of chest pain, EKG this morning reveals normal sinus rhythm. Will consult cardiology  for recommendations. 2D echo ordered. 6.  COPD with hypoxia -continue on oxygen supplement, aerosol treatments. Patient reports that he is oxygen dependent and has oxygen at home. 7.  Nicotine dependence  - on nicotine patch  8. 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 History of left lung lobectomy secondary to MRSA infection in 2010  10. 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 Morbid obesity  12. Suspected sleep apnea -patient did not complete sleep study  13. H/O pulmonary nodule per CT chest from 1/6/2020 - patient did not complete follow-up CT chest.  Will order during this admission      Medical Necessity: Inpatient admission is appropriate for this patient secondary to the need of IV antibiotics, IV fluids, cardiology consultation, monitoring vitals, labs    Estimated length of stay: 3  days. The beneficiary may reasonably be expected to be discharged or transferred to a hospital within 96 hours after admission.     DVT prophylaxis:   [x] Lovenox   [] SCDs   [] SQ Heparin   [] Encourage ambulation, low risk for DVT, no chemical or mechanical    prophylaxis necessary      [] Already on Anticoagulation    Anticipated Disposition upon discharge:   [] Home   [] Home with Home Health   [] [facility]   [] [address]      Electronically signed by Eduardo Doran MD on

## 2021-04-06 NOTE — PROGRESS NOTES
Pt continues to sit erect in bed to sleep, hunched over similar to a tripod position. SpO2 remains 90-92% on 3L, RR 16, lungs diminished but clear, pt denies SOB or CP. Will continue to monitor.

## 2021-04-06 NOTE — CONSULTS
Patient: Chris Prado  : 1965  Date of Admission: 2021  Primary Care Physician: Miky Fournier  Today's Date: 2021    REASON FOR CONSULTATION: Leg Pain (pt presents to the ED with c/o right leg pain - states severe pain/swelling started yesterday - states his leg always has some degree of redness/swelling but not as bad as it is today)      HPI: Mr. Rhoda Kiran is a 48 y.o. male who was admitted to the hospital with a several week history of progressive increased lower extremity edema and what sounds to be right leg cellulitis as well as possible sepsis and an elevated troponin leading to hospitalization and this consultation. Mr. Rhoda Kiran is a 48 y.o. man who has a history of drug abuse. He had bacterial endocarditis secondary to IV drug usage in  and he had a tricuspid valve replacement in [place]. He moved back to [facility] in . He is on disability for arthritis and did have a hospitalization back in  for cellulitis and was at that time positive for opiates. More recently he has been treated with suboxone. Symptoms of heart failure: He reports that he is non-ambulatory. Mr. Rhoda Kiran says that because of his right lower extremity amputation he can not walk at all. Mr. Rhoda Kiran says that he has never been told that he had any kind of peripheral vascular disease or every got stenting of his lower extremity but says that his left leg was removed due to recurrent non-healing cellulitis/ulcers. He also reports severe chronic lower extremity edema. He says that he will intermittently get a kind of right leg pimple which will often times become red and swollen and require antibiotics. Mr. Rhoda Kiran also denied any current or recent chest pain, abdominal pain, bleeding problems, problems with his medications or any other concerns at this time.      Past Medical History:   Diagnosis Date    Addiction to drug [facility])     CAD (coronary artery disease)     COPD (chronic obstructive (1.905 m)   Wt (!) 350 lb (158.8 kg)   SpO2 94%   BMI 43.75 kg/m²  Body mass index is 43.75 kg/m². [ INSTRUCTIONS:  \"[x]\" Indicates a positive item  \"[]\" Indicates a negative item   Vital Signs: (As obtained by patient/caregiver or practitioner observation)    Blood pressure-  Heart rate-    Respiratory rate-    Temperature-  Pulse oximetry-     Constitutional: [x] Appears well-developed and well-nourished [x] No apparent distress      [] Abnormal-   Mental status  [x] Alert and awake  [x] Oriented to person/place/time [x]Able to follow commands      Eyes:  EOM    [x]  Normal  [] Abnormal-  Sclera  [x]  Normal  [] Abnormal -         Discharge [x]  None visible  [] Abnormal -    HENT:   [x] Normocephalic, atraumatic.   [] Abnormal   [] Mouth/Throat: Mucous membranes are moist.     External Ears [x] Normal  [] Abnormal-     Neck: [x] No visualized mass     Pulmonary/Chest: [x] Respiratory effort normal.  [x] No visualized signs of difficulty breathing or respiratory distress        [] Abnormal-     Neurological:        [x] No Facial Asymmetry (Cranial nerve 7 motor function) (limited exam to video visit)          [] No gaze palsy        [] Abnormal-         Skin:        [x] No significant exanthematous lesions or discoloration noted on facial skin         [] Abnormal-            Psychiatric:       [x] Normal Affect [] No Hallucinations        [] Abnormal-     Other pertinent observable physical exam findings: None       MOST RECENT LABS ON RECORD:   Lab Results   Component Value Date    WBC 15.4 (H) 04/06/2021    HGB 14.9 04/06/2021    HCT 47.3 04/06/2021     04/06/2021    ALT 28 04/05/2021    AST 32 04/05/2021     (L) 04/06/2021    K 3.9 04/06/2021    CL 91 (L) 04/06/2021    CREATININE 1.57 (H) 04/06/2021    BUN 40 (H) 04/06/2021    CO2 30 04/06/2021    TSH 0.85 04/06/2021    INR 1.1 01/06/2020        ASSESSMENT:  Patient Active Problem List    Diagnosis Date Noted    Cellulitis of right leg     Sepsis with acute hypoxic respiratory failure without septic shock (HCC)     Elevated troponin     Type 2 MI (myocardial infarction) ([facility]     Acute on chronic combined systolic and diastolic CHF, NYHA class 4 (HCC)     Morbid obesity with BMI of 45.0-49.9, adult ([facility] 03/15/2021    COPD (chronic obstructive pulmonary disease) ([facility] 03/15/2021    Other hyperlipidemia 03/15/2021    Hemoptysis 01/06/2020    Cellulitis of lower leg 06/13/2019    HCV (hepatitis C virus) 01/29/2011    HTN (hypertension) 01/29/2011    Hx MRSA infection 01/29/2011        PLAN:    · Right lower extremity cellulitis with possible early SIRS/Sepsis: Mild hypotension with WBC count of >15  · Known history of MRSA endocarditis back in 2009 leading to tricuspid valve replacement. · Because of this history, I ordered a echocardiogram to better assess the severity of this problem. · Agree with broad spectrum antibiotics pending cultures as well as fluid bolus etc  · If hypotension becomes more severe and/or shortness of breath worsens would consider norepiniphrine drip if necessary    Type 2 Myocardial Infarction: Mr. Fartun Scott meets criteria for a Type 2 MI defined by a rise and fall of troponin with at least one value above the 99th percentile and evidence of an imbalance between myocardial oxygen supply and demand unrelated to coronary thrombosis, evidenced by symptoms of acute myocardial ischemia which has manifested as the development of heart failure and shortness of breath at rest as well as at least mild acute on chronic renal dysfunction. I do suspect that he does have underlying coronary artery disease but not an active ACS process but would repeat troponin as it was technically still rising but no active chest pain etc.  Because of this history, I ordered a echocardiogram to better assess the severity of this problem.         Acute on chronic combined heart failure: New York Heart Association Class: IV (Severe limitations, Emergencies Act, 1135 waiver authority and the Coronavirus Preparedness and Response Supplemental Appropriations Act, this Virtual Visit was conducted with patient's (and/or legal guardian's) consent, to reduce the patient's risk of exposure to COVID-19 and provide necessary medical care. The patient (and/or legal guardian) has also been advised to contact this office for worsening conditions or problems, and seek emergency medical treatment and/or call 911 if deemed necessary. Services were provided through a video synchronous discussion virtually to substitute for in-person visit. Mr. Luna Reyes was located in the patients hospital room in [address] [place] [address]  performed the visit from my office in [facility] in [place] [address], Kamari Freitas MD on 4/6/2021 at 1:48 PM    An electronic signature was used to authenticate this note. --Gris Lowery MD on 4/6/2021 at 1:17 PM    An electronic signature was used to authenticate this note.

## 2021-04-06 NOTE — PROGRESS NOTES
Trough = 23.1. Will decrease dose to 1500 mg every 8 hours to begin today @ 1700. Repeat trough to be drawn tomorrow @ 1630.    Fco Nazario PharmD 4/6/2021 2:33 PM

## 2021-04-06 NOTE — PROGRESS NOTES
SW met with pt to complete assessment during quality rounds this morning with RN case manager. Pt is alert and oriented and cooperative with assessment. Pt is a 48year old male admitted for cellulitis of right leg. Pt lives with his dtr and her three children as well as his ex father in law and ex wife. Pt reports that he has a wheelchair, a ramp that needs the handrails put on, shower chair, and a prosthesis that doesn't really fit anymore. Pt has been using his ex father in laws home oxygen and needs to have his own oxygen set up if he qualifies. Pt is not using any community services at this time. Pt states that he drives and is able to get to appointments. Pt's son lives in [place] and is supportive to him as well. Pt is a full code and follows with Dr Nae Feliz as PCP. Pt does not have advance directives and is not currently interested in these. Palliative care reviewed with him and completed ACP note. Pt reports that his medications are affordable for him. Pt plans to return home at discharge. Pt identifies no discharge needs or concerns at this time, other than his own oxygen set up. SW will follow and remain available.  Ruchi REDW 4/6/2021

## 2021-04-07 LAB
ABSOLUTE EOS #: 0.2 K/UL (ref 0–0.4)
ABSOLUTE IMMATURE GRANULOCYTE: ABNORMAL K/UL (ref 0–0.3)
ABSOLUTE LYMPH #: 0.6 K/UL (ref 1–4.8)
ABSOLUTE MONO #: 0.8 K/UL (ref 0–1)
ANION GAP SERPL CALCULATED.3IONS-SCNC: 3 MMOL/L (ref 9–17)
BASOPHILS # BLD: 0 % (ref 0–2)
BASOPHILS ABSOLUTE: 0 K/UL (ref 0–0.2)
BUN BLDV-MCNC: 23 MG/DL (ref 6–20)
BUN/CREAT BLD: 30 (ref 9–20)
CALCIUM SERPL-MCNC: 8.4 MG/DL (ref 8.6–10.4)
CHLORIDE BLD-SCNC: 95 MMOL/L (ref 98–107)
CO2: 33 MMOL/L (ref 20–31)
CREAT SERPL-MCNC: 0.77 MG/DL (ref 0.7–1.2)
DIFFERENTIAL TYPE: YES
EOSINOPHILS RELATIVE PERCENT: 2 % (ref 0–5)
GFR AFRICAN AMERICAN: >60 ML/MIN
GFR NON-AFRICAN AMERICAN: >60 ML/MIN
GFR SERPL CREATININE-BSD FRML MDRD: ABNORMAL ML/MIN/{1.73_M2}
GFR SERPL CREATININE-BSD FRML MDRD: ABNORMAL ML/MIN/{1.73_M2}
GLUCOSE BLD-MCNC: 113 MG/DL (ref 70–99)
HCT VFR BLD CALC: 43.6 % (ref 41–53)
HEMOGLOBIN: 13.9 G/DL (ref 13.5–17.5)
IMMATURE GRANULOCYTES: ABNORMAL %
LYMPHOCYTES # BLD: 6 % (ref 13–44)
MCH RBC QN AUTO: 25.4 PG (ref 26–34)
MCHC RBC AUTO-ENTMCNC: 31.9 G/DL (ref 31–37)
MCV RBC AUTO: 79.6 FL (ref 80–100)
MONOCYTES # BLD: 8 % (ref 5–9)
NRBC AUTOMATED: ABNORMAL PER 100 WBC
PDW BLD-RTO: 17.1 % (ref 12.1–15.2)
PLATELET # BLD: 229 K/UL (ref 140–450)
PLATELET ESTIMATE: ABNORMAL
PMV BLD AUTO: ABNORMAL FL (ref 6–12)
POTASSIUM SERPL-SCNC: 4.3 MMOL/L (ref 3.7–5.3)
RBC # BLD: 5.48 M/UL (ref 4.5–5.9)
RBC # BLD: ABNORMAL 10*6/UL
SEG NEUTROPHILS: 84 % (ref 39–75)
SEGMENTED NEUTROPHILS ABSOLUTE COUNT: 9 K/UL (ref 2.1–6.5)
SODIUM BLD-SCNC: 131 MMOL/L (ref 135–144)
VANCOMYCIN TROUGH DATE LAST DOSE: NORMAL
VANCOMYCIN TROUGH DOSE AMOUNT: NORMAL
VANCOMYCIN TROUGH TIME LAST DOSE: NORMAL
VANCOMYCIN TROUGH: 16.4 UG/ML (ref 10–20)
WBC # BLD: 10.7 K/UL (ref 3.5–11)
WBC # BLD: ABNORMAL 10*3/UL

## 2021-04-07 PROCEDURE — 94640 AIRWAY INHALATION TREATMENT: CPT

## 2021-04-07 PROCEDURE — 6360000002 HC RX W HCPCS: Performed by: INTERNAL MEDICINE

## 2021-04-07 PROCEDURE — 80048 BASIC METABOLIC PNL TOTAL CA: CPT

## 2021-04-07 PROCEDURE — 2700000000 HC OXYGEN THERAPY PER DAY

## 2021-04-07 PROCEDURE — 2580000003 HC RX 258: Performed by: EMERGENCY MEDICINE

## 2021-04-07 PROCEDURE — 6370000000 HC RX 637 (ALT 250 FOR IP): Performed by: INTERNAL MEDICINE

## 2021-04-07 PROCEDURE — 80202 ASSAY OF VANCOMYCIN: CPT

## 2021-04-07 PROCEDURE — 85025 COMPLETE CBC W/AUTO DIFF WBC: CPT

## 2021-04-07 PROCEDURE — 36415 COLL VENOUS BLD VENIPUNCTURE: CPT

## 2021-04-07 PROCEDURE — 2580000003 HC RX 258: Performed by: INTERNAL MEDICINE

## 2021-04-07 PROCEDURE — 1200000000 HC SEMI PRIVATE

## 2021-04-07 PROCEDURE — 94761 N-INVAS EAR/PLS OXIMETRY MLT: CPT

## 2021-04-07 RX ORDER — VANCOMYCIN HYDROCHLORIDE 1 G/20ML
INJECTION, POWDER, LYOPHILIZED, FOR SOLUTION INTRAVENOUS
Status: DISPENSED
Start: 2021-04-07 | End: 2021-04-08

## 2021-04-07 RX ORDER — VANCOMYCIN HYDROCHLORIDE 500 MG/10ML
INJECTION, POWDER, LYOPHILIZED, FOR SOLUTION INTRAVENOUS
Status: DISPENSED
Start: 2021-04-07 | End: 2021-04-08

## 2021-04-07 RX ADMIN — BUPRENORPHINE HYDROCHLORIDE AND NALOXONE HYDROCHLORIDE DIHYDRATE 1 TABLET: 8; 2 TABLET SUBLINGUAL at 10:08

## 2021-04-07 RX ADMIN — ACETAMINOPHEN 650 MG: 325 TABLET, FILM COATED ORAL at 20:29

## 2021-04-07 RX ADMIN — ENOXAPARIN SODIUM 40 MG: 40 INJECTION SUBCUTANEOUS at 20:28

## 2021-04-07 RX ADMIN — FUROSEMIDE 20 MG: 20 TABLET ORAL at 09:15

## 2021-04-07 RX ADMIN — ALBUTEROL SULFATE 2.5 MG: 2.5 SOLUTION RESPIRATORY (INHALATION) at 21:36

## 2021-04-07 RX ADMIN — BUPRENORPHINE HYDROCHLORIDE AND NALOXONE HYDROCHLORIDE DIHYDRATE 1 TABLET: 8; 2 TABLET SUBLINGUAL at 22:08

## 2021-04-07 RX ADMIN — ASPIRIN 81 MG: 81 TABLET, COATED ORAL at 09:16

## 2021-04-07 RX ADMIN — PIPERACILLIN SODIUM AND TAZOBACTAM SODIUM 3375 MG: 3; .375 INJECTION, POWDER, LYOPHILIZED, FOR SOLUTION INTRAVENOUS at 20:28

## 2021-04-07 RX ADMIN — PIPERACILLIN SODIUM AND TAZOBACTAM SODIUM 3375 MG: 3; .375 INJECTION, POWDER, LYOPHILIZED, FOR SOLUTION INTRAVENOUS at 11:51

## 2021-04-07 RX ADMIN — SODIUM CHLORIDE, PRESERVATIVE FREE 10 ML: 5 INJECTION INTRAVENOUS at 20:29

## 2021-04-07 RX ADMIN — VANCOMYCIN HYDROCHLORIDE 1500 MG: 1 INJECTION, POWDER, LYOPHILIZED, FOR SOLUTION INTRAVENOUS at 18:35

## 2021-04-07 RX ADMIN — SODIUM CHLORIDE: 9 INJECTION, SOLUTION INTRAVENOUS at 09:13

## 2021-04-07 RX ADMIN — ENOXAPARIN SODIUM 40 MG: 40 INJECTION SUBCUTANEOUS at 09:15

## 2021-04-07 RX ADMIN — VANCOMYCIN HYDROCHLORIDE 1500 MG: 1 INJECTION, POWDER, LYOPHILIZED, FOR SOLUTION INTRAVENOUS at 09:13

## 2021-04-07 RX ADMIN — FENOFIBRATE 160 MG: 160 TABLET ORAL at 09:15

## 2021-04-07 RX ADMIN — ALBUTEROL SULFATE 2.5 MG: 2.5 SOLUTION RESPIRATORY (INHALATION) at 12:40

## 2021-04-07 RX ADMIN — VANCOMYCIN HYDROCHLORIDE 1500 MG: 1 INJECTION, POWDER, LYOPHILIZED, FOR SOLUTION INTRAVENOUS at 01:30

## 2021-04-07 RX ADMIN — POTASSIUM CHLORIDE 10 MEQ: 750 TABLET, FILM COATED, EXTENDED RELEASE ORAL at 09:15

## 2021-04-07 RX ADMIN — PIPERACILLIN SODIUM AND TAZOBACTAM SODIUM 3375 MG: 3; .375 INJECTION, POWDER, LYOPHILIZED, FOR SOLUTION INTRAVENOUS at 03:22

## 2021-04-07 RX ADMIN — ALBUTEROL SULFATE 2.5 MG: 2.5 SOLUTION RESPIRATORY (INHALATION) at 17:48

## 2021-04-07 RX ADMIN — ALBUTEROL SULFATE 2.5 MG: 2.5 SOLUTION RESPIRATORY (INHALATION) at 05:45

## 2021-04-07 ASSESSMENT — PAIN SCALES - GENERAL
PAINLEVEL_OUTOF10: 0

## 2021-04-07 ASSESSMENT — PAIN DESCRIPTION - LOCATION
LOCATION: BACK

## 2021-04-07 ASSESSMENT — PAIN DESCRIPTION - FREQUENCY: FREQUENCY: INTERMITTENT

## 2021-04-07 NOTE — PROGRESS NOTES
flush, sodium chloride, albuterol, sodium chloride flush, sodium chloride, promethazine **OR** ondansetron, polyethylene glycol, acetaminophen **OR** acetaminophen    Objective:   Vitals: /63   Pulse 88   Temp 97.9 °F (36.6 °C) (Oral)   Resp 22   Ht 6' 3\" (1.905 m)   Wt (!) 350 lb (158.8 kg)   SpO2 94%   BMI 43.75 kg/m²   BMI: Body mass index is 43.75 kg/m². CBC:   Recent Labs     04/05/21  1129 04/06/21  0510 04/07/21  0550   WBC 18.2* 15.4* 10.7   HGB 16.3 14.9 13.9    204 229     BMP:    Recent Labs     04/05/21  1129 04/06/21  0510 04/07/21  0550   * 129* 131*   K 3.8 3.9 4.3   CL 88* 91* 95*   CO2 32* 30 33*   BUN 33* 40* 23*   CREATININE 0.89 1.57* 0.77   GLUCOSE 113* 114* 113*     Hepatic:   Recent Labs     04/05/21  1129   AST 32   ALT 28   BILITOT 1.65*   ALKPHOS 90       Physical Exam:  General Appearance: alert and oriented to person, place and time, in no acute distress  Cardiovascular: normal rate, regular rhythm, normal S1 and S2  Pulmonary/Chest: clear to auscultation bilaterally,  no wheezes, rales or rhonchi, diminished air movement, no respiratory distress  Abdomen: Obese soft, non-tender, non-distended, normal bowel sounds  Extremities: s/p left AKA, right lower extremity still diffusely red and swollen, in Ace wrap  Neurological: alert, oriented, normal speech, no focal findings or movement disorder noted    Assessment and Plan:            1. Right lower extremity cellulitis - continue  IV vancomycin and Zosyn, redness slightly improved today, blood cultures pending. Patient is not asking for any pain medications. Has a history of opioid dependence/abuse in the past.  Follows up with Suboxone clinic in [place]  Suboxone is ordered for him however the patient's family did not bring it in and we are  not sure if patient has Suboxone is his possession and takes it without our knowledge.   I asked the patient to call his daughter [employer] and ask her to bring Suboxone vitals, renal function.       Electronically signed by Jame Rasheed MD on 4/7/2021 at 9:42 AM    Rounding Hospitalist

## 2021-04-07 NOTE — PLAN OF CARE
Problem: Pain:  Goal: Pain level will decrease  Description: Pain level will decrease  4/7/2021 0341 by Humberto Salinas RN  Outcome: Met This Shift     Problem: Falls - Risk of:  Goal: Will remain free from falls  Description: Will remain free from falls  4/7/2021 0958 by Manfred Mota RN  Outcome: Met This Shift  4/7/2021 0341 by Humberto Salinas RN  Outcome: Met This Shift  Goal: Absence of physical injury  Description: Absence of physical injury  4/7/2021 0958 by Manfred Mota RN  Outcome: Met This Shift  4/7/2021 0341 by Humberto Salinas RN  Outcome: Met This Shift     Problem: Daily Care:  Goal: Daily care needs are met  Description: Daily care needs are met  4/7/2021 0958 by Manfred Mota RN  Outcome: Met This Shift  4/7/2021 0341 by Humberto Salinas RN  Outcome: Met This Shift     Problem: Discharge Planning:  Goal: Patients continuum of care needs are met  Description: Patients continuum of care needs are met  Outcome: Met This Shift     Problem: Pain:  Goal: Pain level will decrease  Description: Pain level will decrease  4/7/2021 0341 by Humberto Salinas RN  Outcome: Met This Shift     Problem: Skin Integrity:  Goal: Will show no infection signs and symptoms  Description: Will show no infection signs and symptoms  Outcome: Ongoing  Goal: Absence of new skin breakdown  Description: Absence of new skin breakdown  4/7/2021 0958 by Manfred Mota RN  Outcome: Ongoing  4/7/2021 0341 by Humberto Salinas RN  Outcome: Ongoing     Problem: Skin Integrity:  Goal: Skin integrity will stabilize  Description: Skin integrity will stabilize  4/7/2021 0341 by Humberto Salinas RN  Outcome: Ongoing     Problem: Cardiac:  Goal: Cardiovascular alteration will improve  Description: Cardiovascular alteration will improve  4/7/2021 0341 by Humberto Salinas RN  Outcome: Ongoing  Goal: Ability to maintain an adequate cardiac output will improve  Description: Ability to maintain an adequate cardiac output will improve  4/7/2021 0341 by Humberto Salinas RN  Outcome: Ongoing  Goal: Hemodynamic stability will improve  4/7/2021 0341 by Toby Tellez RN  Outcome: Ongoing     Problem: Mobility - Impaired:  Goal: Mobility will improve to maximum level  Description: Mobility will improve to maximum level  4/7/2021 0341 by Toby Tellez RN  Outcome: Ongoing

## 2021-04-07 NOTE — PROGRESS NOTES
Pt heard coughing in excess, RN in room to check on pt. Pt states \"I wasn't coughing\". RN encourages deep breathing. Pt requests tylenol for lower back pain that is aching. RN returns with PRN tylenol and pt states \"I don't have pain why would I want that\". RN asks pt for clarification. Pt states \"I got indigestion I need tums\" and laughs. RN contacts Dr. Nena White, new orders received at this time.

## 2021-04-07 NOTE — PROGRESS NOTES
Foot extender on. Pt denies needs. No s/s of distress. Call light in reach. Will continue to monitor.

## 2021-04-07 NOTE — PROGRESS NOTES
Comprehensive Nutrition Assessment    Type and Reason for Visit:  Initial    Nutrition Recommendations/Plan:   continue current diet  Attach low sodium diet information to d/c      Nutrition Assessment:  Overweight/Obesity r/t excess energy intakes aeb BMI. Pt with cellulitis present. Has L AKA present. PO has been good. 20# weight loss x 1 month. Pt denied PO changes, eats \"one big meal per day. \" \"Trying to lose weight. \" Pt encouraged to eat 3 meals per day and limit sodium to < 2,000 mg  per day. Pt states he adds salt to foods. Discussed low sodium diet, attached low sodium information to d/c instructions. Malnutrition Assessment:  Malnutrition Status:  No malnutrition    Context:  Acute Illness     Findings of the 6 clinical characteristics of malnutrition:  Energy Intake:  No significant decrease in energy intake  Weight Loss:  7 - Greater than 5% over 1 month(5.4%)     Body Fat Loss:  No significant body fat loss     Muscle Mass Loss:  No significant muscle mass loss    Fluid Accumulation:  7 - Moderate to Severe Extremities(+ 3 pitting RLE, + 1 LLE)   Strength:  Not Performed      Nutrition Related Findings:  appears obese      Wounds:  None(cellulitis)       Current Nutrition Therapies:    DIET GENERAL; Anthropometric Measures:  · Height: 6' 3\" (190.5 cm)  · Current Body Weight: 350 lb (158.8 kg)   · Admission Body Weight: 352 lb 4.8 oz (159.8 kg)    · Usual Body Weight: 370 lb (167.8 kg)     · Ideal Body Weight: 196 lbs; % Ideal Body Weight 178.6 %   · BMI: 43.7  · Adjusted Body Weight: 385.4;  Amputation(L AKA)   · Adjusted BMI: 48.1    · BMI Categories: Obese Class 3 (BMI 40.0 or greater)       Nutrition Diagnosis:   · Overweight/Obese related to excessive energy intake as evidenced by BMI      Nutrition Interventions:   Food and/or Nutrient Delivery:  Continue Current Diet  Nutrition Education/Counseling:  Education initiated   Coordination of Nutrition Care:  Continue to monitor while inpatient    Goals:  PO > 75% of meals        Nutrition Monitoring and Evaluation:   Behavioral-Environmental Outcomes:  Beliefs and Attitutes   Food/Nutrient Intake Outcomes:  Food and Nutrient Intake  Physical Signs/Symptoms Outcomes:  Biochemical Data, Weight, Fluid Status or Edema     Discharge Planning:    Continue current diet     Electronically signed by Estelita Alba RD, LD on 4/7/21 at 9:50 AM EDT    Contact: [phone]

## 2021-04-07 NOTE — PROGRESS NOTES
Family brings in patient's home medication, Suboxone. Counted in front of patient x 2 RN witness, count of ten tablets. All sign controlled substance document. Markus, pharmacist, verifies medication. Patient states, \"there's more of them in my planner at home, that's not all of them. \"

## 2021-04-07 NOTE — PLAN OF CARE
Problem: Pain:  Goal: Pain level will decrease  Description: Pain level will decrease  Outcome: Met This Shift     Problem: Falls - Risk of:  Goal: Will remain free from falls  Description: Will remain free from falls  Outcome: Met This Shift  Goal: Absence of physical injury  Description: Absence of physical injury  Outcome: Met This Shift     Problem: Daily Care:  Goal: Daily care needs are met  Description: Daily care needs are met  Outcome: Met This Shift     Problem: Pain:  Goal: Pain level will decrease  Description: Pain level will decrease  Outcome: Met This Shift     Problem: Skin Integrity:  Goal: Absence of new skin breakdown  Description: Absence of new skin breakdown  Outcome: Ongoing     Problem: Skin Integrity:  Goal: Skin integrity will stabilize  Description: Skin integrity will stabilize  Outcome: Ongoing     Problem: Cardiac:  Goal: Cardiovascular alteration will improve  Description: Cardiovascular alteration will improve  Outcome: Ongoing  Goal: Ability to maintain an adequate cardiac output will improve  Description: Ability to maintain an adequate cardiac output will improve  Outcome: Ongoing  Goal: Hemodynamic stability will improve  Outcome: Ongoing     Problem: Mobility - Impaired:  Goal: Mobility will improve to maximum level  Description: Mobility will improve to maximum level  Outcome: Ongoing

## 2021-04-08 ENCOUNTER — HOSPITAL ENCOUNTER (INPATIENT)
Age: 56
LOS: 4 days | Discharge: HOME | DRG: 603 | End: 2021-04-12
Attending: INTERNAL MEDICINE | Admitting: INTERNAL MEDICINE
Payer: MEDICARE

## 2021-04-08 ENCOUNTER — APPOINTMENT (OUTPATIENT)
Dept: GENERAL RADIOLOGY | Age: 56
DRG: 603 | End: 2021-04-08
Attending: INTERNAL MEDICINE
Payer: MEDICARE

## 2021-04-08 VITALS
SYSTOLIC BLOOD PRESSURE: 131 MMHG | TEMPERATURE: 97.7 F | OXYGEN SATURATION: 95 % | HEIGHT: 75 IN | RESPIRATION RATE: 16 BRPM | HEART RATE: 86 BPM | DIASTOLIC BLOOD PRESSURE: 54 MMHG | BODY MASS INDEX: 39.17 KG/M2 | WEIGHT: 315 LBS

## 2021-04-08 DIAGNOSIS — J44.9 CHRONIC OBSTRUCTIVE PULMONARY DISEASE, UNSPECIFIED COPD TYPE (HCC): Primary | ICD-10-CM

## 2021-04-08 LAB
ABSOLUTE EOS #: 0.2 K/UL (ref 0–0.4)
ABSOLUTE IMMATURE GRANULOCYTE: ABNORMAL K/UL (ref 0–0.3)
ABSOLUTE LYMPH #: 0.7 K/UL (ref 1–4.8)
ABSOLUTE MONO #: 0.7 K/UL (ref 0–1)
ANION GAP SERPL CALCULATED.3IONS-SCNC: 3 MMOL/L (ref 9–17)
BASOPHILS # BLD: 0 % (ref 0–2)
BASOPHILS ABSOLUTE: 0 K/UL (ref 0–0.2)
BUN BLDV-MCNC: 14 MG/DL (ref 6–20)
BUN/CREAT BLD: 22 (ref 9–20)
CALCIUM SERPL-MCNC: 8.5 MG/DL (ref 8.6–10.4)
CHLORIDE BLD-SCNC: 96 MMOL/L (ref 98–107)
CO2: 35 MMOL/L (ref 20–31)
CREAT SERPL-MCNC: 0.64 MG/DL (ref 0.7–1.2)
DIFFERENTIAL TYPE: YES
EOSINOPHILS RELATIVE PERCENT: 3 % (ref 0–5)
GFR AFRICAN AMERICAN: >60 ML/MIN
GFR NON-AFRICAN AMERICAN: >60 ML/MIN
GFR SERPL CREATININE-BSD FRML MDRD: ABNORMAL ML/MIN/{1.73_M2}
GFR SERPL CREATININE-BSD FRML MDRD: ABNORMAL ML/MIN/{1.73_M2}
GLUCOSE BLD-MCNC: 94 MG/DL (ref 70–99)
HCT VFR BLD CALC: 43.1 % (ref 41–53)
HEMOGLOBIN: 13.7 G/DL (ref 13.5–17.5)
IMMATURE GRANULOCYTES: ABNORMAL %
LYMPHOCYTES # BLD: 10 % (ref 13–44)
MCH RBC QN AUTO: 25.4 PG (ref 26–34)
MCHC RBC AUTO-ENTMCNC: 31.8 G/DL (ref 31–37)
MCV RBC AUTO: 79.8 FL (ref 80–100)
MONOCYTES # BLD: 10 % (ref 5–9)
NRBC AUTOMATED: ABNORMAL PER 100 WBC
PDW BLD-RTO: 16.5 % (ref 12.1–15.2)
PLATELET # BLD: 252 K/UL (ref 140–450)
PLATELET ESTIMATE: ABNORMAL
PMV BLD AUTO: ABNORMAL FL (ref 6–12)
POTASSIUM SERPL-SCNC: 4.4 MMOL/L (ref 3.7–5.3)
RBC # BLD: 5.41 M/UL (ref 4.5–5.9)
RBC # BLD: ABNORMAL 10*6/UL
SEG NEUTROPHILS: 77 % (ref 39–75)
SEGMENTED NEUTROPHILS ABSOLUTE COUNT: 5.2 K/UL (ref 2.1–6.5)
SODIUM BLD-SCNC: 134 MMOL/L (ref 135–144)
WBC # BLD: 6.8 K/UL (ref 3.5–11)
WBC # BLD: ABNORMAL 10*3/UL

## 2021-04-08 PROCEDURE — 87040 BLOOD CULTURE FOR BACTERIA: CPT

## 2021-04-08 PROCEDURE — 02HV33Z INSERTION OF INFUSION DEVICE INTO SUPERIOR VENA CAVA, PERCUTANEOUS APPROACH: ICD-10-PCS | Performed by: INTERNAL MEDICINE

## 2021-04-08 PROCEDURE — 2580000003 HC RX 258: Performed by: EMERGENCY MEDICINE

## 2021-04-08 PROCEDURE — 94640 AIRWAY INHALATION TREATMENT: CPT

## 2021-04-08 PROCEDURE — 6370000000 HC RX 637 (ALT 250 FOR IP): Performed by: INTERNAL MEDICINE

## 2021-04-08 PROCEDURE — 6360000002 HC RX W HCPCS: Performed by: INTERNAL MEDICINE

## 2021-04-08 PROCEDURE — 36415 COLL VENOUS BLD VENIPUNCTURE: CPT

## 2021-04-08 PROCEDURE — 2580000003 HC RX 258: Performed by: INTERNAL MEDICINE

## 2021-04-08 PROCEDURE — 85025 COMPLETE CBC W/AUTO DIFF WBC: CPT

## 2021-04-08 PROCEDURE — 1200000002 HC SEMI PRIVATE SWING BED

## 2021-04-08 PROCEDURE — 2700000000 HC OXYGEN THERAPY PER DAY

## 2021-04-08 PROCEDURE — 71045 X-RAY EXAM CHEST 1 VIEW: CPT

## 2021-04-08 PROCEDURE — 36569 INSJ PICC 5 YR+ W/O IMAGING: CPT

## 2021-04-08 PROCEDURE — 94761 N-INVAS EAR/PLS OXIMETRY MLT: CPT

## 2021-04-08 PROCEDURE — 80048 BASIC METABOLIC PNL TOTAL CA: CPT

## 2021-04-08 RX ORDER — FUROSEMIDE 20 MG/1
20 TABLET ORAL EVERY OTHER DAY
Status: DISCONTINUED | OUTPATIENT
Start: 2021-04-09 | End: 2021-04-12 | Stop reason: HOSPADM

## 2021-04-08 RX ORDER — SODIUM CHLORIDE 9 MG/ML
25 INJECTION, SOLUTION INTRAVENOUS PRN
Status: DISCONTINUED | OUTPATIENT
Start: 2021-04-08 | End: 2021-04-12 | Stop reason: HOSPADM

## 2021-04-08 RX ORDER — CALCIUM CARBONATE 200(500)MG
500 TABLET,CHEWABLE ORAL 3 TIMES DAILY PRN
Status: DISCONTINUED | OUTPATIENT
Start: 2021-04-08 | End: 2021-04-12 | Stop reason: HOSPADM

## 2021-04-08 RX ORDER — SODIUM CHLORIDE 0.9 % (FLUSH) 0.9 %
10 SYRINGE (ML) INJECTION EVERY 12 HOURS SCHEDULED
Status: CANCELLED | OUTPATIENT
Start: 2021-04-08

## 2021-04-08 RX ORDER — POTASSIUM CHLORIDE 750 MG/1
10 TABLET, FILM COATED, EXTENDED RELEASE ORAL EVERY OTHER DAY
Status: DISCONTINUED | OUTPATIENT
Start: 2021-04-09 | End: 2021-04-12 | Stop reason: HOSPADM

## 2021-04-08 RX ORDER — ALBUTEROL SULFATE 2.5 MG/3ML
2.5 SOLUTION RESPIRATORY (INHALATION) EVERY 4 HOURS PRN
Status: DISCONTINUED | OUTPATIENT
Start: 2021-04-08 | End: 2021-04-12 | Stop reason: HOSPADM

## 2021-04-08 RX ORDER — POLYETHYLENE GLYCOL 3350 17 G/17G
17 POWDER, FOR SOLUTION ORAL DAILY PRN
Status: DISCONTINUED | OUTPATIENT
Start: 2021-04-08 | End: 2021-04-12 | Stop reason: HOSPADM

## 2021-04-08 RX ORDER — NICOTINE 21 MG/24HR
1 PATCH, TRANSDERMAL 24 HOURS TRANSDERMAL DAILY
Status: DISCONTINUED | OUTPATIENT
Start: 2021-04-09 | End: 2021-04-12 | Stop reason: HOSPADM

## 2021-04-08 RX ORDER — POLYETHYLENE GLYCOL 3350 17 G/17G
17 POWDER, FOR SOLUTION ORAL DAILY PRN
Status: CANCELLED | OUTPATIENT
Start: 2021-04-08

## 2021-04-08 RX ORDER — POTASSIUM CHLORIDE 750 MG/1
10 TABLET, FILM COATED, EXTENDED RELEASE ORAL EVERY OTHER DAY
Status: CANCELLED | OUTPATIENT
Start: 2021-04-09

## 2021-04-08 RX ORDER — ACETAMINOPHEN 325 MG/1
650 TABLET ORAL EVERY 6 HOURS PRN
Status: CANCELLED | OUTPATIENT
Start: 2021-04-08

## 2021-04-08 RX ORDER — ALBUTEROL SULFATE 2.5 MG/3ML
2.5 SOLUTION RESPIRATORY (INHALATION) EVERY 4 HOURS PRN
Status: CANCELLED | OUTPATIENT
Start: 2021-04-08

## 2021-04-08 RX ORDER — ASPIRIN 81 MG/1
81 TABLET ORAL DAILY
Status: DISCONTINUED | OUTPATIENT
Start: 2021-04-09 | End: 2021-04-12 | Stop reason: HOSPADM

## 2021-04-08 RX ORDER — BUPRENORPHINE HYDROCHLORIDE AND NALOXONE HYDROCHLORIDE DIHYDRATE 8; 2 MG/1; MG/1
1 TABLET SUBLINGUAL EVERY 12 HOURS
Status: DISCONTINUED | OUTPATIENT
Start: 2021-04-08 | End: 2021-04-12 | Stop reason: HOSPADM

## 2021-04-08 RX ORDER — FENOFIBRATE 160 MG/1
160 TABLET ORAL DAILY
Status: DISCONTINUED | OUTPATIENT
Start: 2021-04-09 | End: 2021-04-12 | Stop reason: HOSPADM

## 2021-04-08 RX ORDER — VANCOMYCIN HYDROCHLORIDE 500 MG/10ML
INJECTION, POWDER, LYOPHILIZED, FOR SOLUTION INTRAVENOUS
Status: DISCONTINUED
Start: 2021-04-08 | End: 2021-04-08 | Stop reason: HOSPADM

## 2021-04-08 RX ORDER — SODIUM CHLORIDE 0.9 % (FLUSH) 0.9 %
10 SYRINGE (ML) INJECTION PRN
Status: DISCONTINUED | OUTPATIENT
Start: 2021-04-08 | End: 2021-04-12 | Stop reason: HOSPADM

## 2021-04-08 RX ORDER — VANCOMYCIN HYDROCHLORIDE 1 G/20ML
INJECTION, POWDER, LYOPHILIZED, FOR SOLUTION INTRAVENOUS
Status: DISCONTINUED
Start: 2021-04-08 | End: 2021-04-08 | Stop reason: HOSPADM

## 2021-04-08 RX ORDER — FENOFIBRATE 160 MG/1
160 TABLET ORAL DAILY
Status: CANCELLED | OUTPATIENT
Start: 2021-04-08

## 2021-04-08 RX ORDER — CLOTRIMAZOLE AND BETAMETHASONE DIPROPIONATE 10; .64 MG/G; MG/G
CREAM TOPICAL 2 TIMES DAILY
Status: CANCELLED | OUTPATIENT
Start: 2021-04-08

## 2021-04-08 RX ORDER — ACETAMINOPHEN 650 MG/1
650 SUPPOSITORY RECTAL EVERY 6 HOURS PRN
Status: DISCONTINUED | OUTPATIENT
Start: 2021-04-08 | End: 2021-04-12 | Stop reason: HOSPADM

## 2021-04-08 RX ORDER — CLOTRIMAZOLE AND BETAMETHASONE DIPROPIONATE 10; .64 MG/G; MG/G
CREAM TOPICAL 2 TIMES DAILY
Status: DISCONTINUED | OUTPATIENT
Start: 2021-04-08 | End: 2021-04-12 | Stop reason: HOSPADM

## 2021-04-08 RX ORDER — PROMETHAZINE HYDROCHLORIDE 25 MG/1
12.5 TABLET ORAL EVERY 6 HOURS PRN
Status: DISCONTINUED | OUTPATIENT
Start: 2021-04-08 | End: 2021-04-12 | Stop reason: HOSPADM

## 2021-04-08 RX ORDER — ACETAMINOPHEN 650 MG/1
650 SUPPOSITORY RECTAL EVERY 6 HOURS PRN
Status: CANCELLED | OUTPATIENT
Start: 2021-04-08

## 2021-04-08 RX ORDER — BUPRENORPHINE HYDROCHLORIDE AND NALOXONE HYDROCHLORIDE DIHYDRATE 8; 2 MG/1; MG/1
1 TABLET SUBLINGUAL EVERY 12 HOURS
Status: CANCELLED | OUTPATIENT
Start: 2021-04-08

## 2021-04-08 RX ORDER — SODIUM CHLORIDE 0.9 % (FLUSH) 0.9 %
10 SYRINGE (ML) INJECTION PRN
Status: CANCELLED | OUTPATIENT
Start: 2021-04-08

## 2021-04-08 RX ORDER — FUROSEMIDE 20 MG/1
20 TABLET ORAL EVERY OTHER DAY
Status: CANCELLED | OUTPATIENT
Start: 2021-04-09

## 2021-04-08 RX ORDER — NICOTINE 21 MG/24HR
1 PATCH, TRANSDERMAL 24 HOURS TRANSDERMAL DAILY
Status: CANCELLED | OUTPATIENT
Start: 2021-04-08

## 2021-04-08 RX ORDER — CALCIUM CARBONATE 200(500)MG
500 TABLET,CHEWABLE ORAL 3 TIMES DAILY PRN
Status: CANCELLED | OUTPATIENT
Start: 2021-04-08

## 2021-04-08 RX ORDER — ONDANSETRON 2 MG/ML
4 INJECTION INTRAMUSCULAR; INTRAVENOUS EVERY 6 HOURS PRN
Status: DISCONTINUED | OUTPATIENT
Start: 2021-04-08 | End: 2021-04-12 | Stop reason: HOSPADM

## 2021-04-08 RX ORDER — SODIUM CHLORIDE 9 MG/ML
25 INJECTION, SOLUTION INTRAVENOUS PRN
Status: CANCELLED | OUTPATIENT
Start: 2021-04-08

## 2021-04-08 RX ORDER — PROMETHAZINE HYDROCHLORIDE 25 MG/1
12.5 TABLET ORAL EVERY 6 HOURS PRN
Status: CANCELLED | OUTPATIENT
Start: 2021-04-08

## 2021-04-08 RX ORDER — ASPIRIN 81 MG/1
81 TABLET ORAL DAILY
Status: CANCELLED | OUTPATIENT
Start: 2021-04-08

## 2021-04-08 RX ORDER — SODIUM CHLORIDE 0.9 % (FLUSH) 0.9 %
10 SYRINGE (ML) INJECTION EVERY 12 HOURS SCHEDULED
Status: DISCONTINUED | OUTPATIENT
Start: 2021-04-08 | End: 2021-04-12 | Stop reason: HOSPADM

## 2021-04-08 RX ORDER — ONDANSETRON 2 MG/ML
4 INJECTION INTRAMUSCULAR; INTRAVENOUS EVERY 6 HOURS PRN
Status: CANCELLED | OUTPATIENT
Start: 2021-04-08

## 2021-04-08 RX ORDER — ACETAMINOPHEN 325 MG/1
650 TABLET ORAL EVERY 6 HOURS PRN
Status: DISCONTINUED | OUTPATIENT
Start: 2021-04-08 | End: 2021-04-12 | Stop reason: HOSPADM

## 2021-04-08 RX ORDER — LACTOBACILLUS RHAMNOSUS GG 10B CELL
1 CAPSULE ORAL 2 TIMES DAILY WITH MEALS
Status: DISCONTINUED | OUTPATIENT
Start: 2021-04-08 | End: 2021-04-08 | Stop reason: HOSPADM

## 2021-04-08 RX ADMIN — BUPRENORPHINE HYDROCHLORIDE AND NALOXONE HYDROCHLORIDE DIHYDRATE 1 TABLET: 8; 2 TABLET SUBLINGUAL at 21:00

## 2021-04-08 RX ADMIN — PIPERACILLIN SODIUM AND TAZOBACTAM SODIUM 3375 MG: 3; .375 INJECTION, POWDER, LYOPHILIZED, FOR SOLUTION INTRAVENOUS at 17:33

## 2021-04-08 RX ADMIN — FENOFIBRATE 160 MG: 160 TABLET ORAL at 09:15

## 2021-04-08 RX ADMIN — BUPRENORPHINE HYDROCHLORIDE AND NALOXONE HYDROCHLORIDE DIHYDRATE 1 TABLET: 8; 2 TABLET SUBLINGUAL at 09:14

## 2021-04-08 RX ADMIN — ENOXAPARIN SODIUM 40 MG: 40 INJECTION SUBCUTANEOUS at 21:22

## 2021-04-08 RX ADMIN — METOPROLOL TARTRATE 25 MG: 25 TABLET, FILM COATED ORAL at 21:22

## 2021-04-08 RX ADMIN — ASPIRIN 81 MG: 81 TABLET, COATED ORAL at 09:16

## 2021-04-08 RX ADMIN — ALBUTEROL SULFATE 2.5 MG: 2.5 SOLUTION RESPIRATORY (INHALATION) at 05:30

## 2021-04-08 RX ADMIN — VANCOMYCIN HYDROCHLORIDE 1500 MG: 500 INJECTION, POWDER, LYOPHILIZED, FOR SOLUTION INTRAVENOUS at 17:32

## 2021-04-08 RX ADMIN — ENOXAPARIN SODIUM 40 MG: 40 INJECTION SUBCUTANEOUS at 09:15

## 2021-04-08 RX ADMIN — SODIUM CHLORIDE, PRESERVATIVE FREE 10 ML: 5 INJECTION INTRAVENOUS at 21:22

## 2021-04-08 RX ADMIN — PIPERACILLIN SODIUM AND TAZOBACTAM SODIUM 3375 MG: 3; .375 INJECTION, POWDER, LYOPHILIZED, FOR SOLUTION INTRAVENOUS at 09:16

## 2021-04-08 RX ADMIN — VANCOMYCIN HYDROCHLORIDE 1500 MG: 1 INJECTION, POWDER, LYOPHILIZED, FOR SOLUTION INTRAVENOUS at 09:16

## 2021-04-08 RX ADMIN — Medication 1 CAPSULE: at 12:27

## 2021-04-08 RX ADMIN — PIPERACILLIN SODIUM AND TAZOBACTAM SODIUM 3375 MG: 3; .375 INJECTION, POWDER, LYOPHILIZED, FOR SOLUTION INTRAVENOUS at 03:30

## 2021-04-08 RX ADMIN — VANCOMYCIN HYDROCHLORIDE 1500 MG: 1 INJECTION, POWDER, LYOPHILIZED, FOR SOLUTION INTRAVENOUS at 01:35

## 2021-04-08 RX ADMIN — ACETAMINOPHEN 650 MG: 325 TABLET, FILM COATED ORAL at 03:32

## 2021-04-08 RX ADMIN — SODIUM CHLORIDE, PRESERVATIVE FREE 10 ML: 5 INJECTION INTRAVENOUS at 09:22

## 2021-04-08 ASSESSMENT — PAIN SCALES - GENERAL
PAINLEVEL_OUTOF10: 0
PAINLEVEL_OUTOF10: 0
PAINLEVEL_OUTOF10: 6
PAINLEVEL_OUTOF10: 4
PAINLEVEL_OUTOF10: 0
PAINLEVEL_OUTOF10: 0

## 2021-04-08 ASSESSMENT — PAIN DESCRIPTION - DESCRIPTORS: DESCRIPTORS: ACHING

## 2021-04-08 ASSESSMENT — PAIN DESCRIPTION - ORIENTATION: ORIENTATION: RIGHT

## 2021-04-08 ASSESSMENT — PAIN DESCRIPTION - FREQUENCY: FREQUENCY: INTERMITTENT

## 2021-04-08 ASSESSMENT — PAIN DESCRIPTION - LOCATION: LOCATION: BACK

## 2021-04-08 NOTE — PROGRESS NOTES
How client uses the toilet room (or commode, bedpan, urinal);transfers on/off toilet, cleanses, changes pad, manages ostomy or catheter, adjusts clothes 4   EATING - How client eats and drinks (regardless of skill). Includes intake of nourishment by other means (e.g., tube feeding, total parenteral nutrition) 0   Estimated Pre-Admission ADL Value: 12     PATIENT WILL RECEIVE THE FOLLOWING SKILLED SERVICES AS A SWING BED PATIENT:       REHABILITATION (PT/OT/SP)    [] ULTRA HIGH 720 or more minutes minimum per week of at least two (2) disciplines - 1st for at least five (5) days and 2nd for at least three (3) days   [] VERY HIGH 500 or more minutes minimum per week of at least one (1) discipline for at least five (5) days   [] HIGH 325 or more minutes minimum per week of at least one (1) discipline for at least five (5) days   [] MEDIUM 150 or more minutes minimum per week at least five (5) days of any combination with three (3) therapies   [] LOW Restorative nursing at least six (6) days, two (2) activities, or therapies for at least three (3) days at least forty-five (45) minute per week minimum services. EXTENSIVE SERVICES   [] Tracheostomy Care   [] Ventilator/Respirator   [] Infection Isolation   SPECIAL CARE HIGH   [] MS with ADL greater than or equal to 10  [] Quadriplegic with ADL greater than or equal to 5  [] emphysema/COPD and shortness of breath when lying flat  [] Fever w/at least one (1) of the following: [] dehydration [] pneumonia [] vomiting [] weight loss  [] feeding tube  [] Septicemia  [] Coma (not awake & completely dependent in ADL)  [] Diabetes and injections seven (7) days and Dr. order change two (2) or more days.   [] Parenteral/IV feeding  [] respiratory therapy for seven (7) days   SPECIAL CARE LOW:   [] Respiratory Therapy  [] Ulcers (2+sites all stages) w/treatment  [] Multiple Sclerosis  [] Cerebral Palsy  [] Parkinsons Disease  [] Oxygen Therapy  [] Extensive care services w/ADL

## 2021-04-08 NOTE — PROGRESS NOTES
Pt assisted with bed bath, new gown applied, and patient slides from bed into wheelchair with leg elevated. Complete linen change provided. Pt states he sits up in a wheelchair all day at home and is what he finds more comfortable. Call light and bedside table within reach. RLE remains +3 pitting edema. Red, dry, an flaky.  Pt relates pain in RLE during ace wrapping but states he always has pain and it is no more than his usual.

## 2021-04-08 NOTE — PLAN OF CARE
Problem: Pain:  Goal: Pain level will decrease  Description: Pain level will decrease  Outcome: Ongoing  Goal: Control of acute pain  Description: Control of acute pain  Outcome: Ongoing  Goal: Control of chronic pain  Description: Control of chronic pain  Outcome: Ongoing  Goal: Patient's pain/discomfort is manageable  Description: Patient's pain/discomfort is manageable  Outcome: Ongoing     Problem: Falls - Risk of:  Goal: Will remain free from falls  Description: Will remain free from falls  Outcome: Ongoing  Goal: Absence of physical injury  Description: Absence of physical injury  Outcome: Ongoing     Problem: Skin Integrity:  Goal: Will show no infection signs and symptoms  Description: Will show no infection signs and symptoms  Outcome: Ongoing  Goal: Absence of new skin breakdown  Description: Absence of new skin breakdown  Outcome: Ongoing     Problem: Infection:  Goal: Will remain free from infection  Description: Will remain free from infection  Outcome: Ongoing     Problem: Safety:  Goal: Free from accidental physical injury  Description: Free from accidental physical injury  Outcome: Ongoing  Goal: Free from intentional harm  Description: Free from intentional harm  Outcome: Ongoing     Problem: Daily Care:  Goal: Daily care needs are met  Description: Daily care needs are met  Outcome: Ongoing     Problem: Skin Integrity:  Goal: Skin integrity will stabilize  Description: Skin integrity will stabilize  Outcome: Ongoing     Problem: Discharge Planning:  Goal: Patients continuum of care needs are met  Description: Patients continuum of care needs are met  Outcome: Ongoing     Problem: Cardiac:  Goal: Ability to maintain vital signs within normal range will improve  Description: Ability to maintain vital signs within normal range will improve  Outcome: Ongoing  Goal: Cardiovascular alteration will improve  Description: Cardiovascular alteration will improve  Outcome: Ongoing  Goal: Ability to maintain an adequate cardiac output will improve  Description: Ability to maintain an adequate cardiac output will improve  Outcome: Ongoing  Goal: Hemodynamic stability will improve  Outcome: Ongoing     Problem: Health Behavior:  Goal: Will modify at least one risk factor affecting health status  Description: Will modify at least one risk factor affecting health status  Outcome: Ongoing  Goal: Identification of resources available to assist in meeting health care needs will improve  Description: Identification of resources available to assist in meeting health care needs will improve  Outcome: Ongoing     Problem: Physical Regulation:  Goal: Complications related to the disease process, condition or treatment will be avoided or minimized  Description: Complications related to the disease process, condition or treatment will be avoided or minimized  Outcome: Ongoing     Problem: Discharge Planning:  Goal: Discharged to appropriate level of care  Description: Discharged to appropriate level of care  Outcome: Ongoing     Problem:  Body Temperature - Imbalanced:  Goal: Ability to maintain a body temperature in the normal range will improve  Description: Ability to maintain a body temperature in the normal range will improve  Outcome: Ongoing     Problem: Mobility - Impaired:  Goal: Mobility will improve to maximum level  Description: Mobility will improve to maximum level  Outcome: Ongoing     Problem: Pain:  Goal: Pain level will decrease  Description: Pain level will decrease  Outcome: Ongoing  Goal: Control of acute pain  Description: Control of acute pain  Outcome: Ongoing  Goal: Control of chronic pain  Description: Control of chronic pain  Outcome: Ongoing     Problem: Skin Integrity - Impaired:  Goal: Will show no infection signs and symptoms  Description: Will show no infection signs and symptoms  Outcome: Ongoing  Goal: Absence of new skin breakdown  Description: Absence of new skin breakdown  Outcome: Ongoing Problem:  Activity Intolerance:  Goal: Ability to tolerate increased activity will improve  Description: Ability to tolerate increased activity will improve  Outcome: Ongoing     Problem: Breathing Pattern - Ineffective:  Goal: Ability to achieve and maintain a regular respiratory rate will improve  Description: Ability to achieve and maintain a regular respiratory rate will improve  Outcome: Ongoing     Problem: Gas Exchange - Impaired:  Goal: Levels of oxygenation will improve  Description: Levels of oxygenation will improve  Outcome: Ongoing     Problem: Fluid Volume:  Goal: Ability to achieve and maintain adequate urine output will improve  Description: Ability to achieve and maintain adequate urine output will improve  Outcome: Ongoing     Problem: Respiratory:  Goal: Respiratory status will improve  Description: Respiratory status will improve  Outcome: Ongoing

## 2021-04-08 NOTE — PROGRESS NOTES
[facility] BED ORIENTATION    Patient Name: Milvia Xiong  : 1965   Gender: male   Diagnosis:  Cellulitis of lower leg [Q85.796] MRN: [id number]     [x] Goal is to provide you with very good care    [x] Insurance and Financial Responsibilities   Went over benefits and co-pays. No questions t this time. [x] Changes to Expect           - Safely encourage you to learn to care for yourself     - Frequency of Doctor's Visits       - Family Training Likely  [x] Visiting Hours:           11:00am - 8:30 pm (or by special arrangement)  [x] Personal Phone Number          Located on [employer]-Er[employer] Board   [x] Swing Bed Team Meetings         Family encouraged to attend   [x]  Clothing            Bring what you normally wear  [x]  Prevention of Falls           Put call light on, and wait until OK'd to get up on your own. [x] Therapy Expectations          Do your best to participate  [x]  Advanced Directives                          []  Pt has advanced directives and we have a copy on file                            []  Pt has advanced directives and we do not have a copy on file,  notified and will follow up. [x]   Pt does not have advanced directives.   [x] Educated on [employer]           Address provided for direct to hospital service                                                                                                                                                                                                                                                                                       Orientation Completed and agreed upon with Milvia Xiong and/or Family Members

## 2021-04-08 NOTE — PROGRESS NOTES
Pt awake in bed watching TV. Requests coffee and snack. RN offered to assist pt with bed bath and hygiene throughout night when awake, but pt declines stating he will \"in the morning\". Pt also refuses repositioning of any kind and remains in high fowlers on back. RN encourages ROM exercises, and instructs pt to change gown and shorts and wash up while RN redresses RLE. Pt states \"maybe after breakfast\".

## 2021-04-08 NOTE — PROGRESS NOTES
Dr. Hutson Keepers called regarding loss of IV access and inability to start new line, new order received for double lumen PICC.

## 2021-04-08 NOTE — PROGRESS NOTES
Pharmacy Vancomycin Consult     Vancomycin Day: 3  Current Dosing: Vancomycin 1500mg IV every 8 hours    Temp max:  98.1 F    Recent Labs     04/06/21  0510 04/07/21  0550   BUN 40* 23*   CREATININE 1.57* 0.77   WBC 15.4* 10.7       Intake/Output Summary (Last 24 hours) at 4/7/2021 2041  Last data filed at 4/7/2021 1859  Gross per 24 hour   Intake 3388 ml   Output 3900 ml   Net -512 ml     Culture Date      Source                       Results  See micro    Ht Readings from Last 1 Encounters:   04/07/21 6' 3\" (1.905 m)        Wt Readings from Last 1 Encounters:   04/05/21 (!) 350 lb (158.8 kg)       Body mass index is 43.75 kg/m². Estimated Creatinine Clearance: 177 mL/min (based on SCr of 0.77 mg/dL). Trough: 16.4    Assessment/Plan:  Trough within goal of 10-20, will continue Vancomycin 1500 mg IV every 8 hours. Timing of trough level will be determined based on culture results, renal function, and clinical response. Thank you for the consult. Will continue to follow.   [facility]  Pharmacist  [phone]

## 2021-04-08 NOTE — DISCHARGE SUMMARY
Hospitalist Discharge Summary    Patient:  Meli Cantrell  YOB: 1965    MRN: [id number]   Acct: [de-identified]    Primary Care Physician: Raisa Bridges MD    Admit date:  4/5/2021    Discharge date:  4/8/2021       Discharge Diagnoses:     1. Right lower extremity extensive cellulitis  2. Severe sepsis, resolved  3. Acute renal failure, resolved  4. Hyponatremia  5. Elevated troponin  6. COPD with hypoxia  7. Nicotine dependence  8. History of left lung lobectomy secondary to MRSA infection in 2010  9. 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 Suspected sleep apnea, patient did not complete sleep study  11. H/oh pulmonary nodule per CT chest 1/6/2020. Patient did not complete follow-up CT chest as an outpatient. Patient was unable to lay down flat for CT ordered during this admission  12. Morbid obesity  13. History of opioid abuse/dependence, on Suboxone  14.   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, s/p tricuspid valve replacement      Discharge Medications:       [facility] Medication Instructions UYB:[id number]    Printed on:04/08/21 7569   Medication Information                      albuterol (PROVENTIL) (2.5 MG/3ML) 0.083% nebulizer solution  Take 3 mLs by nebulization every 4 hours as needed for Wheezing or Shortness of Breath             albuterol sulfate HFA (VENTOLIN HFA) 108 (90 Base) MCG/ACT inhaler  Inhale 2 puffs into the lungs every 6 hours as needed for Wheezing             buprenorphine-naloxone (SUBOXONE) 8-2 MG SUBL SL tablet  DISSOLVE 1 (ONE) UNDER THE TONGUE EVERY 12 HOURS             fenofibrate (TRIGLIDE) 160 MG tablet  TAKE 1 TABLET BY MOUTH EVERY DAY             furosemide (LASIX) 20 MG tablet  Take 1 tablet by mouth every other day             lisinopril (PRINIVIL;ZESTRIL) 5 MG tablet  Take 1 tablet by mouth daily             metoprolol tartrate (LOPRESSOR) 50 MG tablet  Take 1.5 tablets by mouth 2 times daily             potassium chloride (KLOR-CON M) 10 MEQ extended release tablet  Take 1 tablet by mouth every other day             triamterene-hydroCHLOROthiazide (MAXZIDE-25) 37.5-25 MG per tablet  TAKE 1 TABLET BY MOUTH EVERY DAY                 Diet:  DIET GENERAL; Activity: As tolerates    Follow-up: With hospitalist    Consultants: Cardiology  for evaluation of elevated troponin levels      DPhysical Exam:    Vitals:  Patient Vitals for the past 24 hrs:   BP Temp Temp src Pulse Resp SpO2 Height   04/08/21 0533 -- -- -- -- -- 96 % --   04/08/21 0530 -- -- -- -- -- 96 % --   04/08/21 0044 (!) 153/91 97.8 °F (36.6 °C) Oral 82 18 92 % --   04/07/21 2146 -- -- -- -- 18 93 % --   04/07/21 2136 -- -- -- -- 18 93 % --   04/07/21 2028 (!) 139/59 98.3 °F (36.8 °C) Oral 80 20 91 % --   04/07/21 1748 -- -- -- -- -- 94 % --   04/07/21 1600 126/62 98.1 °F (36.7 °C) Oral 87 20 93 % --   04/07/21 1240 -- -- -- -- -- 93 % --   04/07/21 0945 -- -- -- -- -- -- 6' 3\" (1.905 m)   04/07/21 0745 109/63 97.9 °F (36.6 °C) Oral 88 22 94 % --     Weight: Weight: (!) 350 lb (158.8 kg)       General Appearance: alert and oriented to person, place and time, in no acute distress  Cardiovascular: normal rate, regular rhythm, normal S1 and S2  Pulmonary/Chest: clear to auscultation bilaterally,  no wheezes, rales or rhonchi, diminished air movement, no respiratory distress  Abdomen: Obese soft, non-tender, non-distended, normal bowel sounds  Extremities: s/p left AKA, right lower extremity still diffusely red and swollen, in Ace wrap  Neurological: alert, oriented, normal speech, no focal findings or movement disorder noted    Hospital Course: The patient is an 81-year-old man who presented to the emergency room for evaluation of redness, swelling and pain in the right lower extremity for 2-day duration. He has a history of recurrent cellulitis in his right lower extremity.   He also has chronic open wounds that are usually healed. Work-up in the emergency room revealed temperature of 98.6, heart rate 107, blood pressure was 104/64, oxygen saturation 99% on 2 L. BMP revealed sodium 129, potassium 3.8, chloride 88, CO2 32, BUN 33, creatinine 0.89, lactic acid was 1.6, initial troponin was 64, follow-up was 6.0 when 65 and 77. EKG revealed sinus tachycardia with rate of 102, no acute changes. WBC was elevated at 18.2, H&H was 16.3/49.2, platelets were 298. Liver function test with normal transaminase level and ALP, bilirubin elevated 1.65. COVID-19 was negative. Portable chest x-ray revealed pulmonary vascular congestion, questionable small amount of infiltrate in the right base. The patient was started on IV vancomycin and Zosyn for extensive cellulitis of the right leg. Patient's hospital course as follows:      1.  Right lower extremity cellulitis -slowly improving but due to the extent of cellulitis the patient will need to continue with  IV vancomycin and Zosyn, patient was agreeable to be discharged to swing bed to continue IV antibiotic course. Monitor renal function, electrolytes while on IV vancomycin and Zosyn  2.  Severe sepsis - resolved   3.  Acute renal failure - resolved, most likely was secondary to severe sepsis.    4.  Hyponatremia - improving, most likely due to diuretics (was on Maxide )and acute renal failure.    TSH is normal.  Urine and serum osmolalities within normal limits. 5.  Elevated troponin -patient was asymptomatic. He was evaluated by cardiologist Dr. Karan Jamison  2D echo was poor image quality, global LV function was difficult to assess but appeared to be mildly reduced with estimated EF 50 to 55%, mild LVH,, no significant pulmonary hypertension findings.   Patient most likely will need outpatient stress test due to multiple risk factors for CAD  6.  COPD with hypoxia - continue on oxygen supplement, aerosol treatments.  Patient reports that he is oxygen dependent and has oxygen at

## 2021-04-08 NOTE — PROGRESS NOTES
[facility]  Swing Bed Evaluation for Certification for [address] meets skilled criteria due to the need for skilled nursing supervision or skilled rehabilitation services listed below:   [] Therapy; physical and occupational; for decreased strength, balance and self         care activities. [] Tpn    [] Trach care   [x] IV therapy   [] Wound care    [] Other Skilled Need:        [place] lives [] Alone      [] With Spouse    [x] Other: Daughter  and plans on returning there at discharge. [x] Pt declines list of alternate providers, pt prefers to receive skilled care at [facility]  [] List of alternate providers given to patient, pt prefers to receive skilled care at [facility]  [] Not applicable, pt admitted to [facility] from [address] prefers this facility for skilled care and services can only be practically provided in a skilled nursing facility or swing bed hospital on an inpatient basis. [place] will require skilled care on a daily basis beginning 04/08/2021, if medically stable.   These services are for an ongoing condition for which [place] received inpatient care for at the hospital.        Dionicio Fernandes,      Date: 04/08/2021

## 2021-04-09 PROCEDURE — 2700000000 HC OXYGEN THERAPY PER DAY

## 2021-04-09 PROCEDURE — 6370000000 HC RX 637 (ALT 250 FOR IP): Performed by: INTERNAL MEDICINE

## 2021-04-09 PROCEDURE — 6360000002 HC RX W HCPCS: Performed by: INTERNAL MEDICINE

## 2021-04-09 PROCEDURE — 1200000002 HC SEMI PRIVATE SWING BED

## 2021-04-09 PROCEDURE — 2580000003 HC RX 258: Performed by: INTERNAL MEDICINE

## 2021-04-09 PROCEDURE — 94640 AIRWAY INHALATION TREATMENT: CPT

## 2021-04-09 PROCEDURE — 94761 N-INVAS EAR/PLS OXIMETRY MLT: CPT

## 2021-04-09 RX ADMIN — ALBUTEROL SULFATE 2.5 MG: 2.5 SOLUTION RESPIRATORY (INHALATION) at 22:11

## 2021-04-09 RX ADMIN — ENOXAPARIN SODIUM 40 MG: 40 INJECTION SUBCUTANEOUS at 21:42

## 2021-04-09 RX ADMIN — ASPIRIN 81 MG: 81 TABLET, COATED ORAL at 09:07

## 2021-04-09 RX ADMIN — PIPERACILLIN SODIUM AND TAZOBACTAM SODIUM 3375 MG: 3; .375 INJECTION, POWDER, LYOPHILIZED, FOR SOLUTION INTRAVENOUS at 20:10

## 2021-04-09 RX ADMIN — VANCOMYCIN HYDROCHLORIDE 1500 MG: 500 INJECTION, POWDER, LYOPHILIZED, FOR SOLUTION INTRAVENOUS at 17:10

## 2021-04-09 RX ADMIN — VANCOMYCIN HYDROCHLORIDE 1500 MG: 500 INJECTION, POWDER, LYOPHILIZED, FOR SOLUTION INTRAVENOUS at 00:40

## 2021-04-09 RX ADMIN — POTASSIUM CHLORIDE 10 MEQ: 750 TABLET, FILM COATED, EXTENDED RELEASE ORAL at 09:07

## 2021-04-09 RX ADMIN — CLOTRIMAZOLE AND BETAMETHASONE DIPROPIONATE: 10; .5 CREAM TOPICAL at 09:12

## 2021-04-09 RX ADMIN — SODIUM CHLORIDE, PRESERVATIVE FREE 10 ML: 5 INJECTION INTRAVENOUS at 00:44

## 2021-04-09 RX ADMIN — BUPRENORPHINE HYDROCHLORIDE AND NALOXONE HYDROCHLORIDE DIHYDRATE 1 TABLET: 8; 2 TABLET SUBLINGUAL at 09:48

## 2021-04-09 RX ADMIN — METOPROLOL TARTRATE 25 MG: 25 TABLET, FILM COATED ORAL at 09:07

## 2021-04-09 RX ADMIN — PIPERACILLIN SODIUM AND TAZOBACTAM SODIUM 3375 MG: 3; .375 INJECTION, POWDER, LYOPHILIZED, FOR SOLUTION INTRAVENOUS at 03:05

## 2021-04-09 RX ADMIN — ENOXAPARIN SODIUM 40 MG: 40 INJECTION SUBCUTANEOUS at 09:07

## 2021-04-09 RX ADMIN — FUROSEMIDE 20 MG: 20 TABLET ORAL at 09:07

## 2021-04-09 RX ADMIN — METOPROLOL TARTRATE 25 MG: 25 TABLET, FILM COATED ORAL at 21:42

## 2021-04-09 RX ADMIN — VANCOMYCIN HYDROCHLORIDE 1500 MG: 500 INJECTION, POWDER, LYOPHILIZED, FOR SOLUTION INTRAVENOUS at 09:08

## 2021-04-09 RX ADMIN — FENOFIBRATE 160 MG: 160 TABLET ORAL at 09:08

## 2021-04-09 RX ADMIN — PIPERACILLIN SODIUM AND TAZOBACTAM SODIUM 3375 MG: 3; .375 INJECTION, POWDER, LYOPHILIZED, FOR SOLUTION INTRAVENOUS at 11:55

## 2021-04-09 RX ADMIN — SODIUM CHLORIDE, PRESERVATIVE FREE 10 ML: 5 INJECTION INTRAVENOUS at 09:09

## 2021-04-09 RX ADMIN — BUPRENORPHINE HYDROCHLORIDE AND NALOXONE HYDROCHLORIDE DIHYDRATE 1 TABLET: 8; 2 TABLET SUBLINGUAL at 21:45

## 2021-04-09 ASSESSMENT — PAIN SCALES - GENERAL
PAINLEVEL_OUTOF10: 0
PAINLEVEL_OUTOF10: 0

## 2021-04-09 ASSESSMENT — PAIN DESCRIPTION - PAIN TYPE: TYPE: CHRONIC PAIN

## 2021-04-09 NOTE — PROGRESS NOTES
energy intake as evidenced by BMI    Lab Results   Component Value Date     (L) 04/08/2021    K 4.4 04/08/2021    CL 96 (L) 04/08/2021    CO2 35 (H) 04/08/2021    BUN 14 04/08/2021    CREATININE 0.64 (L) 04/08/2021    GLUCOSE 94 04/08/2021    CALCIUM 8.5 (L) 04/08/2021    PROT 8.0 04/05/2021    LABALBU 3.2 (L) 04/05/2021    BILITOT 1.65 (H) 04/05/2021    ALKPHOS 90 04/05/2021    AST 32 04/05/2021    ALT 28 04/05/2021    LABGLOM >60 04/08/2021    GFRAA >60 04/08/2021    GLOB NOT REPORTED 05/22/2015     No results found for: LABA1C  No results found for: EAG  No results found for: VITD25    Nutrition Interventions:   Food and/or Nutrient Delivery:  Continue Current Diet  Nutrition Education/Counseling:  Education initiated   Coordination of Nutrition Care:  Continue to monitor while inpatient    Goals:  PO >75% meals with lower sodium use       Nutrition Monitoring and Evaluation:   Behavioral-Environmental Outcomes:  Knowledge or Skill   Food/Nutrient Intake Outcomes:  Food and Nutrient Intake  Physical Signs/Symptoms Outcomes:  Fluid Status or Edema, Biochemical Data, Weight     Discharge Planning:    Continue current diet     Electronically signed by Ade Cody RD, LD on 4/9/21 at 12:13 PM EDT    Contact: [phone]

## 2021-04-09 NOTE — H&P
History & Physical    Patient:  Karol Castillo  YOB: 1965  Date of Service: 4/9/2021  MRN: [id number]   Acct:   [de-identified]   Primary Care Physician: Flavio Hayes MD    Chief Complaint: Right lower extremity redness, swelling and pain    History of Present Illness: The patient is an 52-year-old man who presented to the emergency room for evaluation of redness, swelling and pain in the right lower extremity for 2-day duration. He has a history of recurrent cellulitis in his right lower extremity. He also has chronic open wounds that are usually dry, nondraining. Work-up in the emergency room revealed temperature of 98.6, heart rate 107, blood pressure was 104/64, oxygen saturation 99% on 2 L.  BMP revealed sodium 129, potassium 3.8, chloride 88, CO2 32, BUN 33, creatinine 0.89, lactic acid was 1.6, initial troponin was 64, follow-up was 6.0 when 65 and 77.  EKG revealed sinus tachycardia with rate of 102, no acute changes.  WBC was elevated at 18.2, H&H was 16.3/49.2, platelets were 141.  Liver function test with normal transaminase level and ALP, bilirubin elevated 1.65.  COVID-19 was negative.  Portable chest x-ray revealed pulmonary vascular congestion, questionable small amount of infiltrate in the right base.  The patient was s admitted to hospital and tarted on IV vancomycin and Zosyn for extensive cellulitis of the right leg. He was slowly improving, leukocytosis resolved, remained afebrile. Due to the extent of infection and his comorbidities we decided to continue with IV antibiotics for several days. Patient was agreeable to be discharged to swing bed to continue with IV antibiotics.   Since he was clinically stable he was admitted to swing bed 4/8/21                Past Medical History:        Diagnosis Date    Addiction to drug [facility])     CAD (coronary artery disease)     COPD (chronic obstructive pulmonary disease) (HCC)     Hypertension     Kidney failure, acute he does not drink alcohol. Family History:       Problem Relation Age of Onset    Cancer Mother        Review of systems:  Constitutional: no fever, no night sweats, no fatigue  Head: no headache, no head injury, no migranes. Eye: no blurring of vision, no double vision. Ears: no hearing difficulty, no tinnitus  Mouth/throat: no sore throat  Lungs: no cough, positive for shortness of breath, no wheeze  CVS: no palpitation, no chest pain  GI: no abdominal pain, no nausea , no vomiting, no constipation  RADHA: no dysuria, frequency and urgency  Musculoskeletal: Positive for chronic low back pain  Endocrine: no polyuria, polydypsia, no cold or heat intolerence  Hematology: no anemia, no easy brusing or bleeding  Dermatology: Positive for redness, swelling right lower extremity  Psychiatry: no depression, no anxiety  Neurology: no syncope, no seizures, no numbness or tingling of hands, no numbness or tingling of feet, no paresis         Vitals:   Vitals:    04/09/21 0549   BP:  143/72   Pulse:  85   Resp:  20   Temp:  97.6   SpO2: 97%      BMI: Body mass index is 43.75 kg/m².     Physical Exam:  General Appearance: alert and oriented to person, place and time, in no acute distress  Cardiovascular: normal rate, regular rhythm, normal S1 and S2, no murmurs  Pulmonary/Chest: clear to auscultation bilaterally- no wheezes, rales or rhonchi, normal air movement, no respiratory distress  Abdomen: Obese, soft, non-tender, non-distended, normal bowel sounds  Extremities s/p left AKA, right lower extremity diffusely red, in ACE wrap  Skin: warm and dry, no rash or erythema  Head: normocephalic and atraumatic  Eyes: pupils equal, round, and reactive to light  Neck: supple and non-tender without mass, no thyromegaly   Neurological: alert, oriented, normal speech, no focal findings or movement disorder noted    Xr Chest Portable    Result Date: 4/8/2021  EXAM: XR CHEST PORTABLE 4/8/2021 11:01 PM EDT [facility] CLINICAL STATEMENT: Reason for exam:->PICC placement  COMPARISON: 4/5/2021 TECHNIQUE: Single AP radiograph of the chest is submitted. FINDINGS: Right PICC line tip in the superior vena cava. There is pulmonary vascular congestion with left upper and bilateral lower lobe airspace disease. Decreased lung volume. Poststernotomy. The cardiac silhouette is normal. Atherosclerotic aortic calcifications. Mild left pleural effusion. No pneumothorax. The bony elements are unremarkable. Right PICC line tip in the superior vena cava. Pulmonary vascular congestion with left upper and bilateral lower lobe airspace disease. Decreased lung volume. Mild left pleural effusion. FOLLOW-UP: Follow-up as clinically indicated. Assessment/ Plan:    1.  Right lower extremity cellulitis - continue  IV vancomycin and Zosyn, redness slowly improving. Blood cultures showed no growth. 2.  Severe sepsis - resolved, patient's blood pressure improved, lactic acid level normalized, leukocytosis resolved, he is afebrile. 3.  Acute renal failure -resolved, most likely was secondary to severe sepsis.    4.  Hyponatremia - improved, most likely due to diuretics (was on Maxide )and acute renal failure.    TSH is normal.  Urine and serum osmolalities within normal limits. 5.  Elevated troponin - trended down, appreciate 's input .  2D echo was poor image quality, global LV function was difficult to assess but appeared to be mildly reduced with estimated EF 50 to 55%, mild LVH,, no significant pulmonary hypertension findings. Patient most likely will need outpatient stress test due to multiple risk factors for CAD  6.  COPD with hypoxia - continue on oxygen supplement, aerosol treatments.  Patient reports that he is oxygen dependent and has oxygen at home.   7.  Nicotine dependence  - on nicotine patch  8.  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  History of left lung lobectomy secondary to MRSA infection in

## 2021-04-09 NOTE — PLAN OF CARE
Problem: Skin Integrity:  Goal: Will show no infection signs and symptoms  Description: Will show no infection signs and symptoms  4/9/2021 1230 by Junie Burgess RN  Outcome: Ongoing  4/9/2021 0224 by Karma Hyde RN  Outcome: Ongoing  Goal: Absence of new skin breakdown  Description: Absence of new skin breakdown  4/9/2021 1230 by Junie Burgess RN  Outcome: Ongoing  4/9/2021 0224 by Karma Hyde RN  Outcome: Ongoing     Problem: Falls - Risk of:  Goal: Will remain free from falls  Description: Will remain free from falls  4/9/2021 1230 by Junie Burgess RN  Outcome: Ongoing  4/9/2021 0224 by Karma Hyde RN  Outcome: Ongoing  Goal: Absence of physical injury  Description: Absence of physical injury  4/9/2021 0224 by Karma Hyde RN  Outcome: Ongoing     Problem: Discharge Planning:  Goal: Discharged to appropriate level of care  Description: Discharged to appropriate level of care  4/9/2021 1230 by Junie Burgess RN  Outcome: Ongoing  4/9/2021 0224 by Karma Hyde RN  Outcome: Ongoing     Problem:  Body Temperature - Imbalanced:  Goal: Ability to maintain a body temperature in the normal range will improve  Description: Ability to maintain a body temperature in the normal range will improve  4/9/2021 0224 by Karma Hyde RN  Outcome: Ongoing     Problem: Mobility - Impaired:  Goal: Mobility will improve to maximum level  Description: Mobility will improve to maximum level  4/9/2021 1230 by Junie Burgess RN  Outcome: Ongoing  4/9/2021 0224 by Karma Hyde RN  Outcome: Ongoing     Problem: Pain:  Goal: Pain level will decrease  Description: Pain level will decrease  4/9/2021 1230 by Junie Burgess RN  Outcome: Ongoing  4/9/2021 0224 by Karma Hyde RN  Outcome: Ongoing  Goal: Control of acute pain  Description: Control of acute pain  4/9/2021 1230 by Junie Burgess RN  Outcome: Ongoing  4/9/2021 0224 by Karma Hyde RN  Outcome: Ongoing  Goal: Control of chronic pain  Description: Control of chronic pain  4/9/2021 0224 by Krystian Goodrich RN  Outcome: Ongoing     Problem: Skin Integrity - Impaired:  Goal: Will show no infection signs and symptoms  Description: Will show no infection signs and symptoms  4/9/2021 0224 by Krystian Goodrich RN  Outcome: Ongoing  Goal: Absence of new skin breakdown  Description: Absence of new skin breakdown  4/9/2021 0224 by Krystian Goodrich RN  Outcome: Ongoing     Problem: Pain:  Goal: Pain level will decrease  Description: Pain level will decrease  4/9/2021 1230 by Chago Simpson RN  Outcome: Ongoing  4/9/2021 0224 by Krystian Goodrich RN  Outcome: Ongoing  Goal: Control of chronic pain  Description: Control of chronic pain  Outcome: Ongoing     Problem: Nutrition  Goal: Optimal nutrition therapy  4/9/2021 1214 by Maryan Hogue, RD, LD  Outcome: Ongoing  Note: Nutrition Problem #1: Overweight/Obese  Intervention: Food and/or Nutrient Delivery: Continue Current Diet  Nutritional Goals: PO >75% meals with lower sodium use

## 2021-04-09 NOTE — PROGRESS NOTES
SWINGBED PATIENT ACTIVITY PROGRAM ASSESSMENT    Patient Name: Valentino Pontiff  : 1965   Gender: male   Diagnosis:  Cellulitis [L03.90]  Cellulitis of right leg [E78.532] MRN: [id number]     Ability to read or write? [x] Yes   [] No  Ability to hear? [x] Yes   [] No  Is patient confused? [] Yes   [x] No    Enter Codes in Boxes Codin. Very Important  2. Somewhat important  3. Not very important  4. Not important at all  5. Important but cant do or no choice  6. No response or non-responsive   1 A. How important is it to you to have books, newspapers, and magazines to read?   2 B. How important is it to you to listen to music you like?   2 C. How important is it to you to be around animals such as pets?   1 D. How important is it to you to keep up with the news?   2 E. How important is it to you to do things with groups of people?   1 F. How important is it to you to do your favorite activities?   1 G. How important is it to you to go outside to get fresh air when the weather is good?   2 H. How important is it to you to participate in Bahai services or practices? Activity Care Plan: Will participate in activity programs of choice daily with no decline throughout [facility] stay.           EVALUATORS SIGNATURE:  Shahbaz Hudson  Date: 2021

## 2021-04-09 NOTE — PROGRESS NOTES
Access RN arrived for PICC placement. 2240: Access RN states PICC is ready for use. CXR ordered to confirm placement.

## 2021-04-09 NOTE — PLAN OF CARE
Problem: Skin Integrity:  Goal: Will show no infection signs and symptoms  Description: Will show no infection signs and symptoms  4/9/2021 0224 by Sherren Batman, RN  Outcome: Ongoing  4/8/2021 1418 by Kostas Murguia RN  Outcome: Ongoing  Goal: Absence of new skin breakdown  Description: Absence of new skin breakdown  4/9/2021 0224 by Sherren Batman, RN  Outcome: Ongoing  4/8/2021 1418 by Kostas Murguia RN  Outcome: Ongoing     Problem: Falls - Risk of:  Goal: Will remain free from falls  Description: Will remain free from falls  Outcome: Ongoing  Goal: Absence of physical injury  Description: Absence of physical injury  Outcome: Ongoing     Problem: Discharge Planning:  Goal: Discharged to appropriate level of care  Description: Discharged to appropriate level of care  Outcome: Ongoing     Problem:  Body Temperature - Imbalanced:  Goal: Ability to maintain a body temperature in the normal range will improve  Description: Ability to maintain a body temperature in the normal range will improve  Outcome: Ongoing     Problem: Mobility - Impaired:  Goal: Mobility will improve to maximum level  Description: Mobility will improve to maximum level  Outcome: Ongoing     Problem: Pain:  Goal: Pain level will decrease  Description: Pain level will decrease  Outcome: Ongoing  Goal: Control of acute pain  Description: Control of acute pain  Outcome: Ongoing  Goal: Control of chronic pain  Description: Control of chronic pain  Outcome: Ongoing     Problem: Skin Integrity - Impaired:  Goal: Will show no infection signs and symptoms  Description: Will show no infection signs and symptoms  Outcome: Ongoing  Goal: Absence of new skin breakdown  Description: Absence of new skin breakdown  Outcome: Ongoing

## 2021-04-09 NOTE — PROGRESS NOTES
Pt sitting in bed. Right lower leg is wrapped in ace wrap. Redness has subsided slightly from drawn line.

## 2021-04-10 LAB
ALBUMIN SERPL-MCNC: 2.4 G/DL (ref 3.5–5.2)
ANION GAP SERPL CALCULATED.3IONS-SCNC: 5 MMOL/L (ref 9–17)
BUN BLDV-MCNC: 11 MG/DL (ref 6–20)
BUN/CREAT BLD: 17 (ref 9–20)
CALCIUM SERPL-MCNC: 8.6 MG/DL (ref 8.6–10.4)
CHLORIDE BLD-SCNC: 94 MMOL/L (ref 98–107)
CO2: 33 MMOL/L (ref 20–31)
CREAT SERPL-MCNC: 0.65 MG/DL (ref 0.7–1.2)
GFR AFRICAN AMERICAN: >60 ML/MIN
GFR NON-AFRICAN AMERICAN: >60 ML/MIN
GFR SERPL CREATININE-BSD FRML MDRD: ABNORMAL ML/MIN/{1.73_M2}
GFR SERPL CREATININE-BSD FRML MDRD: ABNORMAL ML/MIN/{1.73_M2}
GLUCOSE BLD-MCNC: 185 MG/DL (ref 70–99)
PHOSPHORUS: 3 MG/DL (ref 2.5–4.5)
POTASSIUM SERPL-SCNC: 4.2 MMOL/L (ref 3.7–5.3)
SODIUM BLD-SCNC: 132 MMOL/L (ref 135–144)

## 2021-04-10 PROCEDURE — 6360000002 HC RX W HCPCS: Performed by: INTERNAL MEDICINE

## 2021-04-10 PROCEDURE — 2580000003 HC RX 258: Performed by: INTERNAL MEDICINE

## 2021-04-10 PROCEDURE — 36415 COLL VENOUS BLD VENIPUNCTURE: CPT

## 2021-04-10 PROCEDURE — 6370000000 HC RX 637 (ALT 250 FOR IP): Performed by: INTERNAL MEDICINE

## 2021-04-10 PROCEDURE — 80069 RENAL FUNCTION PANEL: CPT

## 2021-04-10 PROCEDURE — 2700000000 HC OXYGEN THERAPY PER DAY

## 2021-04-10 PROCEDURE — 94640 AIRWAY INHALATION TREATMENT: CPT

## 2021-04-10 PROCEDURE — 94761 N-INVAS EAR/PLS OXIMETRY MLT: CPT

## 2021-04-10 PROCEDURE — 1200000002 HC SEMI PRIVATE SWING BED

## 2021-04-10 RX ORDER — VANCOMYCIN HYDROCHLORIDE 500 MG/10ML
INJECTION, POWDER, LYOPHILIZED, FOR SOLUTION INTRAVENOUS
Status: DISPENSED
Start: 2021-04-10 | End: 2021-04-11

## 2021-04-10 RX ORDER — VANCOMYCIN HYDROCHLORIDE 1 G/20ML
INJECTION, POWDER, LYOPHILIZED, FOR SOLUTION INTRAVENOUS
Status: DISPENSED
Start: 2021-04-10 | End: 2021-04-10

## 2021-04-10 RX ORDER — VANCOMYCIN HYDROCHLORIDE 500 MG/10ML
INJECTION, POWDER, LYOPHILIZED, FOR SOLUTION INTRAVENOUS
Status: DISPENSED
Start: 2021-04-10 | End: 2021-04-10

## 2021-04-10 RX ORDER — VANCOMYCIN HYDROCHLORIDE 1 G/20ML
INJECTION, POWDER, LYOPHILIZED, FOR SOLUTION INTRAVENOUS
Status: DISPENSED
Start: 2021-04-10 | End: 2021-04-11

## 2021-04-10 RX ORDER — POLYETHYLENE GLYCOL 3350 17 G/17G
17 POWDER, FOR SOLUTION ORAL DAILY
Status: DISCONTINUED | OUTPATIENT
Start: 2021-04-10 | End: 2021-04-12 | Stop reason: HOSPADM

## 2021-04-10 RX ADMIN — VANCOMYCIN HYDROCHLORIDE 1500 MG: 500 INJECTION, POWDER, LYOPHILIZED, FOR SOLUTION INTRAVENOUS at 00:34

## 2021-04-10 RX ADMIN — PIPERACILLIN SODIUM AND TAZOBACTAM SODIUM 3375 MG: 3; .375 INJECTION, POWDER, LYOPHILIZED, FOR SOLUTION INTRAVENOUS at 03:16

## 2021-04-10 RX ADMIN — VANCOMYCIN HYDROCHLORIDE 1500 MG: 500 INJECTION, POWDER, LYOPHILIZED, FOR SOLUTION INTRAVENOUS at 17:37

## 2021-04-10 RX ADMIN — PIPERACILLIN SODIUM AND TAZOBACTAM SODIUM 3375 MG: 3; .375 INJECTION, POWDER, LYOPHILIZED, FOR SOLUTION INTRAVENOUS at 11:28

## 2021-04-10 RX ADMIN — ALBUTEROL SULFATE 2.5 MG: 2.5 SOLUTION RESPIRATORY (INHALATION) at 05:41

## 2021-04-10 RX ADMIN — ENOXAPARIN SODIUM 40 MG: 40 INJECTION SUBCUTANEOUS at 21:42

## 2021-04-10 RX ADMIN — ENOXAPARIN SODIUM 40 MG: 40 INJECTION SUBCUTANEOUS at 08:40

## 2021-04-10 RX ADMIN — SODIUM CHLORIDE, PRESERVATIVE FREE 10 ML: 5 INJECTION INTRAVENOUS at 08:42

## 2021-04-10 RX ADMIN — METOPROLOL TARTRATE 25 MG: 25 TABLET, FILM COATED ORAL at 21:43

## 2021-04-10 RX ADMIN — SODIUM CHLORIDE, PRESERVATIVE FREE 10 ML: 5 INJECTION INTRAVENOUS at 17:38

## 2021-04-10 RX ADMIN — METOPROLOL TARTRATE 25 MG: 25 TABLET, FILM COATED ORAL at 08:42

## 2021-04-10 RX ADMIN — VANCOMYCIN HYDROCHLORIDE 1500 MG: 500 INJECTION, POWDER, LYOPHILIZED, FOR SOLUTION INTRAVENOUS at 09:41

## 2021-04-10 RX ADMIN — SODIUM CHLORIDE, PRESERVATIVE FREE 10 ML: 5 INJECTION INTRAVENOUS at 21:43

## 2021-04-10 RX ADMIN — POLYETHYLENE GLYCOL 3350 17 G: 17 POWDER, FOR SOLUTION ORAL at 09:46

## 2021-04-10 RX ADMIN — PIPERACILLIN SODIUM AND TAZOBACTAM SODIUM 3375 MG: 3; .375 INJECTION, POWDER, LYOPHILIZED, FOR SOLUTION INTRAVENOUS at 19:55

## 2021-04-10 RX ADMIN — BUPRENORPHINE HYDROCHLORIDE AND NALOXONE HYDROCHLORIDE DIHYDRATE 1 TABLET: 8; 2 TABLET SUBLINGUAL at 21:43

## 2021-04-10 RX ADMIN — CLOTRIMAZOLE AND BETAMETHASONE DIPROPIONATE: 10; .5 CREAM TOPICAL at 08:41

## 2021-04-10 RX ADMIN — BUPRENORPHINE HYDROCHLORIDE AND NALOXONE HYDROCHLORIDE DIHYDRATE 1 TABLET: 8; 2 TABLET SUBLINGUAL at 08:41

## 2021-04-10 RX ADMIN — FENOFIBRATE 160 MG: 160 TABLET ORAL at 08:42

## 2021-04-10 RX ADMIN — ASPIRIN 81 MG: 81 TABLET, COATED ORAL at 08:41

## 2021-04-10 ASSESSMENT — PAIN SCALES - GENERAL
PAINLEVEL_OUTOF10: 0
PAINLEVEL_OUTOF10: 0

## 2021-04-10 NOTE — PROGRESS NOTES
Pt requests ACE wrap be removed at this time for a break and to let leg \"air out\". RN removes, washes and applies lotion for the itchiness that pt complaining of. RN encourages pt to elevate extremity of a pillow but pt refuses despite education on benefits. Will continue to monitor.

## 2021-04-10 NOTE — PLAN OF CARE
Problem: Skin Integrity:  Goal: Will show no infection signs and symptoms  Description: Will show no infection signs and symptoms  Outcome: Ongoing  Goal: Absence of new skin breakdown  Description: Absence of new skin breakdown  Outcome: Ongoing     Problem: Falls - Risk of:  Goal: Will remain free from falls  Description: Will remain free from falls  4/10/2021 1340 by Agustin Loo RN  Outcome: Ongoing  4/10/2021 0334 by Reena Ramos RN  Outcome: Ongoing  Goal: Absence of physical injury  Description: Absence of physical injury  Outcome: Ongoing     Problem: Discharge Planning:  Goal: Discharged to appropriate level of care  Description: Discharged to appropriate level of care  4/10/2021 1340 by Agsutin Loo RN  Outcome: Ongoing  4/10/2021 0334 by Reena Ramos RN  Outcome: Ongoing     Problem:  Body Temperature - Imbalanced:  Goal: Ability to maintain a body temperature in the normal range will improve  Description: Ability to maintain a body temperature in the normal range will improve  4/10/2021 1340 by Agustin Loo RN  Outcome: Ongoing  4/10/2021 0334 by Reena Ramos RN  Outcome: Ongoing     Problem: Mobility - Impaired:  Goal: Mobility will improve to maximum level  Description: Mobility will improve to maximum level  4/10/2021 1340 by Agustin Loo RN  Outcome: Ongoing  4/10/2021 0334 by Reena Ramos RN  Outcome: Ongoing     Problem: Pain:  Goal: Pain level will decrease  Description: Pain level will decrease  Outcome: Ongoing  Goal: Control of acute pain  Description: Control of acute pain  Outcome: Ongoing  Goal: Control of chronic pain  Description: Control of chronic pain  Outcome: Ongoing     Problem: Skin Integrity - Impaired:  Goal: Will show no infection signs and symptoms  Description: Will show no infection signs and symptoms  Outcome: Ongoing  Goal: Absence of new skin breakdown  Description: Absence of new skin breakdown  Outcome: Ongoing     Problem: Pain:  Goal: Pain level will decrease  Description: Pain level will decrease  Outcome: Ongoing  Goal: Control of acute pain  Description: Control of acute pain  Outcome: Ongoing  Goal: Control of chronic pain  Description: Control of chronic pain  Outcome: Ongoing     Problem: Nutrition  Goal: Optimal nutrition therapy  Outcome: Ongoing

## 2021-04-11 PROCEDURE — 2580000003 HC RX 258: Performed by: INTERNAL MEDICINE

## 2021-04-11 PROCEDURE — 6370000000 HC RX 637 (ALT 250 FOR IP): Performed by: INTERNAL MEDICINE

## 2021-04-11 PROCEDURE — 2700000000 HC OXYGEN THERAPY PER DAY

## 2021-04-11 PROCEDURE — 94640 AIRWAY INHALATION TREATMENT: CPT

## 2021-04-11 PROCEDURE — 6360000002 HC RX W HCPCS: Performed by: INTERNAL MEDICINE

## 2021-04-11 PROCEDURE — 1200000002 HC SEMI PRIVATE SWING BED

## 2021-04-11 PROCEDURE — 94761 N-INVAS EAR/PLS OXIMETRY MLT: CPT

## 2021-04-11 RX ORDER — VANCOMYCIN HYDROCHLORIDE 1 G/20ML
INJECTION, POWDER, LYOPHILIZED, FOR SOLUTION INTRAVENOUS
Status: DISPENSED
Start: 2021-04-11 | End: 2021-04-11

## 2021-04-11 RX ORDER — VANCOMYCIN HYDROCHLORIDE 500 MG/10ML
INJECTION, POWDER, LYOPHILIZED, FOR SOLUTION INTRAVENOUS
Status: DISPENSED
Start: 2021-04-11 | End: 2021-04-12

## 2021-04-11 RX ORDER — VANCOMYCIN HYDROCHLORIDE 1 G/20ML
INJECTION, POWDER, LYOPHILIZED, FOR SOLUTION INTRAVENOUS
Status: DISPENSED
Start: 2021-04-11 | End: 2021-04-12

## 2021-04-11 RX ORDER — VANCOMYCIN HYDROCHLORIDE 500 MG/10ML
INJECTION, POWDER, LYOPHILIZED, FOR SOLUTION INTRAVENOUS
Status: DISPENSED
Start: 2021-04-11 | End: 2021-04-11

## 2021-04-11 RX ADMIN — VANCOMYCIN HYDROCHLORIDE 1500 MG: 500 INJECTION, POWDER, LYOPHILIZED, FOR SOLUTION INTRAVENOUS at 00:35

## 2021-04-11 RX ADMIN — POTASSIUM CHLORIDE 10 MEQ: 750 TABLET, FILM COATED, EXTENDED RELEASE ORAL at 09:04

## 2021-04-11 RX ADMIN — ENOXAPARIN SODIUM 40 MG: 40 INJECTION SUBCUTANEOUS at 09:02

## 2021-04-11 RX ADMIN — ASPIRIN 81 MG: 81 TABLET, COATED ORAL at 09:04

## 2021-04-11 RX ADMIN — ENOXAPARIN SODIUM 40 MG: 40 INJECTION SUBCUTANEOUS at 21:47

## 2021-04-11 RX ADMIN — METOPROLOL TARTRATE 25 MG: 25 TABLET, FILM COATED ORAL at 21:47

## 2021-04-11 RX ADMIN — BUPRENORPHINE HYDROCHLORIDE AND NALOXONE HYDROCHLORIDE DIHYDRATE 1 TABLET: 8; 2 TABLET SUBLINGUAL at 21:47

## 2021-04-11 RX ADMIN — FENOFIBRATE 160 MG: 160 TABLET ORAL at 09:04

## 2021-04-11 RX ADMIN — PIPERACILLIN SODIUM AND TAZOBACTAM SODIUM 3375 MG: 3; .375 INJECTION, POWDER, LYOPHILIZED, FOR SOLUTION INTRAVENOUS at 19:12

## 2021-04-11 RX ADMIN — PIPERACILLIN SODIUM AND TAZOBACTAM SODIUM 3375 MG: 3; .375 INJECTION, POWDER, LYOPHILIZED, FOR SOLUTION INTRAVENOUS at 11:42

## 2021-04-11 RX ADMIN — BUPRENORPHINE HYDROCHLORIDE AND NALOXONE HYDROCHLORIDE DIHYDRATE 1 TABLET: 8; 2 TABLET SUBLINGUAL at 09:07

## 2021-04-11 RX ADMIN — METOPROLOL TARTRATE 25 MG: 25 TABLET, FILM COATED ORAL at 09:05

## 2021-04-11 RX ADMIN — VANCOMYCIN HYDROCHLORIDE 1500 MG: 500 INJECTION, POWDER, LYOPHILIZED, FOR SOLUTION INTRAVENOUS at 09:57

## 2021-04-11 RX ADMIN — PIPERACILLIN SODIUM AND TAZOBACTAM SODIUM 3375 MG: 3; .375 INJECTION, POWDER, LYOPHILIZED, FOR SOLUTION INTRAVENOUS at 03:13

## 2021-04-11 RX ADMIN — VANCOMYCIN HYDROCHLORIDE 1500 MG: 500 INJECTION, POWDER, LYOPHILIZED, FOR SOLUTION INTRAVENOUS at 19:11

## 2021-04-11 RX ADMIN — SODIUM CHLORIDE, PRESERVATIVE FREE 10 ML: 5 INJECTION INTRAVENOUS at 09:58

## 2021-04-11 RX ADMIN — SODIUM CHLORIDE, PRESERVATIVE FREE 10 ML: 5 INJECTION INTRAVENOUS at 09:02

## 2021-04-11 RX ADMIN — CLOTRIMAZOLE AND BETAMETHASONE DIPROPIONATE: 10; .5 CREAM TOPICAL at 11:37

## 2021-04-11 RX ADMIN — FUROSEMIDE 20 MG: 20 TABLET ORAL at 09:04

## 2021-04-11 ASSESSMENT — PAIN SCALES - GENERAL
PAINLEVEL_OUTOF10: 0

## 2021-04-11 NOTE — PLAN OF CARE
Problem: Skin Integrity:  Goal: Will show no infection signs and symptoms  Description: Will show no infection signs and symptoms  Outcome: Met This Shift  Goal: Absence of new skin breakdown  Description: Absence of new skin breakdown  Outcome: Met This Shift     Problem: Falls - Risk of:  Goal: Will remain free from falls  Description: Will remain free from falls  Outcome: Met This Shift  Goal: Absence of physical injury  Description: Absence of physical injury  Outcome: Met This Shift     Problem: Body Temperature - Imbalanced:  Goal: Ability to maintain a body temperature in the normal range will improve  Description: Ability to maintain a body temperature in the normal range will improve  Outcome: Met This Shift     Problem: Pain:  Description: Pain management should include both nonpharmacologic and pharmacologic interventions. Goal: Pain level will decrease  Description: Pain level will decrease  Outcome: Met This Shift  Goal: Control of acute pain  Description: Control of acute pain  Outcome: Met This Shift  Goal: Control of chronic pain  Description: Control of chronic pain  Outcome: Met This Shift     Problem: Skin Integrity - Impaired:  Goal: Will show no infection signs and symptoms  Description: Will show no infection signs and symptoms  Outcome: Met This Shift  Goal: Absence of new skin breakdown  Description: Absence of new skin breakdown  Outcome: Met This Shift     Problem: Pain:  Description: Pain management should include both nonpharmacologic and pharmacologic interventions.   Goal: Pain level will decrease  Description: Pain level will decrease  Outcome: Met This Shift  Goal: Control of acute pain  Description: Control of acute pain  Outcome: Met This Shift     Problem: Mobility - Impaired:  Goal: Mobility will improve to maximum level  Description: Mobility will improve to maximum level  Outcome: Ongoing     Problem: Discharge Planning:  Goal: Discharged to appropriate level of care  Description: Discharged to appropriate level of care  Outcome: Not Met This Shift

## 2021-04-11 NOTE — PROGRESS NOTES
Hospitalist Progress Note  4/11/2021 2:19 PM  Subjective:   Admit Date: 4/8/2021  PCP: Erika Cleaning MD    Interval History:     Mr. Nasreen Olivas is doing well. RLE redness, swelling and pain continues to improve. Has no complaints . Yesterday had a very large BP. Appetite is good    Diet: DIET GENERAL;  Medications:   Scheduled Meds:   vancomycin        vancomycin        polyethylene glycol  17 g Oral Daily    sodium chloride flush  10 mL Intravenous 2 times per day    enoxaparin  40 mg Subcutaneous BID    aspirin  81 mg Oral Daily    buprenorphine-naloxone  1 tablet Sublingual Q12H    clotrimazole-betamethasone   Topical BID    fenofibrate  160 mg Oral Daily    furosemide  20 mg Oral Every Other Day    metoprolol tartrate  25 mg Oral BID    nicotine  1 patch Transdermal Daily    piperacillin-tazobactam  3,375 mg Intravenous Q8H    potassium chloride  10 mEq Oral Every Other Day    vancomycin  1,500 mg Intravenous Q8H    [START ON 4/5/2022] vancomycin (VANCOCIN) intermittent dosing (placeholder)   Other RX Placeholder     Continuous Infusions:   sodium chloride      sodium chloride       PRN Medications: sodium chloride, sodium chloride flush, sodium chloride, acetaminophen **OR** acetaminophen, polyethylene glycol, promethazine **OR** ondansetron, albuterol, calcium carbonate    Objective:   Vitals: /75   Pulse 90   Temp 98.1 °F (36.7 °C) (Oral)   Resp 18   Ht 6' 3\" (1.905 m)   Wt (!) 350 lb (158.8 kg)   SpO2 93%   BMI 43.75 kg/m²   BMI: Body mass index is 43.75 kg/m².         Physical Exam:    General Appearance: alert and oriented to person, place and time, in no distress  Cardiovascular: normal rate, regular rhythm, normal S1 and S2, no murmurs  Pulmonary/Chest: clear to auscultation bilaterally, no wheezes  Abdomen: Obese, soft, non-tender, non-distended, normal bowel sounds  Extremities s/p left AKA, right lower extremity with edema, improving redness, in ACE wraps  Neurological: alert, oriented, normal speech, no focal findings or movement disorder noted    Assessment and Plan:       1.  Right lower extremity cellulitis - continue  IV vancomycin and Zosyn,day# 7 redness  improving. Blood cultures showed no growth. Plan is to discharge home tomorrow. 2.  Severe sepsis - resolved  3.  Acute renal failure -resolved, most likely was secondary to severe sepsis.    4.  Hyponatremia - improved, most likely due to diuretics (was on Maxide )and acute renal failure. TSH is normal.  Urine and serum osmolalities within normal limits. 5.  Elevated troponin - trended down, appreciate 's input .  2D echo was poor image quality, global LV function was difficult to assess but appeared to be mildly reduced with estimated EF 50 to 55%, mild LVH,, no significant pulmonary hypertension findings.  Patient most likely will need outpatient stress test due to multiple risk factors for CAD  6.  COPD with hypoxia - continue on oxygen supplement, aerosol treatments.  Patient reports that he is oxygen dependent and has oxygen at home. 7.  Nicotine dependence  - on nicotine patch  8.  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  History of left lung lobectomy secondary to MRSA infection in 2010  10.  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  Morbid obesity  12.  Suspected sleep apnea -patient did not complete sleep study  13. H/O pulmonary nodule per CT chest from 1/6/2020 - patient did not complete follow-up CT chest. Was ordered during this admission but he was unable to do it .  Will follow up outpt  14.  History of opioid abuse/dependence -on Suboxone, follows up with Suboxone clinic in [place].  Last time refilled Suboxone 56 tablets on 3/26/2021.             Electronically signed by Cas Hennessy MD on 4/11/2021 at 2:19 PM    [facility] Hospitalist

## 2021-04-11 NOTE — PROGRESS NOTES
Family members visit. Pt eating breakfast, sits in bed.  Electronically signed by Jamil Ireland RN on 4/11/2021 at 8:28 AM

## 2021-04-11 NOTE — PROGRESS NOTES
Home O2 evaluation performed. O2 saturation on RA 87% at rest, HR 86. Placed on 4L NC and O2 saturation improved to 93-94% at rest, HR 87. Patient does not walk due to left leg amputation and just uses wheelchair to move around.

## 2021-04-12 ENCOUNTER — TELEPHONE (OUTPATIENT)
Dept: FAMILY MEDICINE CLINIC | Age: 56
End: 2021-04-12

## 2021-04-12 VITALS
DIASTOLIC BLOOD PRESSURE: 79 MMHG | OXYGEN SATURATION: 94 % | HEIGHT: 75 IN | TEMPERATURE: 98.1 F | SYSTOLIC BLOOD PRESSURE: 131 MMHG | WEIGHT: 315 LBS | RESPIRATION RATE: 18 BRPM | HEART RATE: 77 BPM | BODY MASS INDEX: 39.17 KG/M2

## 2021-04-12 PROCEDURE — 94640 AIRWAY INHALATION TREATMENT: CPT

## 2021-04-12 PROCEDURE — 94761 N-INVAS EAR/PLS OXIMETRY MLT: CPT

## 2021-04-12 PROCEDURE — 2580000003 HC RX 258: Performed by: INTERNAL MEDICINE

## 2021-04-12 PROCEDURE — 2700000000 HC OXYGEN THERAPY PER DAY

## 2021-04-12 PROCEDURE — 6360000002 HC RX W HCPCS: Performed by: INTERNAL MEDICINE

## 2021-04-12 RX ORDER — VANCOMYCIN HYDROCHLORIDE 500 MG/10ML
INJECTION, POWDER, LYOPHILIZED, FOR SOLUTION INTRAVENOUS
Status: DISCONTINUED
Start: 2021-04-12 | End: 2021-04-12 | Stop reason: HOSPADM

## 2021-04-12 RX ORDER — VANCOMYCIN HYDROCHLORIDE 1 G/20ML
INJECTION, POWDER, LYOPHILIZED, FOR SOLUTION INTRAVENOUS
Status: DISCONTINUED
Start: 2021-04-12 | End: 2021-04-12 | Stop reason: HOSPADM

## 2021-04-12 RX ORDER — ASPIRIN 81 MG/1
81 TABLET ORAL DAILY
Qty: 30 TABLET | Refills: 3
Start: 2021-04-12

## 2021-04-12 RX ORDER — DOXYCYCLINE HYCLATE 100 MG
100 TABLET ORAL 2 TIMES DAILY
Qty: 10 TABLET | Refills: 0 | Status: SHIPPED | OUTPATIENT
Start: 2021-04-12 | End: 2021-04-17

## 2021-04-12 RX ADMIN — VANCOMYCIN HYDROCHLORIDE 1500 MG: 500 INJECTION, POWDER, LYOPHILIZED, FOR SOLUTION INTRAVENOUS at 00:32

## 2021-04-12 RX ADMIN — SODIUM CHLORIDE, PRESERVATIVE FREE 10 ML: 5 INJECTION INTRAVENOUS at 00:31

## 2021-04-12 RX ADMIN — PIPERACILLIN SODIUM AND TAZOBACTAM SODIUM 3375 MG: 3; .375 INJECTION, POWDER, LYOPHILIZED, FOR SOLUTION INTRAVENOUS at 02:53

## 2021-04-12 NOTE — PROGRESS NOTES
Patient called in around [phone] and patient notified to call medical services company so they could come setup his oxygen. Patient given phone number.

## 2021-04-12 NOTE — DISCHARGE SUMMARY
IV Zosyn and Vancomycin with Pharmacy managing. ACE wraps were used to decrease lower leg edema. On IV antibiotics his WBC  Normalized. His creatinine improved to 0.65. Dr. Kaden Lawrence evaluated Sahil Hernandez for the elevation in troponin. An ECHO showed an EF of 50 to 55 %. Spenser Mccarthy was placed on Aspirin 81 mg upon admission. Dr. Kaden Lawrence recommended an out patient stress test.  This was discussed with Spenser Mccarthy who said he would think about it at time of discharge. With IV antibiotics his cellulitis significantly improved. Upon admission Spenser Mccarthy required nasal oxygen which was  Unable to be weaned off during his stay. He agreed to nasal oxygen at home which was arranged at time of discharge. With Juan Jose's improvement, it was felt he could be discharged with 5 days of Doxycycline at home and close out patient follow up with Dr. Jean Gar. Discharge Exam:    Vitals: /75   Pulse 71   Temp 98.1 °F (36.7 °C) (Oral)   Resp 18   Ht 6' 3\" (1.905 m)   Wt (!) 350 lb (158.8 kg)   SpO2 93%   BMI 43.75 kg/m²   General appearance: alert and cooperative with exam  HEENT: Head: Normocephalic, no lesions, without obvious abnormality. Eye: Normal external eye, conjunctiva, lids cornea, STEPHANIE. Nose: Normal external nose, mucus membranes and septum. Nose: Nasal oxygen on. Neck: no adenopathy and supple, symmetrical, trachea midline  Lungs: clear to auscultation bilaterally  But diminished  Heart: regular rate and rhythm and S1, S2 normal  Abdomen: soft, non-tender; bowel sounds normal; no masses,  no organomegaly and morbidly obese. Extremities: Left AKA. Right leg with edema to the upper thigh. ACE wrap on the lower leg. No calf tenderness.   Neurologic: Mental status: Alert, oriented, thought content appropriate    Discharge Medications:       Meredeth Garden   Home Medication Instructions XRT:[id number]    Printed on:04/12/21 0591   Medication Information                      albuterol (PROVENTIL) (2.5 MG/3ML) 0.083%

## 2021-04-12 NOTE — TELEPHONE ENCOUNTER
[facility] Transitions Initial Follow Up Call    Outreach made within 2 business days of discharge: Yes    Patient: Toshia Dennis Patient : 1965   MRN: [id number]  Reason for Admission: Cellulitis of right leg  Discharge Date: 21       Spoke with: patient does not have a vm box set up    Discharge department/facility: [facility]      Scheduled appointment with PCP within 7-14 days    Follow Up  Future Appointments   Date Time Provider Yajaira Sahni   2021  2:30 PM [facility]   2021 10:45 AM SCHEDULE, [address] [facility]   2021 11:30 AM [facility] CAT SCAN ROOM [facility] CT [facility] Rad   2021 12:30 PM Yajaira [facility] PULMONARY FUNCTION  [facility] PFT Martha Carmen MA

## 2021-04-12 NOTE — PROGRESS NOTES
Hospitalist Progress Note  4/12/2021 5:39 AM  Subjective:   Admit Date: 4/8/2021  PCP: Flavio Hayes MD    Interval History: Sania Meza has no complaints this am.  He states he is happy he is going home today. No chest pain or SOB. Sania Meza states his right leg is always swollen and red and it is at baseline. Appetite is good, no nausea. Bowels are moving well and he denies any trouble urinating. Diet: DIET GENERAL;  Medications:   Scheduled Meds:   vancomycin        vancomycin        vancomycin        vancomycin        polyethylene glycol  17 g Oral Daily    sodium chloride flush  10 mL Intravenous 2 times per day    enoxaparin  40 mg Subcutaneous BID    aspirin  81 mg Oral Daily    buprenorphine-naloxone  1 tablet Sublingual Q12H    clotrimazole-betamethasone   Topical BID    fenofibrate  160 mg Oral Daily    furosemide  20 mg Oral Every Other Day    metoprolol tartrate  25 mg Oral BID    nicotine  1 patch Transdermal Daily    piperacillin-tazobactam  3,375 mg Intravenous Q8H    potassium chloride  10 mEq Oral Every Other Day    vancomycin  1,500 mg Intravenous Q8H    [START ON 4/5/2022] vancomycin (VANCOCIN) intermittent dosing (placeholder)   Other RX Placeholder     Continuous Infusions:   sodium chloride      sodium chloride         Patient's current medications documented, reviewed, and updated. CBC: No results for input(s): WBC, HGB, PLT in the last 72 hours. BMP:    Recent Labs     04/10/21  0928   *   K 4.2   CL 94*   CO2 33*   BUN 11   CREATININE 0.65*   GLUCOSE 185*     Hepatic: No results for input(s): AST, ALT, ALB, BILITOT, ALKPHOS in the last 72 hours. Troponin: No results for input(s): TROPONINI in the last 72 hours. BNP: No results for input(s): BNP in the last 72 hours. Lipids: No results for input(s): CHOL, HDL in the last 72 hours. Invalid input(s): LDLCALCU  INR: No results for input(s): INR in the last 72 hours.       Objective:   Vitals: /75 Pulse 71   Temp 98.1 °F (36.7 °C) (Oral)   Resp 18   Ht 6' 3\" (1.905 m)   Wt (!) 350 lb (158.8 kg)   SpO2 93%   BMI 43.75 kg/m²   General appearance: alert and cooperative with exam  HEENT: Head: Normocephalic, no lesions, without obvious abnormality. Eye: Normal external eye, conjunctiva, lids cornea, STEPHANIE. Nose: Normal external nose, mucus membranes and septum. Nose: Nasal oxygen on. Neck: no adenopathy and supple, symmetrical, trachea midline  Lungs: clear to auscultation bilaterally  But diminished  Heart: regular rate and rhythm and S1, S2 normal  Abdomen: soft, non-tender; bowel sounds normal; no masses,  no organomegaly and morbidly obese. Extremities: Left AKA. Right leg with edema to the upper thigh. ACE wrap on the lower leg. No calf tenderness. Neurologic: Mental status: Alert, oriented, thought content appropriate    Assessment and Plan:   1. Right leg cellulitis - improved with IV Zosyn and Vancomycin. 2.  Sepsis - resolved. 3.  ARF - resolved. 4.  Elevated troponin - 2D echo was poor image quality, global LV function was difficult to assess but appeared to be mildly reduced with estimated EF 50 to 55%, mild LVH,, no significant pulmonary hypertension findings. 5.  COPD with hypoxia - will need long term oxygen at home. 6.  Smoker - placed on Nicotine patch. 7.  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 8.  S/P left AKA. 9.  S/P left lung lobectomy secondary to MRSA infection in 2010. 10.  Likely GRUPO - patient did not complete sleep study in the past.  11.  H/O pulmonary nodules on CT scan 1/6/20. Unable to do CT on this admission. Dr. Geovany Terrell to follow up as an out patient. 12.  On Suboxone secondary to h/o opioid abuse. Plan:  1. Discharge home today with close out patient follow up with Dr. Geovany Terrell. 2.  Will need oxygen at home.           DVT prophylaxis:   [x] Lovenox   [] SCDs   [] SQ Heparin   [] Encourage ambulation, low risk for DVT, no chemical or mechanical    prophylaxis necessary      [] Already on Anticoagulation    Patient Active Problem List:     Cellulitis of lower leg     Hemoptysis     HCV (hepatitis C virus)     HTN (hypertension)     Hx MRSA infection     Morbid obesity with BMI of 45.0-49.9, adult (HCC)     COPD (chronic obstructive pulmonary disease) (HCC)     Other hyperlipidemia     Cellulitis of right leg     Sepsis with acute hypoxic respiratory failure without septic shock (HCC)     Elevated troponin     Type 2 MI (myocardial infarction) (HCC)     Acute on chronic combined systolic and diastolic CHF, NYHA class 4 ([facility] [address]        Advanced Care Plan    (x)  I confirmed that the patient's Advanced Care Plan is present, code status documented, or surrogate decision maker is listed in the patient's medical record. ( )  The patient's advanced care plan is not present because:  (select)   ( ) I confirmed today that the patient does not wish or was not able to name a surrogate decision maker or provide an [address] ( ) Hospice care is currently being provided or has been provided this calender year. ( )  I did not confirm today the presence of an [address] or surrogate decision maker documented within the patient's medical record. (Does not satisfy MIPS performance). Documentation of Current Medications in the Medical Record    (x)  I have utilized all available immediate resources to obtain, update, or review the patient's current medications. If Yes, Stop Here  ( ) The patient is not eligivel for medications reconciliation; the patient is in an emergent medical situation where delaying treatment would jeopardize the patient's health. ( ) I did not confirm, update or review the patient's current list of medications today.   (does not satisfy MIPS performance)      Diya Torres MD  [facility] Hospitalist

## 2021-04-12 NOTE — PROGRESS NOTES
Empty suboxone bottle and cash from safe counted with witness Desi Garvin in front of patient. All sign in attestation. PICC line removed without incident. Discharge instructions provided.

## 2021-04-12 NOTE — PROGRESS NOTES
Discharge instructions provided to patient and daughter at bedside. Verbalized understanding of follow up appointment, wound care, diet, activity, medications and reasons to return to ED/call physician. All questions answered. Copy of discharge instructions provided.

## 2021-04-12 NOTE — PROGRESS NOTES
Medical services company called and said the could not reach the patient. Writer tried notifying patient several times and does not answer. Writer notified home care company attempts have been made.

## 2021-04-12 NOTE — PLAN OF CARE
Problem: Skin Integrity:  Goal: Will show no infection signs and symptoms  Description: Will show no infection signs and symptoms  Outcome: Completed  Goal: Absence of new skin breakdown  Description: Absence of new skin breakdown  Outcome: Completed     Problem: Falls - Risk of:  Goal: Will remain free from falls  Description: Will remain free from falls  Outcome: Completed  Goal: Absence of physical injury  Description: Absence of physical injury  Outcome: Completed     Problem: Discharge Planning:  Goal: Discharged to appropriate level of care  Description: Discharged to appropriate level of care  Outcome: Completed     Problem:  Body Temperature - Imbalanced:  Goal: Ability to maintain a body temperature in the normal range will improve  Description: Ability to maintain a body temperature in the normal range will improve  Outcome: Completed     Problem: Mobility - Impaired:  Goal: Mobility will improve to maximum level  Description: Mobility will improve to maximum level  Outcome: Completed     Problem: Pain:  Goal: Pain level will decrease  Description: Pain level will decrease  Outcome: Completed  Goal: Control of acute pain  Description: Control of acute pain  Outcome: Completed  Goal: Control of chronic pain  Description: Control of chronic pain  Outcome: Completed     Problem: Skin Integrity - Impaired:  Goal: Will show no infection signs and symptoms  Description: Will show no infection signs and symptoms  Outcome: Completed  Goal: Absence of new skin breakdown  Description: Absence of new skin breakdown  Outcome: Completed     Problem: Pain:  Goal: Pain level will decrease  Description: Pain level will decrease  Outcome: Completed  Goal: Control of acute pain  Description: Control of acute pain  Outcome: Completed  Goal: Control of chronic pain  Description: Control of chronic pain  Outcome: Completed     Problem: Nutrition  Goal: Optimal nutrition therapy  Outcome: Completed

## 2021-04-12 NOTE — PROGRESS NOTES
Acknowledge pt discharge to home today via private vehicle with family. Pt chose not to wait until home oxygen delivery set up was complete with RT but has been using his father in laws oxygen at home as he is currently in a nursing facility. No further needs identified by pt.  Amanda GOTTLIEB LSW 4/12/2021

## 2021-04-12 NOTE — PROGRESS NOTES
Paperwork faxed to [facility] for home oxygen setup. Called an notified that it would take a little bit due to them having to verify if oxygen is covered with insurance. RN Nannette Awan notified and patient does not want to wait. Medical services company oxygen tank given to patient to go home with. Patient states has oxygen concentrator at home to use but it is his father-in laws. Patient made aware and suggested for patient to wait but he refused to wait until notified of when Oxygen delivery would be made to his house.

## 2021-04-13 ENCOUNTER — CARE COORDINATION (OUTPATIENT)
Dept: CASE MANAGEMENT | Age: 56
End: 2021-04-13

## 2021-04-13 DIAGNOSIS — L03.115 CELLULITIS OF RIGHT LEG: Primary | ICD-10-CM

## 2021-04-13 DIAGNOSIS — J44.9 CHRONIC OBSTRUCTIVE PULMONARY DISEASE, UNSPECIFIED COPD TYPE (HCC): ICD-10-CM

## 2021-04-13 PROCEDURE — 1111F DSCHRG MED/CURRENT MED MERGE: CPT | Performed by: INTERNAL MEDICINE

## 2021-04-14 ENCOUNTER — TELEPHONE (OUTPATIENT)
Dept: FAMILY MEDICINE CLINIC | Age: 56
End: 2021-04-14

## 2021-04-14 NOTE — TELEPHONE ENCOUNTER
Spoke to patient about overdue orders. He was recently released from the hospital and rescheduled his chest CT for 4/29/21.  I will extend the order until 4/30/21

## 2021-04-21 ENCOUNTER — CARE COORDINATION (OUTPATIENT)
Dept: CASE MANAGEMENT | Age: 56
End: 2021-04-21

## 2021-04-21 NOTE — CARE COORDINATION
[facility] Transitions Follow Up Call    2021    Patient: Karime Howard  Patient : 1965   MRN: [id number]  Reason for Admission: COPD, R & L LE cellulitis, severe sepsis  Discharge Date: 21 RARS: Readmission Risk Score: 13         Spoke with: Cheryl Madrigal     Call to pt who states he is doing alright   States breathing has been good and using nebulizer 2 times a day- states understanding he can use it every 4 hours as needed   States swelling and drainage are down. States skin is peeling on lower legs- writer advised showing Dr Rick Martinez at appt on   Denies fever/ chills  States eating and drinking well   Denies needs  Encouraged to call writer/ CTC or providers if questions or concerns- v/u     Care Transitions Subsequent and Final Call    Subsequent and Final Calls  Do you have any ongoing symptoms?: No  Have your medications changed?: No  Do you have any questions related to your medications?: No  Do you currently have any active services?: No  Do you have any needs or concerns that I can assist you with?: No  Identified Barriers: Lack of Education  Care Transitions Interventions  Other Interventions:            Follow Up  Future Appointments   Date Time Provider Yajaira Sahni   2021 11:30 AM Erika Cleaning MD [facility] AT [facility]   2021 10:45 AM SCHEDULE, [facility] COVID19 PAT SCREENING [facility] PAT [facility]   2021 11:30 AM [facility] CAT SCAN ROOM [facility] CT [facility] Rad   2021 12:30 PM [facility] PULMONARY FUNCTION  [facility] PFT Lala Cardona RN

## 2021-04-22 NOTE — PATIENT INSTRUCTIONS
SURVEY:    You may be receiving a survey from [employer] regarding your visit today. Please complete the survey to enable us to provide the highest quality of care to you and your family. If you cannot score us a very good on any question, please call the office to discuss how we could of made your experience a very good one. Thank you. You may be receiving a survey from [employer] regarding your visit today. You may get this in the mail, through your MyChart or in your email. Please complete the survey to enable us to provide the highest quality of care to you and your family. If you cannot score us as very good ( 5 Stars) on any question, please feel free to call the office to discuss how we could have made your experience exceptional.     Thank You!       MD Lyle Abreu, FARZAD Louis\

## 2021-04-23 ENCOUNTER — OFFICE VISIT (OUTPATIENT)
Dept: FAMILY MEDICINE CLINIC | Age: 56
End: 2021-04-23
Payer: MEDICARE

## 2021-04-23 VITALS
DIASTOLIC BLOOD PRESSURE: 84 MMHG | HEART RATE: 100 BPM | TEMPERATURE: 98 F | SYSTOLIC BLOOD PRESSURE: 138 MMHG | OXYGEN SATURATION: 98 %

## 2021-04-23 DIAGNOSIS — E87.1 HYPONATREMIA: ICD-10-CM

## 2021-04-23 DIAGNOSIS — N17.9 ACUTE RENAL FAILURE, UNSPECIFIED ACUTE RENAL FAILURE TYPE (HCC): ICD-10-CM

## 2021-04-23 DIAGNOSIS — R91.1 PULMONARY NODULE: ICD-10-CM

## 2021-04-23 DIAGNOSIS — Z89.612 S/P AKA (ABOVE KNEE AMPUTATION) UNILATERAL, LEFT (HCC): ICD-10-CM

## 2021-04-23 DIAGNOSIS — J44.1 CHRONIC OBSTRUCTIVE PULMONARY DISEASE WITH ACUTE EXACERBATION (HCC): ICD-10-CM

## 2021-04-23 DIAGNOSIS — R77.8 ELEVATED TROPONIN LEVEL: ICD-10-CM

## 2021-04-23 DIAGNOSIS — L03.119 CELLULITIS OF LOWER LEG: Primary | ICD-10-CM

## 2021-04-23 PROBLEM — R79.89 ELEVATED TROPONIN LEVEL: Status: ACTIVE | Noted: 2021-04-23

## 2021-04-23 PROCEDURE — 1111F DSCHRG MED/CURRENT MED MERGE: CPT | Performed by: INTERNAL MEDICINE

## 2021-04-23 PROCEDURE — 99495 TRANSJ CARE MGMT MOD F2F 14D: CPT | Performed by: INTERNAL MEDICINE

## 2021-04-23 RX ORDER — FLUCONAZOLE 150 MG/1
150 TABLET ORAL
Qty: 2 TABLET | Refills: 0 | Status: SHIPPED | OUTPATIENT
Start: 2021-04-23 | End: 2021-04-29

## 2021-04-23 RX ORDER — CLOTRIMAZOLE 1 %
CREAM (GRAM) TOPICAL
Qty: 60 G | Refills: 1 | Status: SHIPPED | OUTPATIENT
Start: 2021-04-23 | End: 2021-06-03

## 2021-04-23 RX ORDER — ALBUTEROL SULFATE 2.5 MG/3ML
2.5 SOLUTION RESPIRATORY (INHALATION) EVERY 4 HOURS PRN
Qty: 120 EACH | Refills: 11 | Status: SHIPPED | OUTPATIENT
Start: 2021-04-23

## 2021-04-23 NOTE — PROGRESS NOTES
Post-Discharge Transitional Care Management Services or Hospital Follow Up      Ari Rod   YOB: 1965    Date of Office Visit:  4/23/2021  Date of Hospital Admission: 4/8/21  Date of Hospital Discharge: 4/12/21  Risk of hospital readmission (high >=14%.  Medium >=10%) :Readmission Risk Score: 13      Care management risk score Rising risk (score 2-5) and Complex Care (Scores >=6): 6     Non face to face  following discharge, date last encounter closed (first attempt may have been earlier): 4/13/2021 10:05 AM    Call initiated 2 business days of discharge: Yes    Patient Active Problem List   Diagnosis    Cellulitis of lower leg    Hemoptysis    HCV (hepatitis C virus)    HTN (hypertension)    Hx MRSA infection    Morbid obesity with BMI of 45.0-49.9, adult ([facility] [facility]    COPD (chronic obstructive pulmonary disease) ([facility]    Other hyperlipidemia    Cellulitis of right leg    Sepsis with acute hypoxic respiratory failure without septic shock ([facility] [facility]    Elevated troponin    Type 2 MI (myocardial infarction) ([address]    Acute on chronic combined systolic and diastolic CHF, NYHA class 4 ([address]    S/P AKA (above knee amputation) unilateral, left (HCC)    Hyponatremia    Acute renal failure (ARF) ([address]    Pulmonary nodule    Elevated troponin level       No Known Allergies    Medications listed as ordered at the time of discharge from hospital   Ion Dozier   Home Medication Instructions LEONOR:    Printed on:04/23/21 1302   Medication Information                      albuterol (PROVENTIL) (2.5 MG/3ML) 0.083% nebulizer solution  Take 3 mLs by nebulization every 4 hours as needed for Wheezing or Shortness of Breath             albuterol sulfate HFA (VENTOLIN HFA) 108 (90 Base) MCG/ACT inhaler  Inhale 2 puffs into the lungs every 6 hours as needed for Wheezing             aspirin 81 MG EC tablet  Take 1 tablet by mouth daily             buprenorphine-naloxone (SUBOXONE) 8-2 MG SUBL SL (KLOR-CON M) 10 MEQ extended release tablet Take 1 tablet by mouth every other day 15 tablet 5    lisinopril (PRINIVIL;ZESTRIL) 5 MG tablet Take 1 tablet by mouth daily 30 tablet 3    fenofibrate (TRIGLIDE) 160 MG tablet TAKE 1 TABLET BY MOUTH EVERY DAY 90 tablet 1    triamterene-hydroCHLOROthiazide (MAXZIDE-25) 37.5-25 MG per tablet TAKE 1 TABLET BY MOUTH EVERY DAY 90 tablet 1    buprenorphine-naloxone (SUBOXONE) 8-2 MG SUBL SL tablet DISSOLVE 1 (ONE) UNDER THE TONGUE EVERY 12 HOURS          Medications patient taking as of now reconciled against medications ordered at time of hospital discharge: Yes    Chief Complaint   Patient presents with    Follow-Up from Hospital     Patient presents today for a hospital follow up. History of Present illness - Follow up of Hospital diagnosis(es): Right lower extremity cellulitis, hyponatremia, lung nodule    Inpatient course: Discharge summary reviewed- see chart. Mr. Danie Venegas presents to office to follow-up after recent hospitalization at [facility] from 4/8-4/12 for right lower extremity cellulitis. During his hospitalization he was also found to have acute renal failure, severe sepsis, hyponatremia, elevated troponin level. He was treated with IV vancomycin and Zosyn course for extensive cellulitis of the right lower extremity. Redness, swelling and pain in the right leg improved, leukocytosis improved. He was discharged to swing bed and finished antibiotic course. Right lower extremity was wrapped in Ace to help decrease swelling in the leg. His creatinine improved to 0.65. He had elevated troponin level, however no chest pain. Due to multiple risk factors for heart disease cardiology was consulted. Today he is to follow-up for acute issues addressed during hospital stay      Right lower extremity cellulitis -significantly improved. Finished antibiotic course.   His only complaint today is that he has dry skin, whitish spots on his right foot and around ankle area concerning for fungal infection. He has no pain in the leg. He has Ace wrap's. Elevated troponin levels. Eventually trended down. He had echo revealing mildly reduced LV function with EF 50 to 55%. Was evaluated by cardiologist  and recommended outpatient stress test.  Patient states he is scheduled for stress test on 4/29. Reports no chest pain today. His shortness of breath is at baseline, has chronic COPD with respiratory failure. He has a history of pulmonary nodule per CT chest from 1/6/2020. Patient refused to have follow-up CT during hospitalization stating that he would follow-up as an outpatient. He understands that with his history of smoking he is high risk for pulmonary malignancy. He is scheduled for outpatient CT chest on 4/29. He had issues with hyponatremia. It improved. He was on Maxide and this was discontinued during hospitalization, however resumed after hospital stay to help with swelling in the leg. Will check BMP on 4/29 for follow-up    He has COPD with hypoxia. Unfortunately continues to smoke. He reports that the problem is stable. Needs prescription for nebulizer. I also refilled his Joselito Medin that he ran out    Interval history/Current status:     A comprehensive review of systems was negative except for what was noted in the HPI. Vitals:    04/23/21 1152   BP: 138/84   Site: Left Lower Arm   Position: Sitting   Cuff Size: Medium Adult   Pulse: 100   Temp: 98 °F (36.7 °C)   SpO2: 98%     There is no height or weight on file to calculate BMI.    Wt Readings from Last 3 Encounters:   04/08/21 (!) 350 lb (158.8 kg)   04/05/21 (!) 350 lb (158.8 kg)   03/15/21 (!) 370 lb (167.8 kg)     BP Readings from Last 3 Encounters:   04/23/21 138/84   04/12/21 131/79   04/08/21 (!) 131/54        Physical Exam:  General Appearance: alert and oriented to person, place and time, well developed and well- nourished, in no acute distress  Skin: warm and dry, no rash or erythema  Head: normocephalic and atraumatic  Eyes: pupils equal, round, and reactive to light, extraocular eye movements intact, conjunctivae normal  ENT: tympanic membrane, external ear and ear canal normal bilaterally, nose without deformity, nasal mucosa and turbinates normal without polyps  Neck: supple and non-tender without mass, no thyromegaly ,  no cervical lymphadenopathy  Pulmonary/Chest: Scattered expiratory wheezes bilaterally,no  rales or rhonchi, diminished air movement, no respiratory distress  Cardiovascular: normal rate, regular rhythm, normal S1 and S2, no murmurs, rubs, clicks, or gallops  Abdomen: soft, non-tender, non-distended, normal bowel sounds, no masses or organomegaly  Extremities: Left AKA, right lower extremity with significantly improved redness, swelling is down. He has dry scaly skin. He also has some white patches mainly on his foot around toenails concerning for fungal superimposed infection. Neurologic: Awake, alert, no cranial nerve deficit,  speech normal    Assessment/Plan:      1. Cellulitis of lower leg  Finished antibacterial antibiotic course. During today's examination noted to have what appears fungal superimposed infection. For this reason prescribed Diflucan 150 mg x 2. Also prescribed Lotrimin topical cream.  Follow-up as needed    - [place] DISCHARGE MEDS RECONCILED W/ CURRENT OUTPATIENT MED LIST  - fluconazole (DIFLUCAN) 150 MG tablet; Take 1 tablet by mouth every 72 hours for 6 days  Dispense: 2 tablet; Refill: 0  - clotrimazole (LOTRIMIN AF) 1 % cream; Apply topically 2 times daily. Dispense: 60 g; Refill: 1    2. Hyponatremia  We will check BMP on 4/29 to follow-up on sodium level  - [place] DISCHARGE MEDS RECONCILED W/ CURRENT OUTPATIENT MED LIST  - Basic Metabolic Panel; Future    3. Acute renal failure, unspecified acute renal failure type ([facility] [facility]  Resolved during hospitalization.   Check BMP for follow-up  - [place] DISCHARGE MEDS RECONCILED W/ CURRENT OUTPATIENT MED LIST  - Basic Metabolic Panel; Future    4. Pulmonary nodule  Patient is scheduled for follow-up CT chest on 4/29/2021.  - [place] DISCHARGE MEDS RECONCILED W/ CURRENT OUTPATIENT MED LIST    5. Elevated troponin level  Patient scheduled for stress test on 4/29/2021. Reports no chest pain today  - [place] DISCHARGE MEDS RECONCILED W/ CURRENT OUTPATIENT MED LIST    6. S/P AKA (above knee amputation) unilateral, left (HCC)    - [place] DISCHARGE MEDS RECONCILED W/ CURRENT OUTPATIENT MED LIST    7. Chronic obstructive pulmonary disease with acute exacerbation (HCC)  Symptoms stable. Continue on albuterol nebulizer. Refill Breo Ellipta that he was not taking and I encouraged him to use it every day. Also encouraged him to quit smoking. Follow-up in 3 months  - albuterol (PROVENTIL) (2.5 MG/3ML) 0.083% nebulizer solution; Take 3 mLs by nebulization every 4 hours as needed for Wheezing or Shortness of Breath  Dispense: 120 each; Refill: 11  - Fluticasone furoate-vilanterol (BREO ELLIPTA) 200-25 MCG/INH AEPB inhaler; Inhale 1 puff into the lungs daily  Dispense: 1 each;  Refill: 2        Medical Decision Making: high complexity

## 2021-04-28 ENCOUNTER — CARE COORDINATION (OUTPATIENT)
Dept: CASE MANAGEMENT | Age: 56
End: 2021-04-28
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
## 2021-04-29 ENCOUNTER — HOSPITAL ENCOUNTER (OUTPATIENT)
Dept: PULMONOLOGY | Age: 56
Discharge: HOME | End: 2021-04-29
Payer: MEDICARE

## 2021-04-29 ENCOUNTER — HOSPITAL ENCOUNTER (OUTPATIENT)
Dept: CT IMAGING | Age: 56
Discharge: HOME | End: 2021-05-01
Payer: MEDICARE

## 2021-04-29 ENCOUNTER — HOSPITAL ENCOUNTER (OUTPATIENT)
Dept: PREADMISSION TESTING | Age: 56
Setting detail: SPECIMEN
Discharge: HOME | End: 2021-04-29
Payer: MEDICARE

## 2021-04-29 VITALS — OXYGEN SATURATION: 97 %

## 2021-04-29 DIAGNOSIS — R91.1 PULMONARY NODULE: ICD-10-CM

## 2021-04-29 DIAGNOSIS — J44.9 CHRONIC OBSTRUCTIVE PULMONARY DISEASE, UNSPECIFIED COPD TYPE (HCC): ICD-10-CM

## 2021-04-29 LAB
SARS-COV-2, RAPID: NOT DETECTED
SPECIMEN DESCRIPTION: NORMAL

## 2021-04-29 PROCEDURE — 87635 SARS-COV-2 COVID-19 AMP PRB: CPT

## 2021-04-29 PROCEDURE — 94060 EVALUATION OF WHEEZING: CPT | Performed by: INTERNAL MEDICINE

## 2021-04-29 PROCEDURE — 6370000000 HC RX 637 (ALT 250 FOR IP): Performed by: INTERNAL MEDICINE

## 2021-04-29 PROCEDURE — 94729 DIFFUSING CAPACITY: CPT

## 2021-04-29 PROCEDURE — 94060 EVALUATION OF WHEEZING: CPT

## 2021-04-29 PROCEDURE — 94727 GAS DIL/WSHOT DETER LNG VOL: CPT

## 2021-04-29 PROCEDURE — C9803 HOPD COVID-19 SPEC COLLECT: HCPCS

## 2021-04-29 PROCEDURE — 71250 CT THORAX DX C-: CPT

## 2021-04-29 PROCEDURE — 94729 DIFFUSING CAPACITY: CPT | Performed by: INTERNAL MEDICINE

## 2021-04-29 RX ORDER — ALBUTEROL SULFATE 90 UG/1
2 AEROSOL, METERED RESPIRATORY (INHALATION) ONCE
Status: COMPLETED | OUTPATIENT
Start: 2021-04-29 | End: 2021-04-29

## 2021-04-29 RX ADMIN — ALBUTEROL SULFATE 2 PUFF: 90 AEROSOL, METERED RESPIRATORY (INHALATION) at 12:47

## 2021-06-02 DIAGNOSIS — L03.119 CELLULITIS OF LOWER LEG: ICD-10-CM

## 2021-06-03 RX ORDER — CLOTRIMAZOLE 1 %
CREAM (GRAM) TOPICAL
Qty: 60 G | Refills: 1 | Status: SHIPPED | OUTPATIENT
Start: 2021-06-03 | End: 2021-08-02

## 2021-06-03 NOTE — TELEPHONE ENCOUNTER
([facility] [address]     Acute on chronic combined systolic and diastolic CHF, NYHA class 4 (HCC)     S/P AKA (above knee amputation) unilateral, left (HCC)     Hyponatremia     Acute renal failure (ARF) (HCC)     Pulmonary nodule     Elevated troponin level

## 2021-06-08 DIAGNOSIS — E78.5 HYPERLIPIDEMIA, UNSPECIFIED HYPERLIPIDEMIA TYPE: ICD-10-CM

## 2021-06-08 NOTE — TELEPHONE ENCOUNTER
infarction) ([facility] [address]     Acute on chronic combined systolic and diastolic CHF, NYHA class 4 (HCC)     S/P AKA (above knee amputation) unilateral, left (HCC)     Hyponatremia     Acute renal failure (ARF) (HCC)     Pulmonary nodule     Elevated troponin level

## 2021-06-09 RX ORDER — FENOFIBRATE 160 MG/1
TABLET ORAL
Qty: 90 TABLET | Refills: 1 | Status: SHIPPED | OUTPATIENT
Start: 2021-06-09

## 2021-06-09 RX ORDER — LISINOPRIL 5 MG/1
5 TABLET ORAL DAILY
Qty: 30 TABLET | Refills: 3 | Status: SHIPPED | OUTPATIENT
Start: 2021-06-09

## 2021-08-01 DIAGNOSIS — L03.119 CELLULITIS OF LOWER LEG: ICD-10-CM

## 2021-08-02 RX ORDER — CLOTRIMAZOLE 1 %
CREAM (GRAM) TOPICAL
Qty: 60 G | Refills: 1 | Status: SHIPPED | OUTPATIENT
Start: 2021-08-02

## 2021-10-14 ENCOUNTER — HOSPITAL ENCOUNTER (EMERGENCY)
Age: 56
Discharge: HOME | End: 2021-10-14
Attending: EMERGENCY MEDICINE
Payer: MEDICARE

## 2021-10-14 VITALS
BODY MASS INDEX: 40.43 KG/M2 | TEMPERATURE: 98.5 F | DIASTOLIC BLOOD PRESSURE: 80 MMHG | HEART RATE: 94 BPM | OXYGEN SATURATION: 94 % | WEIGHT: 315 LBS | SYSTOLIC BLOOD PRESSURE: 140 MMHG | RESPIRATION RATE: 16 BRPM | HEIGHT: 74 IN

## 2021-10-14 DIAGNOSIS — S81.811A LACERATION OF RIGHT LOWER EXTREMITY, INITIAL ENCOUNTER: Primary | ICD-10-CM

## 2021-10-14 PROCEDURE — 6370000000 HC RX 637 (ALT 250 FOR IP): Performed by: EMERGENCY MEDICINE

## 2021-10-14 PROCEDURE — 99283 EMERGENCY DEPT VISIT LOW MDM: CPT

## 2021-10-14 RX ORDER — CEPHALEXIN 500 MG/1
500 CAPSULE ORAL 4 TIMES DAILY
Qty: 28 CAPSULE | Refills: 0 | Status: SHIPPED | OUTPATIENT
Start: 2021-10-14

## 2021-10-14 RX ORDER — CEPHALEXIN 500 MG/1
500 CAPSULE ORAL ONCE
Status: COMPLETED | OUTPATIENT
Start: 2021-10-14 | End: 2021-10-14

## 2021-10-14 RX ADMIN — CEPHALEXIN 500 MG: 500 CAPSULE ORAL at 19:15

## 2021-10-14 ASSESSMENT — ENCOUNTER SYMPTOMS
ABDOMINAL PAIN: 0
TROUBLE SWALLOWING: 0
EYE PAIN: 0
COUGH: 0
BACK PAIN: 0
SHORTNESS OF BREATH: 0
DIARRHEA: 0
EYE REDNESS: 0
VOMITING: 0
COLOR CHANGE: 0
SORE THROAT: 0
NAUSEA: 0
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
## 2021-11-09 ENCOUNTER — HOSPITAL ENCOUNTER (EMERGENCY)
Age: 56
Discharge: ANOTHER HOSPITAL | End: 2021-11-10
Attending: EMERGENCY MEDICINE
Payer: MEDICARE

## 2021-11-09 ENCOUNTER — APPOINTMENT (OUTPATIENT)
Dept: GENERAL RADIOLOGY | Age: 56
End: 2021-11-09
Payer: MEDICARE

## 2021-11-09 DIAGNOSIS — T14.90XA INHALATION INJURY: ICD-10-CM

## 2021-11-09 DIAGNOSIS — J96.01 ACUTE RESPIRATORY FAILURE WITH HYPOXIA AND HYPERCAPNIA (HCC): Primary | ICD-10-CM

## 2021-11-09 DIAGNOSIS — J96.02 ACUTE RESPIRATORY FAILURE WITH HYPOXIA AND HYPERCAPNIA (HCC): Primary | ICD-10-CM

## 2021-11-09 LAB
ABSOLUTE EOS #: 0.2 K/UL (ref 0–0.4)
ABSOLUTE IMMATURE GRANULOCYTE: ABNORMAL K/UL (ref 0–0.3)
ABSOLUTE LYMPH #: 0.9 K/UL (ref 1–4.8)
ABSOLUTE MONO #: 0.9 K/UL (ref 0–1)
ALLEN TEST: POSITIVE
ANION GAP SERPL CALCULATED.3IONS-SCNC: ABNORMAL MMOL/L (ref 9–17)
BASOPHILS # BLD: 0 % (ref 0–2)
BASOPHILS ABSOLUTE: 0 K/UL (ref 0–0.2)
BUN BLDV-MCNC: 11 MG/DL (ref 6–20)
BUN/CREAT BLD: 18 (ref 9–20)
CALCIUM SERPL-MCNC: 8.6 MG/DL (ref 8.6–10.4)
CARBOXYHEMOGLOBIN: 5.7 % (ref 0–5)
CHLORIDE BLD-SCNC: 92 MMOL/L (ref 98–107)
CO2: >45 MMOL/L (ref 20–31)
CREAT SERPL-MCNC: 0.6 MG/DL (ref 0.7–1.2)
DIFFERENTIAL TYPE: YES
EOSINOPHILS RELATIVE PERCENT: 2 % (ref 0–5)
FIO2: ABNORMAL
GFR AFRICAN AMERICAN: >60 ML/MIN
GFR NON-AFRICAN AMERICAN: >60 ML/MIN
GFR SERPL CREATININE-BSD FRML MDRD: ABNORMAL ML/MIN/{1.73_M2}
GFR SERPL CREATININE-BSD FRML MDRD: ABNORMAL ML/MIN/{1.73_M2}
GLUCOSE BLD-MCNC: 123 MG/DL (ref 70–99)
HCT VFR BLD CALC: 38.5 % (ref 41–53)
HEMOGLOBIN: 11.9 G/DL (ref 13.5–17.5)
IMMATURE GRANULOCYTES: ABNORMAL %
LACTIC ACID, WHOLE BLOOD: NORMAL MMOL/L (ref 0.7–2.1)
LACTIC ACID: 1.2 MMOL/L (ref 0.5–2.2)
LYMPHOCYTES # BLD: 10 % (ref 13–44)
MCH RBC QN AUTO: 26.3 PG (ref 26–34)
MCHC RBC AUTO-ENTMCNC: 31 G/DL (ref 31–37)
MCV RBC AUTO: 85 FL (ref 80–100)
MODE: 45
MONOCYTES # BLD: 11 % (ref 5–9)
NEGATIVE BASE EXCESS, ART: ABNORMAL (ref 0–2)
NRBC AUTOMATED: ABNORMAL PER 100 WBC
O2 DEVICE/FLOW/%: ABNORMAL
PATIENT TEMP: ABNORMAL
PDW BLD-RTO: 16.4 % (ref 12.1–15.2)
PLATELET # BLD: 188 K/UL (ref 140–450)
PLATELET ESTIMATE: ABNORMAL
PMV BLD AUTO: ABNORMAL FL (ref 6–12)
POC HCO3: 47 MMOL/L (ref 21–28)
POC O2 SATURATION: 74 % (ref 94–98)
POC PCO2 TEMP: ABNORMAL MM HG
POC PCO2: 103.2 MM HG (ref 35–48)
POC PH TEMP: ABNORMAL
POC PH: 7.27 (ref 7.35–7.45)
POC PO2 TEMP: ABNORMAL MM HG
POC PO2: 48.3 MM HG (ref 83–108)
POSITIVE BASE EXCESS, ART: 14 (ref 0–3)
POTASSIUM SERPL-SCNC: 4.1 MMOL/L (ref 3.7–5.3)
RBC # BLD: 4.53 M/UL (ref 4.5–5.9)
RBC # BLD: ABNORMAL 10*6/UL
SAMPLE SITE: ABNORMAL
SARS-COV-2, RAPID: NOT DETECTED
SEG NEUTROPHILS: 77 % (ref 39–75)
SEGMENTED NEUTROPHILS ABSOLUTE COUNT: 6.6 K/UL (ref 2.1–6.5)
SODIUM BLD-SCNC: 138 MMOL/L (ref 135–144)
SPECIMEN DESCRIPTION: NORMAL
TCO2 (CALC), ART: ABNORMAL MMOL/L (ref 22–29)
WBC # BLD: 8.6 K/UL (ref 3.5–11)
WBC # BLD: ABNORMAL 10*3/UL

## 2021-11-09 PROCEDURE — 85025 COMPLETE CBC W/AUTO DIFF WBC: CPT

## 2021-11-09 PROCEDURE — 87635 SARS-COV-2 COVID-19 AMP PRB: CPT

## 2021-11-09 PROCEDURE — 2580000003 HC RX 258: Performed by: EMERGENCY MEDICINE

## 2021-11-09 PROCEDURE — 99285 EMERGENCY DEPT VISIT HI MDM: CPT

## 2021-11-09 PROCEDURE — 6360000002 HC RX W HCPCS: Performed by: EMERGENCY MEDICINE

## 2021-11-09 PROCEDURE — 36600 WITHDRAWAL OF ARTERIAL BLOOD: CPT

## 2021-11-09 PROCEDURE — 83605 ASSAY OF LACTIC ACID: CPT

## 2021-11-09 PROCEDURE — C9803 HOPD COVID-19 SPEC COLLECT: HCPCS

## 2021-11-09 PROCEDURE — 96374 THER/PROPH/DIAG INJ IV PUSH: CPT

## 2021-11-09 PROCEDURE — 80048 BASIC METABOLIC PNL TOTAL CA: CPT

## 2021-11-09 PROCEDURE — 82375 ASSAY CARBOXYHB QUANT: CPT

## 2021-11-09 PROCEDURE — 82803 BLOOD GASES ANY COMBINATION: CPT

## 2021-11-09 PROCEDURE — 71045 X-RAY EXAM CHEST 1 VIEW: CPT

## 2021-11-09 PROCEDURE — 94660 CPAP INITIATION&MGMT: CPT

## 2021-11-09 PROCEDURE — 96375 TX/PRO/DX INJ NEW DRUG ADDON: CPT

## 2021-11-09 RX ORDER — MORPHINE SULFATE 4 MG/ML
4 INJECTION, SOLUTION INTRAMUSCULAR; INTRAVENOUS ONCE
Status: COMPLETED | OUTPATIENT
Start: 2021-11-09 | End: 2021-11-09

## 2021-11-09 RX ORDER — 0.9 % SODIUM CHLORIDE 0.9 %
1000 INTRAVENOUS SOLUTION INTRAVENOUS ONCE
Status: COMPLETED | OUTPATIENT
Start: 2021-11-09 | End: 2021-11-09

## 2021-11-09 RX ORDER — ONDANSETRON 2 MG/ML
4 INJECTION INTRAMUSCULAR; INTRAVENOUS ONCE
Status: COMPLETED | OUTPATIENT
Start: 2021-11-09 | End: 2021-11-09

## 2021-11-09 RX ADMIN — ONDANSETRON 4 MG: 2 INJECTION INTRAMUSCULAR; INTRAVENOUS at 18:53

## 2021-11-09 RX ADMIN — SODIUM CHLORIDE 1000 ML: 9 INJECTION, SOLUTION INTRAVENOUS at 18:43

## 2021-11-09 RX ADMIN — MORPHINE SULFATE 4 MG: 4 INJECTION, SOLUTION INTRAMUSCULAR; INTRAVENOUS at 18:53

## 2021-11-09 ASSESSMENT — PAIN DESCRIPTION - FREQUENCY: FREQUENCY: CONTINUOUS

## 2021-11-09 ASSESSMENT — PAIN SCALES - GENERAL
PAINLEVEL_OUTOF10: 10
PAINLEVEL_OUTOF10: 10

## 2021-11-09 ASSESSMENT — PAIN DESCRIPTION - ORIENTATION: ORIENTATION: RIGHT

## 2021-11-09 ASSESSMENT — PAIN DESCRIPTION - LOCATION: LOCATION: LEG

## 2021-11-09 ASSESSMENT — PAIN DESCRIPTION - PAIN TYPE: TYPE: ACUTE PAIN

## 2021-11-09 ASSESSMENT — PAIN DESCRIPTION - ONSET: ONSET: SUDDEN

## 2021-11-09 ASSESSMENT — PAIN DESCRIPTION - PROGRESSION: CLINICAL_PROGRESSION: NOT CHANGED

## 2021-11-09 ASSESSMENT — PAIN - FUNCTIONAL ASSESSMENT: PAIN_FUNCTIONAL_ASSESSMENT: PREVENTS OR INTERFERES WITH MANY ACTIVE NOT PASSIVE ACTIVITIES

## 2021-11-09 ASSESSMENT — PAIN DESCRIPTION - DESCRIPTORS: DESCRIPTORS: BURNING
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
## 2021-11-10 VITALS
RESPIRATION RATE: 24 BRPM | HEART RATE: 103 BPM | TEMPERATURE: 99 F | WEIGHT: 315 LBS | BODY MASS INDEX: 51.36 KG/M2 | SYSTOLIC BLOOD PRESSURE: 116 MMHG | OXYGEN SATURATION: 96 % | DIASTOLIC BLOOD PRESSURE: 71 MMHG

## 2022-09-03 NOTE — PROCEDURES
[email or web address]  Job#: [id number]     Doc#: [id number]    CC:  Katrina Yeung
LLQ pain w nausea and diarrhea since Tuesday w blood in the stool, and lower back pain, no dysuria/ fever

## 2022-10-06 ENCOUNTER — APPOINTMENT (OUTPATIENT)
Dept: GENERAL RADIOLOGY | Age: 57
End: 2022-10-06
Payer: MEDICARE

## 2022-10-06 ENCOUNTER — HOSPITAL ENCOUNTER (EMERGENCY)
Age: 57
Discharge: ANOTHER HOSPITAL | End: 2022-10-06
Attending: EMERGENCY MEDICINE
Payer: MEDICARE

## 2022-10-06 VITALS
HEIGHT: 74 IN | WEIGHT: 315 LBS | SYSTOLIC BLOOD PRESSURE: 107 MMHG | RESPIRATION RATE: 18 BRPM | DIASTOLIC BLOOD PRESSURE: 66 MMHG | TEMPERATURE: 98.4 F | OXYGEN SATURATION: 92 % | HEART RATE: 89 BPM | BODY MASS INDEX: 40.43 KG/M2

## 2022-10-06 DIAGNOSIS — Z86.19 HISTORY OF HEPATITIS C: ICD-10-CM

## 2022-10-06 DIAGNOSIS — D72.829 LEUKOCYTOSIS, UNSPECIFIED TYPE: ICD-10-CM

## 2022-10-06 DIAGNOSIS — R50.9 FEVER, UNSPECIFIED FEVER CAUSE: Primary | ICD-10-CM

## 2022-10-06 DIAGNOSIS — E87.20 LACTIC ACIDOSIS: ICD-10-CM

## 2022-10-06 DIAGNOSIS — A41.9 SEPTICEMIA (HCC): ICD-10-CM

## 2022-10-06 DIAGNOSIS — R74.8 ELEVATED LIVER ENZYMES: ICD-10-CM

## 2022-10-06 LAB
-: NORMAL
ABSOLUTE EOS #: 0 K/UL (ref 0–0.4)
ABSOLUTE LYMPH #: 1 K/UL (ref 1–4.8)
ABSOLUTE MONO #: 1.2 K/UL (ref 0–1)
ALBUMIN SERPL-MCNC: 3.3 G/DL (ref 3.5–5.2)
ALP BLD-CCNC: 103 U/L (ref 40–129)
ALT SERPL-CCNC: 75 U/L (ref 5–41)
ANION GAP SERPL CALCULATED.3IONS-SCNC: 6 MMOL/L (ref 9–17)
AST SERPL-CCNC: 54 U/L
BASOPHILS # BLD: 0 % (ref 0–2)
BASOPHILS ABSOLUTE: 0 K/UL (ref 0–0.2)
BILIRUB SERPL-MCNC: 0.7 MG/DL (ref 0.3–1.2)
BILIRUBIN URINE: NEGATIVE
BUN BLDV-MCNC: 16 MG/DL (ref 6–20)
BUN/CREAT BLD: 18 (ref 9–20)
CALCIUM SERPL-MCNC: 8.9 MG/DL (ref 8.6–10.4)
CHLORIDE BLD-SCNC: 90 MMOL/L (ref 98–107)
CO2: 36 MMOL/L (ref 20–31)
COLOR: YELLOW
COMMENT UA: ABNORMAL
CREAT SERPL-MCNC: 0.87 MG/DL (ref 0.7–1.2)
DIFFERENTIAL TYPE: YES
EOSINOPHILS RELATIVE PERCENT: 0 % (ref 0–5)
EPITHELIAL CELLS UA: NORMAL /HPF
FLU A ANTIGEN: NEGATIVE
FLU B ANTIGEN: NEGATIVE
GFR SERPL CREATININE-BSD FRML MDRD: >60 ML/MIN/1.73M2
GLUCOSE BLD-MCNC: 184 MG/DL (ref 70–99)
GLUCOSE URINE: NEGATIVE
HCT VFR BLD CALC: 35.4 % (ref 41–53)
HEMOGLOBIN: 11.5 G/DL (ref 13.5–17.5)
KETONES, URINE: NEGATIVE
LACTIC ACID, SEPSIS: 2.4 MMOL/L (ref 0.5–1.9)
LACTIC ACID, SEPSIS: 2.5 MMOL/L (ref 0.5–1.9)
LEUKOCYTE ESTERASE, URINE: NEGATIVE
LYMPHOCYTES # BLD: 6 % (ref 13–44)
MCH RBC QN AUTO: 27.1 PG (ref 26–34)
MCHC RBC AUTO-ENTMCNC: 32.6 G/DL (ref 31–37)
MCV RBC AUTO: 83.1 FL (ref 80–100)
MONOCYTES # BLD: 7 % (ref 5–9)
NITRITE, URINE: NEGATIVE
PDW BLD-RTO: 15.6 % (ref 12.1–15.2)
PH UA: 7 (ref 5–8)
PLATELET # BLD: 219 K/UL (ref 140–450)
POTASSIUM SERPL-SCNC: 4.5 MMOL/L (ref 3.7–5.3)
PROTEIN UA: NEGATIVE
RBC # BLD: 4.26 M/UL (ref 4.5–5.9)
RBC UA: NORMAL /HPF (ref 0–2)
SARS-COV-2, RAPID: NOT DETECTED
SEG NEUTROPHILS: 87 % (ref 39–75)
SEGMENTED NEUTROPHILS ABSOLUTE COUNT: 15.4 K/UL (ref 2.1–6.5)
SODIUM BLD-SCNC: 132 MMOL/L (ref 135–144)
SPECIFIC GRAVITY UA: 1.01 (ref 1–1.03)
SPECIMEN DESCRIPTION: NORMAL
TOTAL PROTEIN: 8.7 G/DL (ref 6.4–8.3)
TURBIDITY: ABNORMAL
URINE HGB: ABNORMAL
UROBILINOGEN, URINE: NORMAL
WBC # BLD: 17.6 K/UL (ref 3.5–11)
WBC UA: NORMAL /HPF

## 2022-10-06 PROCEDURE — 96365 THER/PROPH/DIAG IV INF INIT: CPT

## 2022-10-06 PROCEDURE — 6370000000 HC RX 637 (ALT 250 FOR IP): Performed by: EMERGENCY MEDICINE

## 2022-10-06 PROCEDURE — 87635 SARS-COV-2 COVID-19 AMP PRB: CPT

## 2022-10-06 PROCEDURE — 85025 COMPLETE CBC W/AUTO DIFF WBC: CPT

## 2022-10-06 PROCEDURE — 96366 THER/PROPH/DIAG IV INF ADDON: CPT

## 2022-10-06 PROCEDURE — 99285 EMERGENCY DEPT VISIT HI MDM: CPT

## 2022-10-06 PROCEDURE — 71045 X-RAY EXAM CHEST 1 VIEW: CPT

## 2022-10-06 PROCEDURE — 83605 ASSAY OF LACTIC ACID: CPT

## 2022-10-06 PROCEDURE — 81001 URINALYSIS AUTO W/SCOPE: CPT

## 2022-10-06 PROCEDURE — 87040 BLOOD CULTURE FOR BACTERIA: CPT

## 2022-10-06 PROCEDURE — 6360000002 HC RX W HCPCS: Performed by: EMERGENCY MEDICINE

## 2022-10-06 PROCEDURE — 2580000003 HC RX 258: Performed by: EMERGENCY MEDICINE

## 2022-10-06 PROCEDURE — 80053 COMPREHEN METABOLIC PANEL: CPT

## 2022-10-06 PROCEDURE — 96361 HYDRATE IV INFUSION ADD-ON: CPT

## 2022-10-06 PROCEDURE — 87804 INFLUENZA ASSAY W/OPTIC: CPT

## 2022-10-06 PROCEDURE — 36415 COLL VENOUS BLD VENIPUNCTURE: CPT

## 2022-10-06 RX ORDER — IBUPROFEN 200 MG
600 TABLET ORAL ONCE
Status: COMPLETED | OUTPATIENT
Start: 2022-10-06 | End: 2022-10-06

## 2022-10-06 RX ORDER — 0.9 % SODIUM CHLORIDE 0.9 %
500 INTRAVENOUS SOLUTION INTRAVENOUS ONCE
Status: COMPLETED | OUTPATIENT
Start: 2022-10-06 | End: 2022-10-06

## 2022-10-06 RX ORDER — FUROSEMIDE 80 MG
80 TABLET ORAL DAILY
COMMUNITY

## 2022-10-06 RX ORDER — 0.9 % SODIUM CHLORIDE 0.9 %
1000 INTRAVENOUS SOLUTION INTRAVENOUS ONCE
Status: COMPLETED | OUTPATIENT
Start: 2022-10-06 | End: 2022-10-06

## 2022-10-06 RX ORDER — POTASSIUM CHLORIDE 1500 MG/1
TABLET, FILM COATED, EXTENDED RELEASE ORAL
COMMUNITY
Start: 2022-09-23

## 2022-10-06 RX ORDER — ATENOLOL 100 MG/1
100 TABLET ORAL DAILY
COMMUNITY

## 2022-10-06 RX ORDER — MELOXICAM 15 MG/1
TABLET ORAL
COMMUNITY
Start: 2022-09-23

## 2022-10-06 RX ORDER — NALOXONE HYDROCHLORIDE 4 MG/.1ML
4 SPRAY NASAL PRN
COMMUNITY
Start: 2021-11-19

## 2022-10-06 RX ORDER — TRIAMTERENE AND HYDROCHLOROTHIAZIDE 37.5; 25 MG/1; MG/1
1 TABLET ORAL DAILY
COMMUNITY

## 2022-10-06 RX ORDER — IPRATROPIUM BROMIDE AND ALBUTEROL SULFATE 2.5; .5 MG/3ML; MG/3ML
SOLUTION RESPIRATORY (INHALATION)
COMMUNITY
Start: 2022-09-29

## 2022-10-06 RX ORDER — FUROSEMIDE 40 MG/1
TABLET ORAL
COMMUNITY
Start: 2022-09-23

## 2022-10-06 RX ORDER — FUROSEMIDE 80 MG
TABLET ORAL
COMMUNITY
Start: 2022-09-23 | End: 2022-10-06

## 2022-10-06 RX ORDER — DOXYCYCLINE HYCLATE 100 MG/1
CAPSULE ORAL
COMMUNITY
Start: 2022-07-20

## 2022-10-06 RX ADMIN — PIPERACILLIN SODIUM AND TAZOBACTAM SODIUM 4500 MG: 4; .5 INJECTION, POWDER, LYOPHILIZED, FOR SOLUTION INTRAVENOUS at 12:22

## 2022-10-06 RX ADMIN — SODIUM CHLORIDE 1000 ML: 9 INJECTION, SOLUTION INTRAVENOUS at 12:17

## 2022-10-06 RX ADMIN — IBUPROFEN 600 MG: 200 TABLET, FILM COATED ORAL at 11:32

## 2022-10-06 RX ADMIN — VANCOMYCIN HYDROCHLORIDE 3000 MG: 1 INJECTION, POWDER, LYOPHILIZED, FOR SOLUTION INTRAVENOUS at 13:11

## 2022-10-06 RX ADMIN — SODIUM CHLORIDE 500 ML: 9 INJECTION, SOLUTION INTRAVENOUS at 14:34

## 2022-10-06 ASSESSMENT — PAIN DESCRIPTION - ORIENTATION
ORIENTATION: LOWER;MID
ORIENTATION: LOWER;MID
ORIENTATION: LOWER

## 2022-10-06 ASSESSMENT — PAIN - FUNCTIONAL ASSESSMENT
PAIN_FUNCTIONAL_ASSESSMENT: PREVENTS OR INTERFERES SOME ACTIVE ACTIVITIES AND ADLS
PAIN_FUNCTIONAL_ASSESSMENT: PREVENTS OR INTERFERES SOME ACTIVE ACTIVITIES AND ADLS
PAIN_FUNCTIONAL_ASSESSMENT: 0-10
PAIN_FUNCTIONAL_ASSESSMENT: ACTIVITIES ARE NOT PREVENTED

## 2022-10-06 ASSESSMENT — PAIN SCALES - GENERAL
PAINLEVEL_OUTOF10: 3
PAINLEVEL_OUTOF10: 4
PAINLEVEL_OUTOF10: 7

## 2022-10-06 ASSESSMENT — ENCOUNTER SYMPTOMS
DIARRHEA: 0
BACK PAIN: 0
SHORTNESS OF BREATH: 0
CHEST TIGHTNESS: 0
COUGH: 0
NAUSEA: 0
VOMITING: 0
ABDOMINAL PAIN: 0
WHEEZING: 0

## 2022-10-06 ASSESSMENT — LIFESTYLE VARIABLES
HOW OFTEN DO YOU HAVE A DRINK CONTAINING ALCOHOL: NEVER
HOW MANY STANDARD DRINKS CONTAINING ALCOHOL DO YOU HAVE ON A TYPICAL DAY: PATIENT DOES NOT DRINK

## 2022-10-06 ASSESSMENT — PAIN DESCRIPTION - FREQUENCY
FREQUENCY: CONTINUOUS
FREQUENCY: CONTINUOUS

## 2022-10-06 ASSESSMENT — PAIN DESCRIPTION - PAIN TYPE
TYPE: CHRONIC PAIN
TYPE: CHRONIC PAIN

## 2022-10-06 ASSESSMENT — PAIN DESCRIPTION - DESCRIPTORS
DESCRIPTORS: ACHING

## 2022-10-06 ASSESSMENT — PAIN DESCRIPTION - LOCATION
LOCATION: BACK

## 2022-10-06 NOTE — ED PROVIDER NOTES
@@  eMERGENCY dEPARTMENT eNCOUnter      Pt Name: Chris King  MRN: [id number]  [place] 1965  Date of evaluation: 10/6/2022  Provider: Tracey Llanos DO    CHIEF COMPLAINT       Chief Complaint   Patient presents with    Fever         HISTORYOF PRESENT ILLNESS   (Location/Symptom, Timing/Onset, Context/Setting, Quality, Duration, ModifyingFactors, Severity) Note limiting factors. HPI    Chris King is a 65 y.o. @SEX@ who presents to the emergency department fever. Patient has no complaints otherwise. States they took his temperature because he felt warm to them. But he denies any nausea, vomiting, abdominal pain, urinary symptoms, URI symptoms. They tested him for COVID and it was negative. He had blood work the other day but not sure why. States they also did a chest x-ray but again he is not sure why. He always wears 6 L of oxygen for his COPD. [facility]. Chronic lymphedema. Has daily dressing changes to the right leg. States nothing has changed in regards to his skin on that right leg. Nursing Notes were reviewed. REVIEW OF SYSTEMS    (2+ for level 4; 10+ for level 5)   Review of Systems   Constitutional:  Positive for fever. Negative for activity change, chills, diaphoresis and fatigue. Respiratory:  Negative for cough, chest tightness, shortness of breath and wheezing. Cardiovascular:  Negative for chest pain, palpitations and leg swelling. Gastrointestinal:  Negative for abdominal pain, diarrhea, nausea and vomiting. Genitourinary:  Negative for dysuria, flank pain and frequency. Musculoskeletal:  Negative for back pain, myalgias and neck pain. Skin:  Negative for pallor, rash and wound. Neurological:  Negative for dizziness, syncope, light-headedness and headaches.      PAST MEDICAL HISTORY     Past Medical History:   Diagnosis Date    Addiction to drug [facility])     CAD (coronary artery disease)     COPD (chronic obstructive pulmonary disease) [facility])     Hypertension     Kidney failure, acute ([facility] [address]     due to mrsa, reversed    MRSA (methicillin resistant Staphylococcus aureus)     Patient requiring acute dialysis [facility]) 2010    D/T organ failure from MRSA       SURGICAL HISTORY       Past Surgical History:   Procedure Laterality Date    ABOVE KNEE AMPUTATION Left     ANKLE SURGERY      right; s/p crush injury    CARDIAC SURGERY      valve replacement    JOINT REPLACEMENT  knee replacement, removed, MRSA    KNEE SURGERY      several on lt    LUNG SURGERY         CURRENT MEDICATIONS     [unfilled]    ALLERGIES     Patient has no known allergies. FAMILY HISTORY       Family History   Problem Relation Age of Onset    Cancer Mother         SOCIAL HISTORY       Social History     Socioeconomic History    Marital status: Legally      Spouse name: None    Number of children: None    Years of education: None    Highest education level: None   Tobacco Use    Smoking status: Former     Packs/day: 0.75     Types: Cigarettes     Quit date: 2020     Years since quittin.4    Smokeless tobacco: Never   Vaping Use    Vaping Use: Every day    Substances: Nicotine   Substance and Sexual Activity    Alcohol use: No    Drug use: Not Currently     Types: Opiates , IV     Comment: heroin       SCREENINGS    Allie Coma Scale  Eye Opening: Spontaneous  Best Verbal Response: Oriented  Best Motor Response: Obeys commands  [place] Coma Scale Score: 15      PHYSICAL EXAM    (up to 7 forlevel 4, 8 or more for level 5)     ED Triage Vitals   BP Temp Temp src Pulse Resp SpO2 Height Weight   -- -- -- -- -- -- -- --       Physical Exam  Vitals and nursing note reviewed. Constitutional:       General: He is not in acute distress. Appearance: He is well-developed. He is obese. He is not ill-appearing, toxic-appearing or diaphoretic. HENT:      Head: Normocephalic and atraumatic.       Mouth/Throat:      Mouth: Mucous membranes are moist.   Eyes: General:         Right eye: No discharge. Left eye: No discharge. Conjunctiva/sclera: Conjunctivae normal.      Pupils: Pupils are equal, round, and reactive to light. Neck:      Trachea: Trachea normal.   Cardiovascular:      Rate and Rhythm: Regular rhythm. Tachycardia present. Heart sounds: Normal heart sounds. Pulmonary:      Effort: Pulmonary effort is normal. No respiratory distress. Breath sounds: Normal breath sounds. No stridor. No wheezing, rhonchi or rales. Chest:      Chest wall: No tenderness. Abdominal:      General: Bowel sounds are normal. There is no distension. Palpations: Abdomen is soft. There is no mass. Tenderness: There is no abdominal tenderness. There is no guarding or rebound. Musculoskeletal:         General: No tenderness or deformity. Normal range of motion. Cervical back: Normal range of motion and neck supple. No rigidity. No spinous process tenderness or muscular tenderness. Comments: Left BKA. Right chronic lymphedema with chronic skin changes. States the right leg always has diffuse erythema. No changes to the area according to the patient   Skin:     General: Skin is warm and dry. Coloration: Skin is not pale. Findings: No erythema or rash. Neurological:      Mental Status: He is alert and oriented to person, place, and time. GCS: GCS eye subscore is 4. GCS verbal subscore is 5. GCS motor subscore is 6. Psychiatric:         Mood and Affect: Mood normal.         Behavior: Behavior normal.       DIAGNOSTIC RESULTS     EKG (Per Emergency Physician):       RADIOLOGY (Per Emergency Physician): Interpretation per the Radiologist below, ifavailable at the time of this note:  XR CHEST PORTABLE    Result Date: 10/6/2022  EXAM: XR CHEST PORTABLE HISTORY: Reason for exam:->fever 43-year-old male. COMPARISON: Portable chest 11/9/2021. TECHNIQUE: Portable chest 1111 hours.  FINDINGS: Chronic pulmonary vascular congestion, left greater than right is again seen. Cardiomegaly. Previous sternotomy. Shallow breath. The appearance is more consistent with CHF than pneumonia. ED BEDSIDE ULTRASOUND:   Performed by ED Physician - none    LABS:  Labs Reviewed   CBC WITH AUTO DIFFERENTIAL - Abnormal; Notable for the following components:       Result Value    WBC 17.6 (*)     RBC 4.26 (*)     Hemoglobin 11.5 (*)     Hematocrit 35.4 (*)     RDW 15.6 (*)     Seg Neutrophils 87 (*)     Lymphocytes 6 (*)     Segs Absolute 15.40 (*)     Absolute Mono # 1.20 (*)     All other components within normal limits   COMPREHENSIVE METABOLIC PANEL - Abnormal; Notable for the following components:    Glucose 184 (*)     Sodium 132 (*)     Chloride 90 (*)     CO2 36 (*)     Anion Gap 6 (*)     ALT 75 (*)     AST 54 (*)     Total Protein 8.7 (*)     Albumin 3.3 (*)     All other components within normal limits   URINALYSIS - Abnormal; Notable for the following components:    Turbidity UA Hazy (*)     Urine Hgb 2+ (*)     All other components within normal limits   LACTATE, SEPSIS - Abnormal; Notable for the following components:    Lactic Acid, Sepsis 2.4 (*)     All other components within normal limits   LACTATE, SEPSIS - Abnormal; Notable for the following components:    Lactic Acid, Sepsis 2.5 (*)     All other components within normal limits   RAPID INFLUENZA A/B ANTIGENS   COVID-19, RAPID   CULTURE, BLOOD 1   CULTURE, BLOOD 1   MICROSCOPIC URINALYSIS        All other labs were within normal range or not returned as of this dictation.     EMERGENCY DEPARTMENT COURSE and DIFFERENTIALDIAGNOSIS/MDM:   Vitals:    Vitals:    10/06/22 1042 10/06/22 1113 10/06/22 1116 10/06/22 1215   BP: 136/72   (!) 162/78   Pulse: (!) 101   100   Resp: 20   20   Temp: (!) 102.8 °F (39.3 °C)      TempSrc: Oral      SpO2: 94%   91%   Weight:  (!) 443 lb 3.2 oz (201 kg) (!) 443 lb 3.2 oz (201 kg)    Height: 6' 2\" (1.88 m) 6' 2\" (1.88 m) 6' 2\" (1.88 m) Medications   vancomycin (VANCOCIN) 3,000 mg in dextrose 5 % 500 mL IVPB (3,000 mg IntraVENous New Bag 10/6/22 1311)   0.9 % sodium chloride bolus (has no administration in time range)   0.9 % sodium chloride bolus (1,000 mLs IntraVENous New Bag 10/6/22 1217)   ibuprofen (ADVIL;MOTRIN) tablet 600 mg (600 mg Oral Given 10/6/22 1132)   piperacillin-tazobactam (ZOSYN) 4,500 mg in dextrose 5 % 100 mL IVPB (mini-bag) (0 mg IntraVENous Stopped 10/6/22 1308)       MDM  Patient presented for fever. He was asymptomatic upon arrival.  On his baseline oxygen level. Remains above 90%. Lab work reviewed. Does have leukocytosis of 17,000 with a lactate of 2.4. Delay in starting IV therefore IV fluids were delayed. Patient difficult IV stick. Repeat lactate came back at 2.5 from 2.4 however he had only received partial first bolus. Will not give the full 30 cc/kg as that would be 6000 cc of IV fluids based off of his weight. Patient chart does not show a history of CHF but he states he has been told he has it. Therefore will not give him the full 6000 cc. Darted on vancomycin and Zosyn for questionable pneumonia on chest x-ray which also says possible CHF. We will treat for pneumonia based off of the fever and the leukocytosis. Discussed with Dr. Gerard Bustillo he needed a higher level of care than we can provide here at [facility].  Patient requesting transfer to [facility]. REVAL:         CRITICAL CARE TIME   Total Critical Care time was 45 minutes, excluding separatelyreportable procedures. There was a high probability ofclinically significant/life threatening deterioration in the patient's condition which required my urgent intervention. CONSULTS:  [unfilled]    PROCEDURES:  Unless otherwise noted below, none     Procedures    FINAL IMPRESSION      1. Fever, unspecified fever cause    2. Leukocytosis, unspecified type    3. Lactic acidosis    4. Elevated liver enzymes    5.  History of hepatitis C    6. Septicemia [facility])          DISPOSITION/PLAN   DISPOSITION Decision To Transfer 10/06/2022 02:21:23 PM      PATIENT REFERRED TO:  No follow-up provider specified. DISCHARGE MEDICATIONS:  New Prescriptions    No medications on file              Summation      Patient Course:     ED Medications administered this visit:    Medications   vancomycin (VANCOCIN) 3,000 mg in dextrose 5 % 500 mL IVPB (3,000 mg IntraVENous New Bag 10/6/22 1311)   0.9 % sodium chloride bolus (has no administration in time range)   0.9 % sodium chloride bolus (1,000 mLs IntraVENous New Bag 10/6/22 1217)   ibuprofen (ADVIL;MOTRIN) tablet 600 mg (600 mg Oral Given 10/6/22 1132)   piperacillin-tazobactam (ZOSYN) 4,500 mg in dextrose 5 % 100 mL IVPB (mini-bag) (0 mg IntraVENous Stopped 10/6/22 1308)       New Prescriptions from this visit:    New Prescriptions    No medications on file       Follow-up:  No follow-up provider specified. Final Impression:  1. Fever, unspecified fever cause    2. Leukocytosis, unspecified type    3. Lactic acidosis    4. Elevated liver enzymes    5. History of hepatitis C    6. Septicemia ([facility] [address]                   (Please note:  Portions of this note were completed with a voicerecognition program.  Efforts were made to edit the dictations but occasionally words and phrases are mis-transcribed.)  Form v2016. J.5-cn    Maycol Granda DO (electronically signed)  Emergency Medicine Provider           Maycol Granda DO  10/06/22 6862

## 2023-01-01 ENCOUNTER — APPOINTMENT (OUTPATIENT)
Dept: GENERAL RADIOLOGY | Age: 58
End: 2023-01-01
Payer: MEDICARE

## 2023-01-01 ENCOUNTER — APPOINTMENT (OUTPATIENT)
Age: 58
End: 2023-01-01
Payer: MEDICARE

## 2023-01-01 ENCOUNTER — HOSPITAL ENCOUNTER (INPATIENT)
Age: 58
LOS: 11 days | End: 2023-12-29
Attending: EMERGENCY MEDICINE | Admitting: INTERNAL MEDICINE
Payer: MEDICARE

## 2023-01-01 ENCOUNTER — HOSPITAL ENCOUNTER (EMERGENCY)
Age: 58
Discharge: ANOTHER HOSPITAL | End: 2023-12-17
Attending: EMERGENCY MEDICINE
Payer: MEDICARE

## 2023-01-01 ENCOUNTER — APPOINTMENT (OUTPATIENT)
Dept: CT IMAGING | Age: 58
End: 2023-01-01
Payer: MEDICARE

## 2023-01-01 VITALS
BODY MASS INDEX: 39.17 KG/M2 | SYSTOLIC BLOOD PRESSURE: 130 MMHG | DIASTOLIC BLOOD PRESSURE: 68 MMHG | OXYGEN SATURATION: 61 % | HEIGHT: 75 IN | RESPIRATION RATE: 28 BRPM | TEMPERATURE: 100.9 F | WEIGHT: 315 LBS

## 2023-01-01 VITALS
TEMPERATURE: 99.6 F | WEIGHT: 315 LBS | OXYGEN SATURATION: 100 % | DIASTOLIC BLOOD PRESSURE: 62 MMHG | HEART RATE: 81 BPM | HEIGHT: 77 IN | RESPIRATION RATE: 10 BRPM | BODY MASS INDEX: 37.19 KG/M2 | SYSTOLIC BLOOD PRESSURE: 109 MMHG

## 2023-01-01 DIAGNOSIS — J96.21 ACUTE ON CHRONIC RESPIRATORY FAILURE WITH HYPOXIA (HCC): ICD-10-CM

## 2023-01-01 DIAGNOSIS — J96.00 ACUTE RESPIRATORY FAILURE, UNSPECIFIED WHETHER WITH HYPOXIA OR HYPERCAPNIA (HCC): Primary | ICD-10-CM

## 2023-01-01 DIAGNOSIS — J96.01 ACUTE RESPIRATORY FAILURE WITH HYPOXIA AND HYPERCAPNIA (HCC): ICD-10-CM

## 2023-01-01 DIAGNOSIS — U07.1 COVID-19: Primary | ICD-10-CM

## 2023-01-01 DIAGNOSIS — J96.02 ACUTE RESPIRATORY FAILURE WITH HYPOXIA AND HYPERCAPNIA (HCC): ICD-10-CM

## 2023-01-01 DIAGNOSIS — R41.82 ALTERED MENTAL STATUS, UNSPECIFIED ALTERED MENTAL STATUS TYPE: ICD-10-CM

## 2023-01-01 DIAGNOSIS — U07.1 COVID-19: ICD-10-CM

## 2023-01-01 LAB
-: NORMAL
ALBUMIN SERPL-MCNC: 2.1 G/DL (ref 3.5–5.2)
ALBUMIN SERPL-MCNC: 2.2 G/DL (ref 3.5–5.2)
ALBUMIN SERPL-MCNC: 2.6 G/DL (ref 3.5–5.2)
ALBUMIN SERPL-MCNC: 2.6 G/DL (ref 3.5–5.2)
ALBUMIN SERPL-MCNC: 2.8 G/DL (ref 3.5–5.2)
ALBUMIN/GLOB SERPL: 0.4 {RATIO} (ref 1–2.5)
ALBUMIN/GLOB SERPL: 0.5 {RATIO} (ref 1–2.5)
ALBUMIN/GLOB SERPL: 0.6 {RATIO} (ref 1–2.5)
ALBUMIN/GLOB SERPL: 0.6 {RATIO} (ref 1–2.5)
ALLEN TEST: POSITIVE
ALP SERPL-CCNC: 101 U/L (ref 40–129)
ALP SERPL-CCNC: 110 U/L (ref 40–129)
ALP SERPL-CCNC: 73 U/L (ref 40–129)
ALP SERPL-CCNC: 75 U/L (ref 40–129)
ALP SERPL-CCNC: 91 U/L (ref 40–129)
ALT SERPL-CCNC: 33 U/L (ref 5–41)
ALT SERPL-CCNC: 44 U/L (ref 5–41)
ALT SERPL-CCNC: 51 U/L (ref 5–41)
ALT SERPL-CCNC: 55 U/L (ref 5–41)
ALT SERPL-CCNC: 58 U/L (ref 5–41)
AMMONIA PLAS-SCNC: 57 UMOL/L (ref 16–60)
AMPHET UR QL SCN: NEGATIVE
ANION GAP SERPL CALCULATED.3IONS-SCNC: 3 MMOL/L (ref 9–17)
ANION GAP SERPL CALCULATED.3IONS-SCNC: 4 MMOL/L (ref 9–17)
ANION GAP SERPL CALCULATED.3IONS-SCNC: 5 MMOL/L (ref 9–17)
ANION GAP SERPL CALCULATED.3IONS-SCNC: 5 MMOL/L (ref 9–17)
ANION GAP SERPL CALCULATED.3IONS-SCNC: 6 MMOL/L (ref 9–17)
ANION GAP SERPL CALCULATED.3IONS-SCNC: 7 MMOL/L (ref 9–17)
ANION GAP SERPL CALCULATED.3IONS-SCNC: 7 MMOL/L (ref 9–17)
ANION GAP SERPL CALCULATED.3IONS-SCNC: 8 MMOL/L (ref 9–17)
APAP SERPL-MCNC: <5 UG/ML (ref 10–30)
AST SERPL-CCNC: 169 U/L
AST SERPL-CCNC: 53 U/L
AST SERPL-CCNC: 54 U/L
AST SERPL-CCNC: 58 U/L
AST SERPL-CCNC: 88 U/L
BACTERIA URNS QL MICRO: ABNORMAL
BARBITURATES UR QL SCN: NEGATIVE
BASOPHILS # BLD: 0 K/UL (ref 0–0.2)
BASOPHILS # BLD: 0.03 K/UL (ref 0–0.2)
BASOPHILS # BLD: 0.03 K/UL (ref 0–0.2)
BASOPHILS # BLD: <0.03 K/UL (ref 0–0.2)
BASOPHILS NFR BLD: 0 % (ref 0–2)
BENZODIAZ UR QL: NEGATIVE
BILIRUB DIRECT SERPL-MCNC: 0.1 MG/DL
BILIRUB DIRECT SERPL-MCNC: 0.2 MG/DL
BILIRUB DIRECT SERPL-MCNC: 0.3 MG/DL
BILIRUB INDIRECT SERPL-MCNC: 0.1 MG/DL (ref 0–1)
BILIRUB INDIRECT SERPL-MCNC: 0.2 MG/DL (ref 0–1)
BILIRUB INDIRECT SERPL-MCNC: 0.2 MG/DL (ref 0–1)
BILIRUB SERPL-MCNC: 0.3 MG/DL (ref 0.3–1.2)
BILIRUB SERPL-MCNC: 0.4 MG/DL (ref 0.3–1.2)
BILIRUB SERPL-MCNC: 0.4 MG/DL (ref 0.3–1.2)
BILIRUB SERPL-MCNC: 0.5 MG/DL (ref 0.3–1.2)
BILIRUB SERPL-MCNC: 0.5 MG/DL (ref 0.3–1.2)
BILIRUB UR QL STRIP: NEGATIVE
BILIRUB UR QL STRIP: NEGATIVE
BNP SERPL-MCNC: 363 PG/ML
BODY TEMPERATURE: 37
BUN BLD-MCNC: 23 MG/DL (ref 8–26)
BUN BLD-MCNC: 24 MG/DL (ref 8–26)
BUN BLD-MCNC: 52 MG/DL (ref 8–26)
BUN SERPL-MCNC: 15 MG/DL (ref 6–20)
BUN SERPL-MCNC: 17 MG/DL (ref 6–20)
BUN SERPL-MCNC: 18 MG/DL (ref 6–20)
BUN SERPL-MCNC: 21 MG/DL (ref 6–20)
BUN SERPL-MCNC: 22 MG/DL (ref 6–20)
BUN SERPL-MCNC: 23 MG/DL (ref 6–20)
BUN SERPL-MCNC: 45 MG/DL (ref 6–20)
BUN SERPL-MCNC: 53 MG/DL (ref 6–20)
BUN SERPL-MCNC: 59 MG/DL (ref 6–20)
CA-I BLD-SCNC: 1.07 MMOL/L (ref 1.13–1.33)
CA-I BLD-SCNC: 1.14 MMOL/L (ref 1.15–1.33)
CA-I BLD-SCNC: 1.19 MMOL/L (ref 1.15–1.33)
CA-I BLD-SCNC: 1.21 MMOL/L (ref 1.15–1.33)
CALCIUM SERPL-MCNC: 7.6 MG/DL (ref 8.6–10.4)
CALCIUM SERPL-MCNC: 7.9 MG/DL (ref 8.6–10.4)
CALCIUM SERPL-MCNC: 8 MG/DL (ref 8.6–10.4)
CALCIUM SERPL-MCNC: 8.1 MG/DL (ref 8.6–10.4)
CALCIUM SERPL-MCNC: 8.2 MG/DL (ref 8.6–10.4)
CALCIUM SERPL-MCNC: 8.2 MG/DL (ref 8.6–10.4)
CALCIUM SERPL-MCNC: 8.3 MG/DL (ref 8.6–10.4)
CALCIUM SERPL-MCNC: 8.6 MG/DL (ref 8.6–10.4)
CALCIUM SERPL-MCNC: 8.6 MG/DL (ref 8.6–10.4)
CALCIUM SERPL-MCNC: 8.7 MG/DL (ref 8.6–10.4)
CALCIUM SERPL-MCNC: 8.8 MG/DL (ref 8.6–10.4)
CALCIUM SERPL-MCNC: 9.3 MG/DL (ref 8.6–10.4)
CANNABINOIDS UR QL SCN: NEGATIVE
CASTS #/AREA URNS LPF: ABNORMAL /LPF (ref 0–8)
CHLORIDE BLD-SCNC: 101 MMOL/L (ref 98–107)
CHLORIDE BLD-SCNC: 96 MMOL/L (ref 98–107)
CHLORIDE BLD-SCNC: 97 MMOL/L (ref 98–107)
CHLORIDE SERPL-SCNC: 101 MMOL/L (ref 98–107)
CHLORIDE SERPL-SCNC: 101 MMOL/L (ref 98–107)
CHLORIDE SERPL-SCNC: 102 MMOL/L (ref 98–107)
CHLORIDE SERPL-SCNC: 103 MMOL/L (ref 98–107)
CHLORIDE SERPL-SCNC: 103 MMOL/L (ref 98–107)
CHLORIDE SERPL-SCNC: 104 MMOL/L (ref 98–107)
CHLORIDE SERPL-SCNC: 104 MMOL/L (ref 98–107)
CHLORIDE SERPL-SCNC: 96 MMOL/L (ref 98–107)
CHLORIDE SERPL-SCNC: 97 MMOL/L (ref 98–107)
CHLORIDE SERPL-SCNC: 97 MMOL/L (ref 98–107)
CK SERPL-CCNC: 116 U/L (ref 39–308)
CK SERPL-CCNC: 864 U/L (ref 39–308)
CLARITY UR: ABNORMAL
CLARITY UR: CLEAR
CO2 BLD CALC-SCNC: 37 MMOL/L (ref 22–30)
CO2 BLD CALC-SCNC: 39 MMOL/L (ref 22–30)
CO2 BLD CALC-SCNC: 43 MMOL/L (ref 22–30)
CO2 SERPL-SCNC: 32 MMOL/L (ref 20–31)
CO2 SERPL-SCNC: 33 MMOL/L (ref 20–31)
CO2 SERPL-SCNC: 33 MMOL/L (ref 20–31)
CO2 SERPL-SCNC: 34 MMOL/L (ref 20–31)
CO2 SERPL-SCNC: 34 MMOL/L (ref 20–31)
CO2 SERPL-SCNC: 35 MMOL/L (ref 20–31)
CO2 SERPL-SCNC: 36 MMOL/L (ref 20–31)
CO2 SERPL-SCNC: 36 MMOL/L (ref 20–31)
CO2 SERPL-SCNC: 39 MMOL/L (ref 20–31)
CO2 SERPL-SCNC: 40 MMOL/L (ref 20–31)
CO2 SERPL-SCNC: 41 MMOL/L (ref 20–31)
CO2 SERPL-SCNC: 42 MMOL/L (ref 20–31)
COCAINE UR QL SCN: NEGATIVE
COHGB MFR BLD: 2 % (ref 0–5)
COLOR UR: YELLOW
COLOR UR: YELLOW
COMMENT: ABNORMAL
CREAT SERPL-MCNC: 0.6 MG/DL (ref 0.7–1.2)
CREAT SERPL-MCNC: 0.7 MG/DL (ref 0.7–1.2)
CREAT SERPL-MCNC: 0.9 MG/DL (ref 0.7–1.2)
CREAT SERPL-MCNC: 1 MG/DL (ref 0.7–1.2)
CREAT SERPL-MCNC: 1.1 MG/DL (ref 0.7–1.2)
CREAT SERPL-MCNC: 1.2 MG/DL (ref 0.7–1.2)
CRITICAL ACTION: NORMAL
CRITICAL NOTIFICATION DATE/TIME: NORMAL
CRITICAL NOTIFICATION: NORMAL
CRITICAL VALUE READ BACK: YES
CRP SERPL HS-MCNC: 100.5 MG/L (ref 0–5)
CRP SERPL HS-MCNC: 234.9 MG/L (ref 0–5)
CRP SERPL HS-MCNC: 34.9 MG/L (ref 0–5)
CRP SERPL HS-MCNC: 40.6 MG/L (ref 0–5)
CRP SERPL HS-MCNC: 47.6 MG/L (ref 0–5)
CRP SERPL HS-MCNC: 81.8 MG/L (ref 0–5)
ECHO AO ROOT DIAM: 3.8 CM
ECHO AO ROOT INDEX: 1.19 CM/M2
ECHO AV AREA PEAK VELOCITY: 3.9 CM2
ECHO AV AREA VTI: 4.1 CM2
ECHO AV AREA/BSA PEAK VELOCITY: 1.2 CM2/M2
ECHO AV AREA/BSA VTI: 1.3 CM2/M2
ECHO AV MEAN GRADIENT: 5 MMHG
ECHO AV MEAN VELOCITY: 1 M/S
ECHO AV PEAK GRADIENT: 10 MMHG
ECHO AV PEAK VELOCITY: 1.6 M/S
ECHO AV VELOCITY RATIO: 0.88
ECHO AV VTI: 30 CM
ECHO BSA: 3.4 M2
ECHO LA AREA 2C: 31.6 CM2
ECHO LA AREA 4C: 23.9 CM2
ECHO LA DIAMETER INDEX: 1.54 CM/M2
ECHO LA DIAMETER: 4.9 CM
ECHO LA MAJOR AXIS: 6.9 CM
ECHO LA MINOR AXIS: 7.3 CM
ECHO LA TO AORTIC ROOT RATIO: 1.29
ECHO LA VOL BP: 89 ML (ref 18–58)
ECHO LA VOL MOD A2C: 111 ML (ref 18–58)
ECHO LA VOL MOD A4C: 67 ML (ref 18–58)
ECHO LA VOL/BSA BIPLANE: 28 ML/M2 (ref 16–34)
ECHO LA VOLUME INDEX MOD A2C: 35 ML/M2 (ref 16–34)
ECHO LA VOLUME INDEX MOD A4C: 21 ML/M2 (ref 16–34)
ECHO LV E' LATERAL VELOCITY: 12 CM/S
ECHO LV E' SEPTAL VELOCITY: 8 CM/S
ECHO LV EDV A2C: 114 ML
ECHO LV EDV A4C: 121 ML
ECHO LV EDV INDEX A4C: 38 ML/M2
ECHO LV EDV NDEX A2C: 36 ML/M2
ECHO LV EJECTION FRACTION A2C: 61 %
ECHO LV EJECTION FRACTION A4C: 58 %
ECHO LV EJECTION FRACTION BIPLANE: 61 % (ref 55–100)
ECHO LV ESV A2C: 45 ML
ECHO LV ESV A4C: 51 ML
ECHO LV ESV INDEX A2C: 14 ML/M2
ECHO LV ESV INDEX A4C: 16 ML/M2
ECHO LV FRACTIONAL SHORTENING: 35 % (ref 28–44)
ECHO LV INTERNAL DIMENSION DIASTOLE INDEX: 1.79 CM/M2
ECHO LV INTERNAL DIMENSION DIASTOLIC: 5.7 CM (ref 4.2–5.9)
ECHO LV INTERNAL DIMENSION SYSTOLIC INDEX: 1.16 CM/M2
ECHO LV INTERNAL DIMENSION SYSTOLIC: 3.7 CM
ECHO LV IVSD: 1.5 CM (ref 0.6–1)
ECHO LV MASS 2D: 394.4 G (ref 88–224)
ECHO LV MASS INDEX 2D: 123.6 G/M2 (ref 49–115)
ECHO LV POSTERIOR WALL DIASTOLIC: 1.5 CM (ref 0.6–1)
ECHO LV RELATIVE WALL THICKNESS RATIO: 0.53
ECHO LVOT AREA: 4.5 CM2
ECHO LVOT AV VTI INDEX: 0.9
ECHO LVOT DIAM: 2.4 CM
ECHO LVOT MEAN GRADIENT: 4 MMHG
ECHO LVOT PEAK GRADIENT: 8 MMHG
ECHO LVOT PEAK VELOCITY: 1.4 M/S
ECHO LVOT STROKE VOLUME INDEX: 38.3 ML/M2
ECHO LVOT SV: 122.1 ML
ECHO LVOT VTI: 27 CM
ECHO MV A VELOCITY: 1.11 M/S
ECHO MV AREA VTI: 4.1 CM2
ECHO MV E DECELERATION TIME (DT): 184 MS
ECHO MV E VELOCITY: 1.17 M/S
ECHO MV E/A RATIO: 1.05
ECHO MV E/E' LATERAL: 9.75
ECHO MV E/E' RATIO (AVERAGED): 12.19
ECHO MV LVOT VTI INDEX: 1.09
ECHO MV MAX VELOCITY: 1.5 M/S
ECHO MV MEAN GRADIENT: 4 MMHG
ECHO MV MEAN VELOCITY: 0.9 M/S
ECHO MV PEAK GRADIENT: 9 MMHG
ECHO MV VTI: 29.5 CM
ECHO PV MAX VELOCITY: 1.4 M/S
ECHO PV PEAK GRADIENT: 8 MMHG
ECHO RV BASAL DIMENSION: 4.5 CM
ECHO RV FREE WALL PEAK S': 16 CM/S
ECHO RV TAPSE: 3.1 CM (ref 1.7–?)
ECHO TV MEAN GRADIENT: 9 MMHG
ECHO TV MEAN VELOCITY: 1.4 M/S
ECHO TV PEAK GRADIENT: 19 MMHG
ECHO TV VALVE AREA VTI: 2.3 CM2
ECHO TV VTI: 53.9 CM
EGFR, POC: >60 ML/MIN/1.73M2
EKG ATRIAL RATE: 83 BPM
EKG P AXIS: 2 DEGREES
EKG P-R INTERVAL: 192 MS
EKG Q-T INTERVAL: 360 MS
EKG Q-T INTERVAL: 386 MS
EKG QRS DURATION: 98 MS
EKG QRS DURATION: 98 MS
EKG QTC CALCULATION (BAZETT): 450 MS
EKG QTC CALCULATION (BAZETT): 457 MS
EKG R AXIS: 33 DEGREES
EKG R AXIS: 57 DEGREES
EKG T AXIS: 49 DEGREES
EKG T AXIS: 54 DEGREES
EKG VENTRICULAR RATE: 82 BPM
EKG VENTRICULAR RATE: 97 BPM
EOSINOPHIL # BLD: 0 K/UL (ref 0–0.4)
EOSINOPHIL # BLD: 0 K/UL (ref 0–0.44)
EOSINOPHIL # BLD: 0 K/UL (ref 0–0.44)
EOSINOPHIL # BLD: 0.06 K/UL (ref 0–0.4)
EOSINOPHIL # BLD: 0.16 K/UL (ref 0–0.44)
EOSINOPHIL # BLD: 0.19 K/UL (ref 0–0.44)
EOSINOPHIL # BLD: 0.2 K/UL (ref 0–0.44)
EOSINOPHIL # BLD: 0.27 K/UL (ref 0–0.44)
EOSINOPHIL # BLD: 0.34 K/UL (ref 0–0.44)
EOSINOPHIL # BLD: 0.53 K/UL (ref 0–0.44)
EOSINOPHIL # BLD: <0.03 K/UL (ref 0–0.44)
EOSINOPHILS RELATIVE PERCENT: 0 % (ref 1–4)
EOSINOPHILS RELATIVE PERCENT: 1 % (ref 0–5)
EOSINOPHILS RELATIVE PERCENT: 2 % (ref 1–4)
EOSINOPHILS RELATIVE PERCENT: 3 % (ref 1–4)
EOSINOPHILS RELATIVE PERCENT: 4 % (ref 1–4)
EOSINOPHILS RELATIVE PERCENT: 6 % (ref 1–4)
EPI CELLS #/AREA URNS HPF: ABNORMAL /HPF (ref 0–5)
EPI CELLS #/AREA URNS HPF: NORMAL /HPF
ERYTHROCYTE [DISTWIDTH] IN BLOOD BY AUTOMATED COUNT: 16.8 % (ref 12.1–15.2)
ERYTHROCYTE [DISTWIDTH] IN BLOOD BY AUTOMATED COUNT: 17 % (ref 11.8–14.4)
ERYTHROCYTE [DISTWIDTH] IN BLOOD BY AUTOMATED COUNT: 17 % (ref 11.8–14.4)
ERYTHROCYTE [DISTWIDTH] IN BLOOD BY AUTOMATED COUNT: 17.1 % (ref 11.8–14.4)
ERYTHROCYTE [DISTWIDTH] IN BLOOD BY AUTOMATED COUNT: 17.3 % (ref 11.8–14.4)
ERYTHROCYTE [DISTWIDTH] IN BLOOD BY AUTOMATED COUNT: 17.6 % (ref 11.8–14.4)
ERYTHROCYTE [DISTWIDTH] IN BLOOD BY AUTOMATED COUNT: 19 % (ref 11.8–14.4)
ETHANOL PERCENT: <0.01 %
ETHANOLAMINE SERPL-MCNC: <10 MG/DL
FENTANYL UR QL: NEGATIVE
FIO2 ON VENT: ABNORMAL %
FIO2: 100
FIO2: 65
FIO2: 70
FIO2: 75
FIO2: 80
FIO2: 95
GFR SERPL CREATININE-BSD FRML MDRD: >60 ML/MIN/1.73M2
GLUCOSE BLD-MCNC: 151 MG/DL (ref 74–100)
GLUCOSE BLD-MCNC: 153 MG/DL (ref 74–100)
GLUCOSE BLD-MCNC: 153 MG/DL (ref 75–110)
GLUCOSE BLD-MCNC: 157 MG/DL (ref 75–110)
GLUCOSE BLD-MCNC: 165 MG/DL (ref 74–100)
GLUCOSE BLD-MCNC: 165 MG/DL (ref 75–110)
GLUCOSE BLD-MCNC: 176 MG/DL (ref 75–110)
GLUCOSE BLD-MCNC: 182 MG/DL (ref 75–110)
GLUCOSE BLD-MCNC: 183 MG/DL (ref 75–110)
GLUCOSE BLD-MCNC: 186 MG/DL (ref 75–110)
GLUCOSE BLD-MCNC: 188 MG/DL (ref 74–100)
GLUCOSE BLD-MCNC: 189 MG/DL (ref 75–110)
GLUCOSE BLD-MCNC: 190 MG/DL (ref 75–110)
GLUCOSE BLD-MCNC: 190 MG/DL (ref 75–110)
GLUCOSE BLD-MCNC: 192 MG/DL (ref 74–100)
GLUCOSE BLD-MCNC: 193 MG/DL (ref 75–110)
GLUCOSE BLD-MCNC: 196 MG/DL (ref 74–100)
GLUCOSE BLD-MCNC: 197 MG/DL (ref 75–110)
GLUCOSE BLD-MCNC: 198 MG/DL (ref 75–110)
GLUCOSE BLD-MCNC: 199 MG/DL (ref 74–100)
GLUCOSE BLD-MCNC: 199 MG/DL (ref 75–110)
GLUCOSE BLD-MCNC: 200 MG/DL (ref 75–110)
GLUCOSE BLD-MCNC: 201 MG/DL (ref 75–110)
GLUCOSE BLD-MCNC: 202 MG/DL (ref 74–100)
GLUCOSE BLD-MCNC: 202 MG/DL (ref 75–110)
GLUCOSE BLD-MCNC: 204 MG/DL (ref 75–110)
GLUCOSE BLD-MCNC: 205 MG/DL (ref 74–100)
GLUCOSE BLD-MCNC: 205 MG/DL (ref 74–100)
GLUCOSE BLD-MCNC: 206 MG/DL (ref 75–110)
GLUCOSE BLD-MCNC: 208 MG/DL (ref 75–110)
GLUCOSE BLD-MCNC: 208 MG/DL (ref 75–110)
GLUCOSE BLD-MCNC: 212 MG/DL (ref 75–110)
GLUCOSE BLD-MCNC: 213 MG/DL (ref 74–100)
GLUCOSE BLD-MCNC: 213 MG/DL (ref 75–110)
GLUCOSE BLD-MCNC: 214 MG/DL (ref 75–110)
GLUCOSE BLD-MCNC: 215 MG/DL (ref 75–110)
GLUCOSE BLD-MCNC: 216 MG/DL (ref 75–110)
GLUCOSE BLD-MCNC: 217 MG/DL (ref 75–110)
GLUCOSE BLD-MCNC: 219 MG/DL (ref 75–110)
GLUCOSE BLD-MCNC: 219 MG/DL (ref 75–110)
GLUCOSE BLD-MCNC: 220 MG/DL (ref 74–100)
GLUCOSE BLD-MCNC: 224 MG/DL (ref 75–110)
GLUCOSE BLD-MCNC: 234 MG/DL (ref 75–110)
GLUCOSE BLD-MCNC: 236 MG/DL (ref 75–110)
GLUCOSE BLD-MCNC: 240 MG/DL (ref 75–110)
GLUCOSE BLD-MCNC: 243 MG/DL (ref 75–110)
GLUCOSE BLD-MCNC: 244 MG/DL (ref 75–110)
GLUCOSE BLD-MCNC: 245 MG/DL (ref 75–110)
GLUCOSE BLD-MCNC: 245 MG/DL (ref 75–110)
GLUCOSE BLD-MCNC: 246 MG/DL (ref 75–110)
GLUCOSE BLD-MCNC: 251 MG/DL (ref 75–110)
GLUCOSE BLD-MCNC: 253 MG/DL (ref 75–110)
GLUCOSE BLD-MCNC: 254 MG/DL (ref 75–110)
GLUCOSE BLD-MCNC: 255 MG/DL (ref 75–110)
GLUCOSE BLD-MCNC: 257 MG/DL (ref 74–100)
GLUCOSE BLD-MCNC: 276 MG/DL (ref 74–100)
GLUCOSE BLD-MCNC: 278 MG/DL (ref 75–110)
GLUCOSE BLD-MCNC: 279 MG/DL (ref 75–110)
GLUCOSE BLD-MCNC: 282 MG/DL (ref 74–100)
GLUCOSE BLD-MCNC: 284 MG/DL (ref 74–100)
GLUCOSE BLD-MCNC: 286 MG/DL (ref 75–110)
GLUCOSE BLD-MCNC: 293 MG/DL (ref 75–110)
GLUCOSE BLD-MCNC: 304 MG/DL (ref 75–110)
GLUCOSE BLD-MCNC: 308 MG/DL (ref 75–110)
GLUCOSE BLD-MCNC: 311 MG/DL (ref 75–110)
GLUCOSE BLD-MCNC: 312 MG/DL (ref 75–110)
GLUCOSE BLD-MCNC: 315 MG/DL (ref 75–110)
GLUCOSE BLD-MCNC: 317 MG/DL (ref 75–110)
GLUCOSE BLD-MCNC: 331 MG/DL (ref 75–110)
GLUCOSE BLD-MCNC: 341 MG/DL (ref 75–110)
GLUCOSE SERPL-MCNC: 157 MG/DL (ref 70–99)
GLUCOSE SERPL-MCNC: 163 MG/DL (ref 70–99)
GLUCOSE SERPL-MCNC: 177 MG/DL (ref 70–99)
GLUCOSE SERPL-MCNC: 189 MG/DL (ref 70–99)
GLUCOSE SERPL-MCNC: 191 MG/DL (ref 70–99)
GLUCOSE SERPL-MCNC: 193 MG/DL (ref 70–99)
GLUCOSE SERPL-MCNC: 195 MG/DL (ref 70–99)
GLUCOSE SERPL-MCNC: 200 MG/DL (ref 70–99)
GLUCOSE SERPL-MCNC: 202 MG/DL (ref 70–99)
GLUCOSE SERPL-MCNC: 207 MG/DL (ref 70–99)
GLUCOSE SERPL-MCNC: 223 MG/DL (ref 70–99)
GLUCOSE SERPL-MCNC: 265 MG/DL (ref 70–99)
GLUCOSE SERPL-MCNC: 269 MG/DL (ref 70–99)
GLUCOSE SERPL-MCNC: 275 MG/DL (ref 70–99)
GLUCOSE UR STRIP-MCNC: NEGATIVE MG/DL
GLUCOSE UR STRIP-MCNC: NEGATIVE MG/DL
HCO3 VENOUS: 35.7 MMOL/L (ref 24–30)
HCO3 VENOUS: 39.3 MMOL/L (ref 22–29)
HCO3 VENOUS: 47.3 MMOL/L (ref 22–29)
HCT VFR BLD AUTO: 35 % (ref 41–53)
HCT VFR BLD AUTO: 35.4 % (ref 40.7–50.3)
HCT VFR BLD AUTO: 35.7 % (ref 40.7–50.3)
HCT VFR BLD AUTO: 36.8 % (ref 40.7–50.3)
HCT VFR BLD AUTO: 37.2 % (ref 40.7–50.3)
HCT VFR BLD AUTO: 37.5 % (ref 40.7–50.3)
HCT VFR BLD AUTO: 37.9 % (ref 41–53)
HCT VFR BLD AUTO: 38 % (ref 41–53)
HCT VFR BLD AUTO: 38 % (ref 41–53)
HCT VFR BLD AUTO: 38.7 % (ref 40.7–50.3)
HCT VFR BLD AUTO: 39 % (ref 40.7–50.3)
HCT VFR BLD AUTO: 39.4 % (ref 40.7–50.3)
HCT VFR BLD AUTO: 40 % (ref 40.7–50.3)
HCT VFR BLD AUTO: 40.1 % (ref 40.7–50.3)
HCT VFR BLD AUTO: 40.2 % (ref 40.7–50.3)
HCT VFR BLD AUTO: 41.5 % (ref 40.7–50.3)
HGB BLD-MCNC: 10.3 G/DL (ref 13–17)
HGB BLD-MCNC: 10.3 G/DL (ref 13–17)
HGB BLD-MCNC: 10.4 G/DL (ref 13–17)
HGB BLD-MCNC: 10.6 G/DL (ref 13–17)
HGB BLD-MCNC: 10.7 G/DL (ref 13–17)
HGB BLD-MCNC: 10.7 G/DL (ref 13–17)
HGB BLD-MCNC: 10.8 G/DL (ref 13–17)
HGB BLD-MCNC: 10.9 G/DL (ref 13.5–17.5)
HGB BLD-MCNC: 10.9 G/DL (ref 13–17)
HGB BLD-MCNC: 11.1 G/DL (ref 13–17)
HGB BLD-MCNC: 11.1 G/DL (ref 13–17)
HGB BLD-MCNC: 11.3 G/DL (ref 13–17)
HGB BLD-MCNC: 11.8 G/DL (ref 13–17)
HGB UR QL STRIP.AUTO: ABNORMAL
HGB UR QL STRIP.AUTO: NEGATIVE
IMM GRANULOCYTES # BLD AUTO: 0.05 K/UL (ref 0–0.3)
IMM GRANULOCYTES # BLD AUTO: 0.07 K/UL (ref 0–0.3)
IMM GRANULOCYTES # BLD AUTO: 0.07 K/UL (ref 0–0.3)
IMM GRANULOCYTES # BLD AUTO: 0.08 K/UL (ref 0–0.3)
IMM GRANULOCYTES # BLD AUTO: 0.1 K/UL (ref 0–0.3)
IMM GRANULOCYTES # BLD AUTO: 0.11 K/UL (ref 0–0.3)
IMM GRANULOCYTES # BLD AUTO: 0.12 K/UL (ref 0–0.3)
IMM GRANULOCYTES # BLD AUTO: 0.13 K/UL (ref 0–0.3)
IMM GRANULOCYTES # BLD AUTO: 0.17 K/UL (ref 0–0.3)
IMM GRANULOCYTES # BLD AUTO: 0.18 K/UL (ref 0–0.3)
IMM GRANULOCYTES # BLD AUTO: 0.19 K/UL (ref 0–0.3)
IMM GRANULOCYTES # BLD AUTO: 0.24 K/UL (ref 0–0.3)
IMM GRANULOCYTES # BLD AUTO: <0.03 K/UL (ref 0–0.3)
IMM GRANULOCYTES NFR BLD: 0 %
IMM GRANULOCYTES NFR BLD: 1 %
IMM GRANULOCYTES NFR BLD: 1 % (ref 0–5)
IMM GRANULOCYTES NFR BLD: 2 %
IMM GRANULOCYTES NFR BLD: 3 %
INR PPP: 1.2
KETONES UR STRIP-MCNC: NEGATIVE MG/DL
KETONES UR STRIP-MCNC: NEGATIVE MG/DL
LACTIC ACID, SEPSIS WHOLE BLOOD: 1.3 MMOL/L (ref 0.5–1.9)
LACTIC ACID, SEPSIS WHOLE BLOOD: 1.9 MMOL/L (ref 0.5–1.9)
LACTIC ACID, WHOLE BLOOD: 1.5 MMOL/L (ref 0.7–2.1)
LACTIC ACID, WHOLE BLOOD: 1.8 MMOL/L (ref 0.7–2.1)
LEUKOCYTE ESTERASE UR QL STRIP: NEGATIVE
LEUKOCYTE ESTERASE UR QL STRIP: NEGATIVE
LIPASE SERPL-CCNC: 17 U/L (ref 13–60)
LYMPHOCYTES NFR BLD: 0.62 K/UL (ref 1.1–3.7)
LYMPHOCYTES NFR BLD: 0.75 K/UL (ref 1.1–3.7)
LYMPHOCYTES NFR BLD: 0.76 K/UL (ref 1.1–3.7)
LYMPHOCYTES NFR BLD: 0.8 K/UL (ref 1–4.8)
LYMPHOCYTES NFR BLD: 0.81 K/UL (ref 1.1–3.7)
LYMPHOCYTES NFR BLD: 0.81 K/UL (ref 1.1–3.7)
LYMPHOCYTES NFR BLD: 0.82 K/UL (ref 1.1–3.7)
LYMPHOCYTES NFR BLD: 0.86 K/UL (ref 1.1–3.7)
LYMPHOCYTES NFR BLD: 0.88 K/UL (ref 1.1–3.7)
LYMPHOCYTES NFR BLD: 0.96 K/UL (ref 1.1–3.7)
LYMPHOCYTES NFR BLD: 1.01 K/UL (ref 1.1–3.7)
LYMPHOCYTES NFR BLD: 1.05 K/UL (ref 1–4.8)
LYMPHOCYTES NFR BLD: 1.07 K/UL (ref 1.1–3.7)
LYMPHOCYTES RELATIVE PERCENT: 10 % (ref 24–43)
LYMPHOCYTES RELATIVE PERCENT: 10 % (ref 24–43)
LYMPHOCYTES RELATIVE PERCENT: 11 % (ref 24–43)
LYMPHOCYTES RELATIVE PERCENT: 12 % (ref 24–43)
LYMPHOCYTES RELATIVE PERCENT: 12 % (ref 24–44)
LYMPHOCYTES RELATIVE PERCENT: 13 % (ref 24–43)
LYMPHOCYTES RELATIVE PERCENT: 13 % (ref 24–43)
LYMPHOCYTES RELATIVE PERCENT: 14 % (ref 24–43)
LYMPHOCYTES RELATIVE PERCENT: 14 % (ref 24–43)
LYMPHOCYTES RELATIVE PERCENT: 15 % (ref 13–44)
LYMPHOCYTES RELATIVE PERCENT: 7 % (ref 24–43)
LYMPHOCYTES RELATIVE PERCENT: 9 % (ref 24–43)
LYMPHOCYTES RELATIVE PERCENT: 9 % (ref 24–43)
MAGNESIUM SERPL-MCNC: 2.4 MG/DL (ref 1.6–2.6)
MAGNESIUM SERPL-MCNC: 3.2 MG/DL (ref 1.6–2.6)
MCH RBC QN AUTO: 23.9 PG (ref 25.2–33.5)
MCH RBC QN AUTO: 24.2 PG (ref 25.2–33.5)
MCH RBC QN AUTO: 24.3 PG (ref 25.2–33.5)
MCH RBC QN AUTO: 24.5 PG (ref 25.2–33.5)
MCH RBC QN AUTO: 24.5 PG (ref 25.2–33.5)
MCH RBC QN AUTO: 24.5 PG (ref 26–34)
MCH RBC QN AUTO: 24.6 PG (ref 25.2–33.5)
MCH RBC QN AUTO: 24.7 PG (ref 25.2–33.5)
MCH RBC QN AUTO: 24.7 PG (ref 25.2–33.5)
MCH RBC QN AUTO: 24.8 PG (ref 25.2–33.5)
MCH RBC QN AUTO: 25 PG (ref 25.2–33.5)
MCH RBC QN AUTO: 25.1 PG (ref 25.2–33.5)
MCH RBC QN AUTO: 25.3 PG (ref 25.2–33.5)
MCHC RBC AUTO-ENTMCNC: 26.7 G/DL (ref 28.4–34.8)
MCHC RBC AUTO-ENTMCNC: 27.4 G/DL (ref 28.4–34.8)
MCHC RBC AUTO-ENTMCNC: 27.6 G/DL (ref 28.4–34.8)
MCHC RBC AUTO-ENTMCNC: 27.8 G/DL (ref 28.4–34.8)
MCHC RBC AUTO-ENTMCNC: 27.9 G/DL (ref 28.4–34.8)
MCHC RBC AUTO-ENTMCNC: 28.3 G/DL (ref 28.4–34.8)
MCHC RBC AUTO-ENTMCNC: 28.4 G/DL (ref 28.4–34.8)
MCHC RBC AUTO-ENTMCNC: 28.5 G/DL (ref 28.4–34.8)
MCHC RBC AUTO-ENTMCNC: 28.7 G/DL (ref 28.4–34.8)
MCHC RBC AUTO-ENTMCNC: 28.8 G/DL (ref 31–37)
MCHC RBC AUTO-ENTMCNC: 28.9 G/DL (ref 28.4–34.8)
MCHC RBC AUTO-ENTMCNC: 29.1 G/DL (ref 28.4–34.8)
MCHC RBC AUTO-ENTMCNC: 29.1 G/DL (ref 28.4–34.8)
MCV RBC AUTO: 85.4 FL (ref 80–100)
MCV RBC AUTO: 85.9 FL (ref 82.6–102.9)
MCV RBC AUTO: 86 FL (ref 82.6–102.9)
MCV RBC AUTO: 86.5 FL (ref 82.6–102.9)
MCV RBC AUTO: 86.6 FL (ref 82.6–102.9)
MCV RBC AUTO: 86.7 FL (ref 82.6–102.9)
MCV RBC AUTO: 87.1 FL (ref 82.6–102.9)
MCV RBC AUTO: 87.2 FL (ref 82.6–102.9)
MCV RBC AUTO: 87.6 FL (ref 82.6–102.9)
MCV RBC AUTO: 87.7 FL (ref 82.6–102.9)
MCV RBC AUTO: 88.8 FL (ref 82.6–102.9)
MCV RBC AUTO: 89.2 FL (ref 82.6–102.9)
MCV RBC AUTO: 89.7 FL (ref 82.6–102.9)
METHADONE UR QL: NEGATIVE
MICROORGANISM SPEC CULT: ABNORMAL
MICROORGANISM SPEC CULT: NORMAL
MICROORGANISM/AGENT SPEC: NORMAL
MODE: ABNORMAL
MODE: AC
MONOCYTES NFR BLD: 0.51 K/UL (ref 0.1–1.2)
MONOCYTES NFR BLD: 0.64 K/UL (ref 0.1–1.2)
MONOCYTES NFR BLD: 0.73 K/UL (ref 0.1–1.2)
MONOCYTES NFR BLD: 0.76 K/UL (ref 0.1–1.2)
MONOCYTES NFR BLD: 0.79 K/UL (ref 0.1–1.2)
MONOCYTES NFR BLD: 0.8 K/UL (ref 0.1–1.2)
MONOCYTES NFR BLD: 0.83 K/UL (ref 0.1–1.2)
MONOCYTES NFR BLD: 0.92 K/UL (ref 0.1–1.2)
MONOCYTES NFR BLD: 0.94 K/UL (ref 0.1–0.8)
MONOCYTES NFR BLD: 1.17 K/UL (ref 0.1–1.2)
MONOCYTES NFR BLD: 1.21 K/UL (ref 0.1–1.2)
MONOCYTES NFR BLD: 1.24 K/UL (ref 0–1)
MONOCYTES NFR BLD: 1.26 K/UL (ref 0.1–1.2)
MONOCYTES NFR BLD: 10 % (ref 3–12)
MONOCYTES NFR BLD: 11 % (ref 3–12)
MONOCYTES NFR BLD: 11 % (ref 3–12)
MONOCYTES NFR BLD: 12 % (ref 3–12)
MONOCYTES NFR BLD: 13 % (ref 3–12)
MONOCYTES NFR BLD: 14 % (ref 1–7)
MONOCYTES NFR BLD: 17 % (ref 5–9)
MONOCYTES NFR BLD: 9 % (ref 3–12)
MONOCYTES NFR BLD: 9 % (ref 3–12)
MORPHOLOGY: ABNORMAL
MRSA, DNA, NASAL: NEGATIVE
MYOGLOBIN SERPL-MCNC: 260 NG/ML (ref 28–72)
NEUTROPHILS NFR BLD: 66 % (ref 39–75)
NEUTROPHILS NFR BLD: 72 % (ref 36–65)
NEUTROPHILS NFR BLD: 72 % (ref 36–65)
NEUTROPHILS NFR BLD: 73 % (ref 36–65)
NEUTROPHILS NFR BLD: 73 % (ref 36–65)
NEUTROPHILS NFR BLD: 73 % (ref 36–66)
NEUTROPHILS NFR BLD: 74 % (ref 36–65)
NEUTROPHILS NFR BLD: 75 % (ref 36–65)
NEUTROPHILS NFR BLD: 76 % (ref 36–65)
NEUTS SEG NFR BLD: 3.96 K/UL (ref 1.5–8.1)
NEUTS SEG NFR BLD: 4.23 K/UL (ref 1.5–8.1)
NEUTS SEG NFR BLD: 4.51 K/UL (ref 1.5–8.1)
NEUTS SEG NFR BLD: 4.59 K/UL (ref 1.5–8.1)
NEUTS SEG NFR BLD: 4.79 K/UL (ref 2.1–6.5)
NEUTS SEG NFR BLD: 4.89 K/UL (ref 1.8–7.7)
NEUTS SEG NFR BLD: 5.55 K/UL (ref 1.5–8.1)
NEUTS SEG NFR BLD: 5.83 K/UL (ref 1.5–8.1)
NEUTS SEG NFR BLD: 6.16 K/UL (ref 1.5–8.1)
NEUTS SEG NFR BLD: 6.68 K/UL (ref 1.5–8.1)
NEUTS SEG NFR BLD: 6.99 K/UL (ref 1.5–8.1)
NEUTS SEG NFR BLD: 7.58 K/UL (ref 1.5–8.1)
NEUTS SEG NFR BLD: 7.65 K/UL (ref 1.5–8.1)
NITRITE UR QL STRIP: NEGATIVE
NITRITE UR QL STRIP: NEGATIVE
NRBC BLD-RTO: 0 PER 100 WBC
NRBC BLD-RTO: 0.2 PER 100 WBC
NRBC BLD-RTO: 0.3 PER 100 WBC
NRBC BLD-RTO: 0.8 PER 100 WBC
O2 DELIVERY DEVICE: ABNORMAL
O2 SAT, VEN: 40.4 % (ref 60–85)
O2 SAT, VEN: 69.7 % (ref 60–85)
O2 SAT, VEN: 99.2 % (ref 60–85)
OPIATES UR QL SCN: POSITIVE
OXYCODONE UR QL SCN: NEGATIVE
PATIENT TEMP: 37.4
PATIENT TEMP: 37.5
PATIENT TEMP: 37.9
PATIENT TEMP: 38
PATIENT TEMP: 38.2
PATIENT TEMP: 38.3
PATIENT TEMP: 38.3
PCO2, VEN: 56 MM HG (ref 39–55)
PCO2, VEN: 81.9 MM HG (ref 41–51)
PCO2, VEN: 92.8 MM HG (ref 41–51)
PCP UR QL SCN: NEGATIVE
PH UR STRIP: 5 [PH] (ref 5–8)
PH UR STRIP: 5.5 [PH] (ref 5–8)
PH VENOUS: 7.24 (ref 7.32–7.43)
PH VENOUS: 7.37 (ref 7.32–7.43)
PH VENOUS: 7.42 (ref 7.32–7.42)
PLATELET # BLD AUTO: 127 K/UL (ref 138–453)
PLATELET # BLD AUTO: 168 K/UL (ref 138–453)
PLATELET # BLD AUTO: 170 K/UL (ref 140–450)
PLATELET # BLD AUTO: 177 K/UL (ref 138–453)
PLATELET # BLD AUTO: 181 K/UL (ref 138–453)
PLATELET # BLD AUTO: 189 K/UL (ref 138–453)
PLATELET # BLD AUTO: 195 K/UL (ref 138–453)
PLATELET # BLD AUTO: 201 K/UL (ref 138–453)
PLATELET # BLD AUTO: 203 K/UL (ref 138–453)
PLATELET # BLD AUTO: 238 K/UL (ref 138–453)
PLATELET # BLD AUTO: ABNORMAL K/UL (ref 138–453)
PLATELET, FLUORESCENCE: 163 K/UL (ref 138–453)
PLATELET, FLUORESCENCE: 218 K/UL (ref 138–453)
PLATELET, FLUORESCENCE: ABNORMAL K/UL (ref 138–453)
PLATELETS.RETICULATED NFR BLD AUTO: 5 % (ref 1.1–10.3)
PLATELETS.RETICULATED NFR BLD AUTO: 7 % (ref 1.1–10.3)
PMV BLD AUTO: 10 FL (ref 8.1–13.5)
PMV BLD AUTO: 10.2 FL (ref 8.1–13.5)
PMV BLD AUTO: 10.3 FL (ref 8.1–13.5)
PMV BLD AUTO: 10.3 FL (ref 8.1–13.5)
PMV BLD AUTO: 10.5 FL (ref 8.1–13.5)
PMV BLD AUTO: 10.8 FL (ref 8.1–13.5)
PMV BLD AUTO: 10.8 FL (ref 8.1–13.5)
PMV BLD AUTO: 9.4 FL (ref 6–12)
PMV BLD AUTO: 9.6 FL (ref 8.1–13.5)
PMV BLD AUTO: 9.7 FL (ref 8.1–13.5)
PO2, VEN: 137 MM HG (ref 30–50)
PO2, VEN: 25.6 MM HG (ref 30–50)
PO2, VEN: 45.6 MM HG (ref 30–50)
POC ANION GAP: 7 MMOL/L (ref 7–16)
POC ANION GAP: 7 MMOL/L (ref 7–16)
POC ANION GAP: 9 MMOL/L (ref 7–16)
POC CREATININE: 1.1 MG/DL (ref 0.51–1.19)
POC CREATININE: 1.2 MG/DL (ref 0.51–1.19)
POC CREATININE: 1.2 MG/DL (ref 0.51–1.19)
POC HCO3: 34.7 MMOL/L (ref 21–28)
POC HCO3: 35.5 MMOL/L (ref 21–28)
POC HCO3: 36.6 MMOL/L (ref 21–28)
POC HCO3: 37.1 MMOL/L (ref 21–28)
POC HCO3: 37.8 MMOL/L (ref 21–28)
POC HCO3: 38.6 MMOL/L (ref 21–28)
POC HCO3: 39.2 MMOL/L (ref 21–28)
POC HCO3: 40.1 MMOL/L (ref 21–28)
POC HCO3: 40.2 MMOL/L (ref 21–28)
POC HCO3: 42.1 MMOL/L (ref 21–28)
POC HCO3: 43.7 MMOL/L (ref 21–28)
POC HCO3: 44.4 MMOL/L (ref 21–28)
POC HCO3: 44.6 MMOL/L (ref 21–28)
POC HCO3: 45.2 MMOL/L (ref 21–28)
POC HCO3: 46.7 MMOL/L (ref 21–28)
POC HCO3: 47.2 MMOL/L (ref 21–28)
POC HEMOGLOBIN (CALC): 12.1 G/DL (ref 13.5–17.5)
POC HEMOGLOBIN (CALC): 12.8 G/DL (ref 13.5–17.5)
POC HEMOGLOBIN (CALC): 13.1 G/DL (ref 13.5–17.5)
POC LACTIC ACID: 0.8 MMOL/L (ref 0.56–1.39)
POC LACTIC ACID: 1 MMOL/L (ref 0.56–1.39)
POC LACTIC ACID: 1.6 MMOL/L (ref 0.56–1.39)
POC O2 SATURATION: 77.7 % (ref 94–98)
POC O2 SATURATION: 80.5 % (ref 94–98)
POC O2 SATURATION: 85.2 % (ref 94–98)
POC O2 SATURATION: 85.7 % (ref 94–98)
POC O2 SATURATION: 86.3 % (ref 94–98)
POC O2 SATURATION: 90.5 % (ref 94–98)
POC O2 SATURATION: 91.2 % (ref 94–98)
POC O2 SATURATION: 91.3 % (ref 94–98)
POC O2 SATURATION: 91.7 % (ref 94–98)
POC O2 SATURATION: 91.9 % (ref 94–98)
POC O2 SATURATION: 92.4 % (ref 94–98)
POC O2 SATURATION: 94 % (ref 94–98)
POC O2 SATURATION: 98.1 % (ref 94–98)
POC O2 SATURATION: 99.5 % (ref 94–98)
POC O2 SATURATION: 99.7 % (ref 94–98)
POC O2 SATURATION: 99.9 % (ref 94–98)
POC PCO2 TEMP: 52.5 MM HG
POC PCO2 TEMP: 57.5 MM HG
POC PCO2 TEMP: 60 MM HG
POC PCO2 TEMP: 64.8 MM HG
POC PCO2 TEMP: 74.5 MM HG
POC PCO2 TEMP: 78.9 MM HG
POC PCO2 TEMP: 86.3 MM HG
POC PCO2: 50.4 MM HG (ref 35–48)
POC PCO2: 56.5 MM HG (ref 35–48)
POC PCO2: 56.7 MM HG (ref 35–48)
POC PCO2: 56.7 MM HG (ref 35–48)
POC PCO2: 57.5 MM HG (ref 35–48)
POC PCO2: 58 MM HG (ref 35–48)
POC PCO2: 62.7 MM HG (ref 35–48)
POC PCO2: 63.4 MM HG (ref 35–48)
POC PCO2: 63.6 MM HG (ref 35–48)
POC PCO2: 70.7 MM HG (ref 35–48)
POC PCO2: 71.4 MM HG (ref 35–48)
POC PCO2: 74.5 MM HG (ref 35–48)
POC PCO2: 74.5 MM HG (ref 35–48)
POC PCO2: 74.9 MM HG (ref 35–48)
POC PCO2: 75.9 MM HG (ref 35–48)
POC PCO2: 96.9 MM HG (ref 35–48)
POC PH TEMP: 7.35
POC PH TEMP: 7.36
POC PH TEMP: 7.4
POC PH TEMP: 7.41
POC PH TEMP: 7.42
POC PH TEMP: 7.43
POC PH TEMP: 7.48
POC PH: 7.23 (ref 7.35–7.45)
POC PH: 7.35 (ref 7.35–7.45)
POC PH: 7.35 (ref 7.35–7.45)
POC PH: 7.38 (ref 7.35–7.45)
POC PH: 7.39 (ref 7.35–7.45)
POC PH: 7.39 (ref 7.35–7.45)
POC PH: 7.41 (ref 7.35–7.45)
POC PH: 7.42 (ref 7.35–7.45)
POC PH: 7.42 (ref 7.35–7.45)
POC PH: 7.43 (ref 7.35–7.45)
POC PH: 7.43 (ref 7.35–7.45)
POC PH: 7.44 (ref 7.35–7.45)
POC PH: 7.45 (ref 7.35–7.45)
POC PH: 7.5 (ref 7.35–7.45)
POC PO2 TEMP: 108.2 MM HG
POC PO2 TEMP: 27.9 MM HG
POC PO2 TEMP: 50.2 MM HG
POC PO2 TEMP: 56.7 MM HG
POC PO2 TEMP: 64 MM HG
POC PO2 TEMP: 64.2 MM HG
POC PO2 TEMP: 73.5 MM HG
POC PO2: 100.1 MM HG (ref 83–108)
POC PO2: 212.8 MM HG (ref 83–108)
POC PO2: 218.8 MM HG (ref 83–108)
POC PO2: 271.9 MM HG (ref 83–108)
POC PO2: 45.5 MM HG (ref 83–108)
POC PO2: 45.8 MM HG (ref 83–108)
POC PO2: 52.8 MM HG (ref 83–108)
POC PO2: 54 MM HG (ref 83–108)
POC PO2: 54.6 MM HG (ref 83–108)
POC PO2: 60.1 MM HG (ref 83–108)
POC PO2: 62 MM HG (ref 83–108)
POC PO2: 62.4 MM HG (ref 83–108)
POC PO2: 64.7 MM HG (ref 83–108)
POC PO2: 66.6 MM HG (ref 83–108)
POC PO2: 70.5 MM HG (ref 83–108)
POC PO2: 71.5 MM HG (ref 83–108)
POSITIVE BASE EXCESS, ART: 10.1 MMOL/L (ref 0–3)
POSITIVE BASE EXCESS, ART: 10.6 MMOL/L (ref 0–3)
POSITIVE BASE EXCESS, ART: 10.7 MMOL/L (ref 0–3)
POSITIVE BASE EXCESS, ART: 12.1 MMOL/L (ref 0–3)
POSITIVE BASE EXCESS, ART: 12.6 MMOL/L (ref 0–3)
POSITIVE BASE EXCESS, ART: 12.8 MMOL/L (ref 0–3)
POSITIVE BASE EXCESS, ART: 13 MMOL/L (ref 0–3)
POSITIVE BASE EXCESS, ART: 14.7 MMOL/L (ref 0–3)
POSITIVE BASE EXCESS, ART: 16.1 MMOL/L (ref 0–3)
POSITIVE BASE EXCESS, ART: 16.5 MMOL/L (ref 0–3)
POSITIVE BASE EXCESS, ART: 18.1 MMOL/L (ref 0–3)
POSITIVE BASE EXCESS, ART: 18.4 MMOL/L (ref 0–3)
POSITIVE BASE EXCESS, ART: 18.8 MMOL/L (ref 0–3)
POSITIVE BASE EXCESS, ART: 7.9 MMOL/L (ref 0–3)
POSITIVE BASE EXCESS, ART: 8.4 MMOL/L (ref 0–3)
POSITIVE BASE EXCESS, ART: 9.3 MMOL/L (ref 0–3)
POSITIVE BASE EXCESS, VEN: 10 MMOL/L (ref 0–2)
POSITIVE BASE EXCESS, VEN: 17.2 MMOL/L (ref 0–3)
POSITIVE BASE EXCESS, VEN: 8.3 MMOL/L (ref 0–3)
POTASSIUM BLD-SCNC: 3.6 MMOL/L (ref 3.5–4.5)
POTASSIUM BLD-SCNC: 4.4 MMOL/L (ref 3.5–4.5)
POTASSIUM BLD-SCNC: 4.5 MMOL/L (ref 3.5–4.5)
POTASSIUM SERPL-SCNC: 3.3 MMOL/L (ref 3.7–5.3)
POTASSIUM SERPL-SCNC: 3.5 MMOL/L (ref 3.7–5.3)
POTASSIUM SERPL-SCNC: 3.6 MMOL/L (ref 3.7–5.3)
POTASSIUM SERPL-SCNC: 3.7 MMOL/L (ref 3.7–5.3)
POTASSIUM SERPL-SCNC: 3.9 MMOL/L (ref 3.7–5.3)
POTASSIUM SERPL-SCNC: 4.3 MMOL/L (ref 3.7–5.3)
POTASSIUM SERPL-SCNC: 4.4 MMOL/L (ref 3.7–5.3)
POTASSIUM SERPL-SCNC: 4.4 MMOL/L (ref 3.7–5.3)
POTASSIUM SERPL-SCNC: 4.5 MMOL/L (ref 3.7–5.3)
POTASSIUM SERPL-SCNC: 4.5 MMOL/L (ref 3.7–5.3)
POTASSIUM SERPL-SCNC: 4.6 MMOL/L (ref 3.7–5.3)
POTASSIUM SERPL-SCNC: 4.8 MMOL/L (ref 3.7–5.3)
PROCALCITONIN SERPL-MCNC: 0.19 NG/ML
PROT SERPL-MCNC: 6.5 G/DL (ref 6.4–8.3)
PROT SERPL-MCNC: 6.8 G/DL (ref 6.4–8.3)
PROT SERPL-MCNC: 6.9 G/DL (ref 6.4–8.3)
PROT SERPL-MCNC: 7.2 G/DL (ref 6.4–8.3)
PROT SERPL-MCNC: 7.9 G/DL (ref 6.4–8.3)
PROT UR STRIP-MCNC: ABNORMAL MG/DL
PROT UR STRIP-MCNC: ABNORMAL MG/DL
PROTHROMBIN TIME: 14.7 SEC (ref 11.7–14.9)
RBC # BLD AUTO: 4.1 M/UL (ref 4.21–5.77)
RBC # BLD AUTO: 4.12 M/UL (ref 4.21–5.77)
RBC # BLD AUTO: 4.25 M/UL (ref 4.21–5.77)
RBC # BLD AUTO: 4.29 M/UL (ref 4.21–5.77)
RBC # BLD AUTO: 4.3 M/UL (ref 4.21–5.77)
RBC # BLD AUTO: 4.36 M/UL (ref 4.21–5.77)
RBC # BLD AUTO: 4.37 M/UL (ref 4.21–5.77)
RBC # BLD AUTO: 4.44 M/UL (ref 4.5–5.9)
RBC # BLD AUTO: 4.47 M/UL (ref 4.21–5.77)
RBC # BLD AUTO: 4.56 M/UL (ref 4.21–5.77)
RBC # BLD AUTO: 4.58 M/UL (ref 4.21–5.77)
RBC # BLD AUTO: 4.59 M/UL (ref 4.21–5.77)
RBC # BLD AUTO: 4.8 M/UL (ref 4.21–5.77)
RBC # BLD: ABNORMAL 10*6/UL
RBC #/AREA URNS HPF: ABNORMAL /HPF (ref 0–4)
RBC #/AREA URNS HPF: NORMAL /HPF (ref 0–2)
SALICYLATES SERPL-MCNC: <1 MG/DL (ref 3–10)
SAMPLE SITE: ABNORMAL
SARS-COV-2 RDRP RESP QL NAA+PROBE: DETECTED
SERVICE CMNT-IMP: ABNORMAL
SERVICE CMNT-IMP: NORMAL
SODIUM BLD-SCNC: 141 MMOL/L (ref 138–146)
SODIUM BLD-SCNC: 142 MMOL/L (ref 138–146)
SODIUM BLD-SCNC: 150 MMOL/L (ref 138–146)
SODIUM SERPL-SCNC: 138 MMOL/L (ref 135–144)
SODIUM SERPL-SCNC: 139 MMOL/L (ref 135–144)
SODIUM SERPL-SCNC: 140 MMOL/L (ref 135–144)
SODIUM SERPL-SCNC: 141 MMOL/L (ref 135–144)
SODIUM SERPL-SCNC: 142 MMOL/L (ref 135–144)
SODIUM SERPL-SCNC: 143 MMOL/L (ref 135–144)
SODIUM SERPL-SCNC: 144 MMOL/L (ref 135–144)
SODIUM SERPL-SCNC: 145 MMOL/L (ref 135–144)
SODIUM SERPL-SCNC: 146 MMOL/L (ref 135–144)
SODIUM SERPL-SCNC: 148 MMOL/L (ref 135–144)
SODIUM SERPL-SCNC: 150 MMOL/L (ref 135–144)
SP GR UR STRIP: 1.02 (ref 1–1.03)
SP GR UR STRIP: 1.02 (ref 1–1.03)
SPECIMEN DESCRIPTION: ABNORMAL
SPECIMEN DESCRIPTION: ABNORMAL
SPECIMEN DESCRIPTION: NORMAL
TEST INFORMATION: ABNORMAL
TRIGL SERPL-MCNC: 185 MG/DL
TRIGL SERPL-MCNC: 308 MG/DL
TROPONIN I SERPL HS-MCNC: 17 NG/L (ref 0–22)
TROPONIN I SERPL HS-MCNC: 20 NG/L (ref 0–22)
TROPONIN I SERPL HS-MCNC: 23 NG/L (ref 0–22)
TROPONIN I SERPL HS-MCNC: 25 NG/L (ref 0–22)
TROPONIN I SERPL HS-MCNC: 26 NG/L (ref 0–22)
TROPONIN I SERPL HS-MCNC: 26 NG/L (ref 0–22)
TROPONIN I SERPL HS-MCNC: 28 NG/L (ref 0–22)
TROPONIN I SERPL HS-MCNC: 31 NG/L (ref 0–22)
TROPONIN I SERPL HS-MCNC: 33 NG/L (ref 0–22)
TSH SERPL DL<=0.05 MIU/L-ACNC: 0.37 UIU/ML (ref 0.3–5)
UROBILINOGEN UR STRIP-ACNC: NORMAL EU/DL (ref 0–1)
UROBILINOGEN UR STRIP-ACNC: NORMAL EU/DL (ref 0–1)
VANCOMYCIN TROUGH SERPL-MCNC: 16.2 UG/ML (ref 10–20)
WBC #/AREA URNS HPF: ABNORMAL /HPF (ref 0–5)
WBC #/AREA URNS HPF: NORMAL /HPF
WBC OTHER # BLD: 10.1 K/UL (ref 3.5–11.3)
WBC OTHER # BLD: 10.2 K/UL (ref 3.5–11.3)
WBC OTHER # BLD: 5.3 K/UL (ref 3.5–11.3)
WBC OTHER # BLD: 5.8 K/UL (ref 3.5–11.3)
WBC OTHER # BLD: 6.1 K/UL (ref 3.5–11.3)
WBC OTHER # BLD: 6.4 K/UL (ref 3.5–11.3)
WBC OTHER # BLD: 6.7 K/UL (ref 3.5–11.3)
WBC OTHER # BLD: 7.3 K/UL (ref 3.5–11)
WBC OTHER # BLD: 7.3 K/UL (ref 3.5–11.3)
WBC OTHER # BLD: 8 K/UL (ref 3.5–11.3)
WBC OTHER # BLD: 8.1 K/UL (ref 3.5–11.3)
WBC OTHER # BLD: 8.8 K/UL (ref 3.5–11.3)
WBC OTHER # BLD: 9.7 K/UL (ref 3.5–11.3)

## 2023-01-01 PROCEDURE — 6360000002 HC RX W HCPCS

## 2023-01-01 PROCEDURE — 2580000003 HC RX 258

## 2023-01-01 PROCEDURE — 36600 WITHDRAWAL OF ARTERIAL BLOOD: CPT

## 2023-01-01 PROCEDURE — 6370000000 HC RX 637 (ALT 250 FOR IP)

## 2023-01-01 PROCEDURE — C9113 INJ PANTOPRAZOLE SODIUM, VIA: HCPCS

## 2023-01-01 PROCEDURE — 99232 SBSQ HOSP IP/OBS MODERATE 35: CPT | Performed by: STUDENT IN AN ORGANIZED HEALTH CARE EDUCATION/TRAINING PROGRAM

## 2023-01-01 PROCEDURE — 85025 COMPLETE CBC W/AUTO DIFF WBC: CPT

## 2023-01-01 PROCEDURE — 2700000000 HC OXYGEN THERAPY PER DAY

## 2023-01-01 PROCEDURE — 80053 COMPREHEN METABOLIC PANEL: CPT

## 2023-01-01 PROCEDURE — 94761 N-INVAS EAR/PLS OXIMETRY MLT: CPT

## 2023-01-01 PROCEDURE — 2000000000 HC ICU R&B

## 2023-01-01 PROCEDURE — 80048 BASIC METABOLIC PNL TOTAL CA: CPT

## 2023-01-01 PROCEDURE — A4216 STERILE WATER/SALINE, 10 ML: HCPCS

## 2023-01-01 PROCEDURE — 80051 ELECTROLYTE PANEL: CPT

## 2023-01-01 PROCEDURE — 82803 BLOOD GASES ANY COMBINATION: CPT

## 2023-01-01 PROCEDURE — 94003 VENT MGMT INPAT SUBQ DAY: CPT

## 2023-01-01 PROCEDURE — 2500000003 HC RX 250 WO HCPCS: Performed by: STUDENT IN AN ORGANIZED HEALTH CARE EDUCATION/TRAINING PROGRAM

## 2023-01-01 PROCEDURE — 99291 CRITICAL CARE FIRST HOUR: CPT | Performed by: INTERNAL MEDICINE

## 2023-01-01 PROCEDURE — 80143 DRUG ASSAY ACETAMINOPHEN: CPT

## 2023-01-01 PROCEDURE — 31500 INSERT EMERGENCY AIRWAY: CPT

## 2023-01-01 PROCEDURE — 81001 URINALYSIS AUTO W/SCOPE: CPT

## 2023-01-01 PROCEDURE — 99233 SBSQ HOSP IP/OBS HIGH 50: CPT | Performed by: INTERNAL MEDICINE

## 2023-01-01 PROCEDURE — 70450 CT HEAD/BRAIN W/O DYE: CPT

## 2023-01-01 PROCEDURE — 6370000000 HC RX 637 (ALT 250 FOR IP): Performed by: STUDENT IN AN ORGANIZED HEALTH CARE EDUCATION/TRAINING PROGRAM

## 2023-01-01 PROCEDURE — 2500000003 HC RX 250 WO HCPCS

## 2023-01-01 PROCEDURE — 6360000002 HC RX W HCPCS: Performed by: STUDENT IN AN ORGANIZED HEALTH CARE EDUCATION/TRAINING PROGRAM

## 2023-01-01 PROCEDURE — 36415 COLL VENOUS BLD VENIPUNCTURE: CPT

## 2023-01-01 PROCEDURE — 2580000003 HC RX 258: Performed by: NURSE PRACTITIONER

## 2023-01-01 PROCEDURE — 85014 HEMATOCRIT: CPT

## 2023-01-01 PROCEDURE — 6370000000 HC RX 637 (ALT 250 FOR IP): Performed by: EMERGENCY MEDICINE

## 2023-01-01 PROCEDURE — 87070 CULTURE OTHR SPECIMN AEROBIC: CPT

## 2023-01-01 PROCEDURE — 82947 ASSAY GLUCOSE BLOOD QUANT: CPT

## 2023-01-01 PROCEDURE — 71045 X-RAY EXAM CHEST 1 VIEW: CPT

## 2023-01-01 PROCEDURE — 83735 ASSAY OF MAGNESIUM: CPT

## 2023-01-01 PROCEDURE — 84145 PROCALCITONIN (PCT): CPT

## 2023-01-01 PROCEDURE — 94640 AIRWAY INHALATION TREATMENT: CPT

## 2023-01-01 PROCEDURE — 84520 ASSAY OF UREA NITROGEN: CPT

## 2023-01-01 PROCEDURE — 80076 HEPATIC FUNCTION PANEL: CPT

## 2023-01-01 PROCEDURE — 83880 ASSAY OF NATRIURETIC PEPTIDE: CPT

## 2023-01-01 PROCEDURE — 2580000003 HC RX 258: Performed by: EMERGENCY MEDICINE

## 2023-01-01 PROCEDURE — 93010 ELECTROCARDIOGRAM REPORT: CPT | Performed by: INTERNAL MEDICINE

## 2023-01-01 PROCEDURE — 84484 ASSAY OF TROPONIN QUANT: CPT

## 2023-01-01 PROCEDURE — 6360000002 HC RX W HCPCS: Performed by: INTERNAL MEDICINE

## 2023-01-01 PROCEDURE — 82550 ASSAY OF CK (CPK): CPT

## 2023-01-01 PROCEDURE — 84478 ASSAY OF TRIGLYCERIDES: CPT

## 2023-01-01 PROCEDURE — 6370000000 HC RX 637 (ALT 250 FOR IP): Performed by: INTERNAL MEDICINE

## 2023-01-01 PROCEDURE — 96374 THER/PROPH/DIAG INJ IV PUSH: CPT

## 2023-01-01 PROCEDURE — 83874 ASSAY OF MYOGLOBIN: CPT

## 2023-01-01 PROCEDURE — 89220 SPUTUM SPECIMEN COLLECTION: CPT

## 2023-01-01 PROCEDURE — 83605 ASSAY OF LACTIC ACID: CPT

## 2023-01-01 PROCEDURE — 87154 CUL TYP ID BLD PTHGN 6+ TRGT: CPT

## 2023-01-01 PROCEDURE — 86140 C-REACTIVE PROTEIN: CPT

## 2023-01-01 PROCEDURE — 95720 EEG PHY/QHP EA INCR W/VEEG: CPT | Performed by: PSYCHIATRY & NEUROLOGY

## 2023-01-01 PROCEDURE — 85055 RETICULATED PLATELET ASSAY: CPT

## 2023-01-01 PROCEDURE — 5A1955Z RESPIRATORY VENTILATION, GREATER THAN 96 CONSECUTIVE HOURS: ICD-10-PCS | Performed by: EMERGENCY MEDICINE

## 2023-01-01 PROCEDURE — 82565 ASSAY OF CREATININE: CPT

## 2023-01-01 PROCEDURE — 95714 VEEG EA 12-26 HR UNMNTR: CPT

## 2023-01-01 PROCEDURE — 87040 BLOOD CULTURE FOR BACTERIA: CPT

## 2023-01-01 PROCEDURE — 87086 URINE CULTURE/COLONY COUNT: CPT

## 2023-01-01 PROCEDURE — 71260 CT THORAX DX C+: CPT

## 2023-01-01 PROCEDURE — 6360000002 HC RX W HCPCS: Performed by: NURSE PRACTITIONER

## 2023-01-01 PROCEDURE — 0BH17EZ INSERTION OF ENDOTRACHEAL AIRWAY INTO TRACHEA, VIA NATURAL OR ARTIFICIAL OPENING: ICD-10-PCS | Performed by: EMERGENCY MEDICINE

## 2023-01-01 PROCEDURE — APPSS30 APP SPLIT SHARED TIME 16-30 MINUTES: Performed by: NURSE PRACTITIONER

## 2023-01-01 PROCEDURE — 82330 ASSAY OF CALCIUM: CPT

## 2023-01-01 PROCEDURE — 82805 BLOOD GASES W/O2 SATURATION: CPT

## 2023-01-01 PROCEDURE — 80179 DRUG ASSAY SALICYLATE: CPT

## 2023-01-01 PROCEDURE — 83690 ASSAY OF LIPASE: CPT

## 2023-01-01 PROCEDURE — 99285 EMERGENCY DEPT VISIT HI MDM: CPT

## 2023-01-01 PROCEDURE — 82140 ASSAY OF AMMONIA: CPT

## 2023-01-01 PROCEDURE — 96376 TX/PRO/DX INJ SAME DRUG ADON: CPT

## 2023-01-01 PROCEDURE — 96366 THER/PROPH/DIAG IV INF ADDON: CPT

## 2023-01-01 PROCEDURE — 93306 TTE W/DOPPLER COMPLETE: CPT | Performed by: INTERNAL MEDICINE

## 2023-01-01 PROCEDURE — 93005 ELECTROCARDIOGRAM TRACING: CPT | Performed by: STUDENT IN AN ORGANIZED HEALTH CARE EDUCATION/TRAINING PROGRAM

## 2023-01-01 PROCEDURE — 80307 DRUG TEST PRSMV CHEM ANLYZR: CPT

## 2023-01-01 PROCEDURE — 95700 EEG CONT REC W/VID EEG TECH: CPT

## 2023-01-01 PROCEDURE — 99232 SBSQ HOSP IP/OBS MODERATE 35: CPT | Performed by: INTERNAL MEDICINE

## 2023-01-01 PROCEDURE — 85610 PROTHROMBIN TIME: CPT

## 2023-01-01 PROCEDURE — 99223 1ST HOSP IP/OBS HIGH 75: CPT | Performed by: INTERNAL MEDICINE

## 2023-01-01 PROCEDURE — 4A10X4Z MONITORING OF CENTRAL NERVOUS ELECTRICAL ACTIVITY, EXTERNAL APPROACH: ICD-10-PCS | Performed by: STUDENT IN AN ORGANIZED HEALTH CARE EDUCATION/TRAINING PROGRAM

## 2023-01-01 PROCEDURE — 99222 1ST HOSP IP/OBS MODERATE 55: CPT | Performed by: INTERNAL MEDICINE

## 2023-01-01 PROCEDURE — 96365 THER/PROPH/DIAG IV INF INIT: CPT

## 2023-01-01 PROCEDURE — 74018 RADEX ABDOMEN 1 VIEW: CPT

## 2023-01-01 PROCEDURE — 93005 ELECTROCARDIOGRAM TRACING: CPT | Performed by: EMERGENCY MEDICINE

## 2023-01-01 PROCEDURE — 96375 TX/PRO/DX INJ NEW DRUG ADDON: CPT

## 2023-01-01 PROCEDURE — 80202 ASSAY OF VANCOMYCIN: CPT

## 2023-01-01 PROCEDURE — 99213 OFFICE O/P EST LOW 20 MIN: CPT

## 2023-01-01 PROCEDURE — 2580000003 HC RX 258: Performed by: INTERNAL MEDICINE

## 2023-01-01 PROCEDURE — 93306 TTE W/DOPPLER COMPLETE: CPT

## 2023-01-01 PROCEDURE — 87205 SMEAR GRAM STAIN: CPT

## 2023-01-01 PROCEDURE — 87635 SARS-COV-2 COVID-19 AMP PRB: CPT

## 2023-01-01 PROCEDURE — 99222 1ST HOSP IP/OBS MODERATE 55: CPT | Performed by: STUDENT IN AN ORGANIZED HEALTH CARE EDUCATION/TRAINING PROGRAM

## 2023-01-01 PROCEDURE — 84443 ASSAY THYROID STIM HORMONE: CPT

## 2023-01-01 PROCEDURE — 94002 VENT MGMT INPAT INIT DAY: CPT

## 2023-01-01 PROCEDURE — 6360000002 HC RX W HCPCS: Performed by: EMERGENCY MEDICINE

## 2023-01-01 PROCEDURE — 6360000004 HC RX CONTRAST MEDICATION: Performed by: STUDENT IN AN ORGANIZED HEALTH CARE EDUCATION/TRAINING PROGRAM

## 2023-01-01 PROCEDURE — G0480 DRUG TEST DEF 1-7 CLASSES: HCPCS

## 2023-01-01 PROCEDURE — XW033E5 INTRODUCTION OF REMDESIVIR ANTI-INFECTIVE INTO PERIPHERAL VEIN, PERCUTANEOUS APPROACH, NEW TECHNOLOGY GROUP 5: ICD-10-PCS | Performed by: INTERNAL MEDICINE

## 2023-01-01 PROCEDURE — 87641 MR-STAPH DNA AMP PROBE: CPT

## 2023-01-01 RX ORDER — OXYCODONE HYDROCHLORIDE 5 MG/1
10 TABLET ORAL EVERY 6 HOURS
Status: DISCONTINUED | OUTPATIENT
Start: 2023-01-01 | End: 2023-12-30 | Stop reason: HOSPADM

## 2023-01-01 RX ORDER — ECHINACEA PURPUREA EXTRACT 125 MG
1 TABLET ORAL EVERY 4 HOURS PRN
Status: DISCONTINUED | OUTPATIENT
Start: 2023-01-01 | End: 2023-12-30 | Stop reason: HOSPADM

## 2023-01-01 RX ORDER — HYDRALAZINE HYDROCHLORIDE 20 MG/ML
10 INJECTION INTRAMUSCULAR; INTRAVENOUS ONCE
Status: COMPLETED | OUTPATIENT
Start: 2023-01-01 | End: 2023-01-01

## 2023-01-01 RX ORDER — 0.9 % SODIUM CHLORIDE 0.9 %
1000 INTRAVENOUS SOLUTION INTRAVENOUS ONCE
Status: COMPLETED | OUTPATIENT
Start: 2023-01-01 | End: 2023-01-01

## 2023-01-01 RX ORDER — NALOXONE HYDROCHLORIDE 1 MG/ML
INJECTION INTRAMUSCULAR; INTRAVENOUS; SUBCUTANEOUS DAILY PRN
Status: COMPLETED | OUTPATIENT
Start: 2023-01-01 | End: 2023-01-01

## 2023-01-01 RX ORDER — FUROSEMIDE 10 MG/ML
40 INJECTION INTRAMUSCULAR; INTRAVENOUS
Status: ACTIVE | OUTPATIENT
Start: 2023-01-01 | End: 2023-01-01

## 2023-01-01 RX ORDER — POLYETHYLENE GLYCOL 3350 17 G/17G
17 POWDER, FOR SOLUTION ORAL DAILY PRN
Status: DISCONTINUED | OUTPATIENT
Start: 2023-01-01 | End: 2023-12-30 | Stop reason: HOSPADM

## 2023-01-01 RX ORDER — FUROSEMIDE 10 MG/ML
40 INJECTION INTRAMUSCULAR; INTRAVENOUS DAILY
Status: DISCONTINUED | OUTPATIENT
Start: 2023-01-01 | End: 2023-01-01

## 2023-01-01 RX ORDER — INSULIN LISPRO 100 [IU]/ML
0-4 INJECTION, SOLUTION INTRAVENOUS; SUBCUTANEOUS NIGHTLY
Status: DISCONTINUED | OUTPATIENT
Start: 2023-01-01 | End: 2023-01-01

## 2023-01-01 RX ORDER — CALCIUM GLUCONATE 20 MG/ML
2000 INJECTION, SOLUTION INTRAVENOUS ONCE
Status: COMPLETED | OUTPATIENT
Start: 2023-01-01 | End: 2023-01-01

## 2023-01-01 RX ORDER — INSULIN GLARGINE 100 [IU]/ML
20 INJECTION, SOLUTION SUBCUTANEOUS NIGHTLY
Status: DISCONTINUED | OUTPATIENT
Start: 2023-01-01 | End: 2023-01-01

## 2023-01-01 RX ORDER — DEXAMETHASONE SODIUM PHOSPHATE 4 MG/ML
6 INJECTION, SOLUTION INTRA-ARTICULAR; INTRALESIONAL; INTRAMUSCULAR; INTRAVENOUS; SOFT TISSUE EVERY 24 HOURS
Status: COMPLETED | OUTPATIENT
Start: 2023-01-01 | End: 2023-01-01

## 2023-01-01 RX ORDER — AMOXICILLIN 250 MG
1 CAPSULE ORAL 2 TIMES DAILY
Status: DISCONTINUED | OUTPATIENT
Start: 2023-01-01 | End: 2023-12-30 | Stop reason: HOSPADM

## 2023-01-01 RX ORDER — DEXTROSE MONOHYDRATE 100 MG/ML
INJECTION, SOLUTION INTRAVENOUS CONTINUOUS PRN
Status: DISCONTINUED | OUTPATIENT
Start: 2023-01-01 | End: 2023-12-30 | Stop reason: HOSPADM

## 2023-01-01 RX ORDER — SUCCINYLCHOLINE CHLORIDE 20 MG/ML
INJECTION INTRAMUSCULAR; INTRAVENOUS DAILY PRN
Status: COMPLETED | OUTPATIENT
Start: 2023-01-01 | End: 2023-01-01

## 2023-01-01 RX ORDER — IPRATROPIUM BROMIDE AND ALBUTEROL SULFATE 2.5; .5 MG/3ML; MG/3ML
1 SOLUTION RESPIRATORY (INHALATION)
Status: DISCONTINUED | OUTPATIENT
Start: 2023-01-01 | End: 2023-12-30 | Stop reason: HOSPADM

## 2023-01-01 RX ORDER — MIDAZOLAM HYDROCHLORIDE 1 MG/ML
1-10 INJECTION, SOLUTION INTRAVENOUS CONTINUOUS
Status: DISCONTINUED | OUTPATIENT
Start: 2023-01-01 | End: 2023-01-01

## 2023-01-01 RX ORDER — MAGNESIUM SULFATE IN WATER 40 MG/ML
2000 INJECTION, SOLUTION INTRAVENOUS PRN
Status: DISCONTINUED | OUTPATIENT
Start: 2023-01-01 | End: 2023-12-30 | Stop reason: HOSPADM

## 2023-01-01 RX ORDER — MIDAZOLAM HYDROCHLORIDE 2 MG/2ML
2 INJECTION, SOLUTION INTRAMUSCULAR; INTRAVENOUS ONCE
Status: COMPLETED | OUTPATIENT
Start: 2023-01-01 | End: 2023-01-01

## 2023-01-01 RX ORDER — INSULIN GLARGINE 100 [IU]/ML
25 INJECTION, SOLUTION SUBCUTANEOUS 2 TIMES DAILY
Status: DISCONTINUED | OUTPATIENT
Start: 2023-01-01 | End: 2023-01-01

## 2023-01-01 RX ORDER — FUROSEMIDE 10 MG/ML
40 INJECTION INTRAMUSCULAR; INTRAVENOUS ONCE
Status: DISCONTINUED | OUTPATIENT
Start: 2023-01-01 | End: 2023-01-01

## 2023-01-01 RX ORDER — INSULIN LISPRO 100 [IU]/ML
0-4 INJECTION, SOLUTION INTRAVENOUS; SUBCUTANEOUS EVERY 6 HOURS
Status: DISCONTINUED | OUTPATIENT
Start: 2023-01-01 | End: 2023-01-01

## 2023-01-01 RX ORDER — INSULIN LISPRO 100 [IU]/ML
0-8 INJECTION, SOLUTION INTRAVENOUS; SUBCUTANEOUS EVERY 4 HOURS
Status: DISCONTINUED | OUTPATIENT
Start: 2023-01-01 | End: 2023-01-01

## 2023-01-01 RX ORDER — NALOXONE HYDROCHLORIDE 1 MG/ML
4 INJECTION INTRAMUSCULAR; INTRAVENOUS; SUBCUTANEOUS ONCE
Status: DISCONTINUED | OUTPATIENT
Start: 2023-01-01 | End: 2023-01-01

## 2023-01-01 RX ORDER — ASPIRIN 81 MG/1
81 TABLET ORAL DAILY
Status: DISCONTINUED | OUTPATIENT
Start: 2023-01-01 | End: 2023-01-01

## 2023-01-01 RX ORDER — FUROSEMIDE 10 MG/ML
40 INJECTION INTRAMUSCULAR; INTRAVENOUS ONCE
Status: COMPLETED | OUTPATIENT
Start: 2023-01-01 | End: 2023-01-01

## 2023-01-01 RX ORDER — MIDAZOLAM HYDROCHLORIDE 2 MG/2ML
4 INJECTION, SOLUTION INTRAMUSCULAR; INTRAVENOUS ONCE
Status: COMPLETED | OUTPATIENT
Start: 2023-01-01 | End: 2023-01-01

## 2023-01-01 RX ORDER — SODIUM CHLORIDE 0.9 % (FLUSH) 0.9 %
5-40 SYRINGE (ML) INJECTION PRN
Status: DISCONTINUED | OUTPATIENT
Start: 2023-01-01 | End: 2023-12-30 | Stop reason: HOSPADM

## 2023-01-01 RX ORDER — SODIUM CHLORIDE 9 MG/ML
INJECTION, SOLUTION INTRAVENOUS PRN
Status: DISCONTINUED | OUTPATIENT
Start: 2023-01-01 | End: 2023-12-30 | Stop reason: HOSPADM

## 2023-01-01 RX ORDER — FUROSEMIDE 10 MG/ML
40 INJECTION INTRAMUSCULAR; INTRAVENOUS 2 TIMES DAILY
Status: DISCONTINUED | OUTPATIENT
Start: 2023-01-01 | End: 2023-01-01

## 2023-01-01 RX ORDER — SODIUM CHLORIDE 0.9 % (FLUSH) 0.9 %
5-40 SYRINGE (ML) INJECTION EVERY 12 HOURS SCHEDULED
Status: DISCONTINUED | OUTPATIENT
Start: 2023-01-01 | End: 2023-12-30 | Stop reason: HOSPADM

## 2023-01-01 RX ORDER — OXYMETAZOLINE HYDROCHLORIDE 0.05 G/100ML
2 SPRAY NASAL ONCE
Status: DISCONTINUED | OUTPATIENT
Start: 2023-01-01 | End: 2023-01-01

## 2023-01-01 RX ORDER — INSULIN LISPRO 100 [IU]/ML
0-16 INJECTION, SOLUTION INTRAVENOUS; SUBCUTANEOUS EVERY 4 HOURS
Status: DISCONTINUED | OUTPATIENT
Start: 2023-01-01 | End: 2023-12-30 | Stop reason: HOSPADM

## 2023-01-01 RX ORDER — MORPHINE SULFATE 2 MG/ML
2 INJECTION, SOLUTION INTRAMUSCULAR; INTRAVENOUS
Status: DISCONTINUED | OUTPATIENT
Start: 2023-01-01 | End: 2023-12-30 | Stop reason: HOSPADM

## 2023-01-01 RX ORDER — ALBUTEROL SULFATE 2.5 MG/3ML
2.5 SOLUTION RESPIRATORY (INHALATION)
Status: DISCONTINUED | OUTPATIENT
Start: 2023-01-01 | End: 2023-12-30 | Stop reason: HOSPADM

## 2023-01-01 RX ORDER — GLYCOPYRROLATE 0.2 MG/ML
0.2 INJECTION INTRAMUSCULAR; INTRAVENOUS EVERY 4 HOURS PRN
Status: DISCONTINUED | OUTPATIENT
Start: 2023-01-01 | End: 2023-12-30 | Stop reason: HOSPADM

## 2023-01-01 RX ORDER — PROPOFOL 10 MG/ML
INJECTION, EMULSION INTRAVENOUS
Status: COMPLETED
Start: 2023-01-01 | End: 2023-01-01

## 2023-01-01 RX ORDER — ONDANSETRON 4 MG/1
4 TABLET, ORALLY DISINTEGRATING ORAL EVERY 8 HOURS PRN
Status: DISCONTINUED | OUTPATIENT
Start: 2023-01-01 | End: 2023-12-30 | Stop reason: HOSPADM

## 2023-01-01 RX ORDER — NOREPINEPHRINE BITARTRATE 0.06 MG/ML
INJECTION, SOLUTION INTRAVENOUS
Status: DISPENSED
Start: 2023-01-01 | End: 2023-01-01

## 2023-01-01 RX ORDER — ONDANSETRON 2 MG/ML
4 INJECTION INTRAMUSCULAR; INTRAVENOUS EVERY 6 HOURS PRN
Status: DISCONTINUED | OUTPATIENT
Start: 2023-01-01 | End: 2023-12-30 | Stop reason: HOSPADM

## 2023-01-01 RX ORDER — HYDRALAZINE HYDROCHLORIDE 20 MG/ML
10 INJECTION INTRAMUSCULAR; INTRAVENOUS EVERY 6 HOURS PRN
Status: DISCONTINUED | OUTPATIENT
Start: 2023-01-01 | End: 2023-12-30 | Stop reason: HOSPADM

## 2023-01-01 RX ORDER — INSULIN GLARGINE 100 [IU]/ML
30 INJECTION, SOLUTION SUBCUTANEOUS 2 TIMES DAILY
Status: DISCONTINUED | OUTPATIENT
Start: 2023-01-01 | End: 2023-01-01

## 2023-01-01 RX ORDER — ASPIRIN 81 MG/1
81 TABLET, CHEWABLE ORAL DAILY
Status: DISCONTINUED | OUTPATIENT
Start: 2023-01-01 | End: 2023-12-30 | Stop reason: HOSPADM

## 2023-01-01 RX ORDER — LORAZEPAM 2 MG/ML
0.5 INJECTION INTRAMUSCULAR
Status: DISCONTINUED | OUTPATIENT
Start: 2023-01-01 | End: 2023-12-30 | Stop reason: HOSPADM

## 2023-01-01 RX ORDER — LABETALOL HYDROCHLORIDE 5 MG/ML
10 INJECTION, SOLUTION INTRAVENOUS EVERY 4 HOURS PRN
Status: DISCONTINUED | OUTPATIENT
Start: 2023-01-01 | End: 2023-12-30 | Stop reason: HOSPADM

## 2023-01-01 RX ORDER — 0.9 % SODIUM CHLORIDE 0.9 %
30 INTRAVENOUS SOLUTION INTRAVENOUS PRN
Status: DISCONTINUED | OUTPATIENT
Start: 2023-01-01 | End: 2023-12-30 | Stop reason: HOSPADM

## 2023-01-01 RX ORDER — POTASSIUM CHLORIDE 29.8 MG/ML
20 INJECTION INTRAVENOUS PRN
Status: DISCONTINUED | OUTPATIENT
Start: 2023-01-01 | End: 2023-12-30 | Stop reason: HOSPADM

## 2023-01-01 RX ORDER — ENOXAPARIN SODIUM 100 MG/ML
60 INJECTION SUBCUTANEOUS 2 TIMES DAILY
Status: DISCONTINUED | OUTPATIENT
Start: 2023-01-01 | End: 2023-12-30 | Stop reason: HOSPADM

## 2023-01-01 RX ORDER — POTASSIUM CHLORIDE 7.45 MG/ML
10 INJECTION INTRAVENOUS PRN
Status: DISCONTINUED | OUTPATIENT
Start: 2023-01-01 | End: 2023-12-30 | Stop reason: HOSPADM

## 2023-01-01 RX ORDER — MIDAZOLAM HYDROCHLORIDE 2 MG/2ML
1 INJECTION, SOLUTION INTRAMUSCULAR; INTRAVENOUS EVERY 6 HOURS PRN
Status: DISCONTINUED | OUTPATIENT
Start: 2023-01-01 | End: 2023-12-30 | Stop reason: HOSPADM

## 2023-01-01 RX ORDER — INSULIN LISPRO 100 [IU]/ML
0-4 INJECTION, SOLUTION INTRAVENOUS; SUBCUTANEOUS NIGHTLY
Status: DISCONTINUED | OUTPATIENT
Start: 2023-01-01 | End: 2023-01-01 | Stop reason: SDUPTHER

## 2023-01-01 RX ORDER — INSULIN GLARGINE 100 [IU]/ML
20 INJECTION, SOLUTION SUBCUTANEOUS 2 TIMES DAILY
Status: DISCONTINUED | OUTPATIENT
Start: 2023-01-01 | End: 2023-01-01

## 2023-01-01 RX ORDER — LIDOCAINE HYDROCHLORIDE 20 MG/ML
JELLY TOPICAL ONCE
Status: DISCONTINUED | OUTPATIENT
Start: 2023-01-01 | End: 2023-01-01

## 2023-01-01 RX ORDER — INSULIN GLARGINE 100 [IU]/ML
35 INJECTION, SOLUTION SUBCUTANEOUS 2 TIMES DAILY
Status: DISCONTINUED | OUTPATIENT
Start: 2023-01-01 | End: 2023-12-30 | Stop reason: HOSPADM

## 2023-01-01 RX ORDER — MIDAZOLAM HYDROCHLORIDE 1 MG/ML
INJECTION INTRAMUSCULAR; INTRAVENOUS
Status: COMPLETED
Start: 2023-01-01 | End: 2023-01-01

## 2023-01-01 RX ORDER — PROPOFOL 10 MG/ML
5-50 INJECTION, EMULSION INTRAVENOUS CONTINUOUS
Status: DISCONTINUED | OUTPATIENT
Start: 2023-01-01 | End: 2023-12-30 | Stop reason: HOSPADM

## 2023-01-01 RX ORDER — ACETAMINOPHEN 325 MG/1
650 TABLET ORAL EVERY 6 HOURS PRN
Status: DISCONTINUED | OUTPATIENT
Start: 2023-01-01 | End: 2023-12-30 | Stop reason: HOSPADM

## 2023-01-01 RX ORDER — INSULIN GLARGINE 100 [IU]/ML
15 INJECTION, SOLUTION SUBCUTANEOUS ONCE
Status: COMPLETED | OUTPATIENT
Start: 2023-01-01 | End: 2023-01-01

## 2023-01-01 RX ORDER — SODIUM CHLORIDE 9 MG/ML
INJECTION, SOLUTION INTRAVENOUS CONTINUOUS
Status: DISCONTINUED | OUTPATIENT
Start: 2023-01-01 | End: 2023-01-01

## 2023-01-01 RX ORDER — ACETAMINOPHEN 650 MG/1
650 SUPPOSITORY RECTAL EVERY 6 HOURS PRN
Status: DISCONTINUED | OUTPATIENT
Start: 2023-01-01 | End: 2023-12-30 | Stop reason: HOSPADM

## 2023-01-01 RX ORDER — PROPOFOL 10 MG/ML
5-50 INJECTION, EMULSION INTRAVENOUS CONTINUOUS
Status: DISCONTINUED | OUTPATIENT
Start: 2023-01-01 | End: 2023-01-01 | Stop reason: HOSPADM

## 2023-01-01 RX ORDER — INSULIN LISPRO 100 [IU]/ML
0-4 INJECTION, SOLUTION INTRAVENOUS; SUBCUTANEOUS
Status: DISCONTINUED | OUTPATIENT
Start: 2023-01-01 | End: 2023-01-01

## 2023-01-01 RX ADMIN — INSULIN LISPRO 8 UNITS: 100 INJECTION, SOLUTION INTRAVENOUS; SUBCUTANEOUS at 03:48

## 2023-01-01 RX ADMIN — PROPOFOL 25 MCG/KG/MIN: 10 INJECTION, EMULSION INTRAVENOUS at 00:59

## 2023-01-01 RX ADMIN — SODIUM CHLORIDE, PRESERVATIVE FREE 10 ML: 5 INJECTION INTRAVENOUS at 21:14

## 2023-01-01 RX ADMIN — Medication 200 MCG/HR: at 00:29

## 2023-01-01 RX ADMIN — INSULIN LISPRO 12 UNITS: 100 INJECTION, SOLUTION INTRAVENOUS; SUBCUTANEOUS at 20:46

## 2023-01-01 RX ADMIN — DEXAMETHASONE SODIUM PHOSPHATE 6 MG: 4 INJECTION INTRA-ARTICULAR; INTRALESIONAL; INTRAMUSCULAR; INTRAVENOUS; SOFT TISSUE at 10:26

## 2023-01-01 RX ADMIN — OXYCODONE 10 MG: 5 TABLET ORAL at 15:57

## 2023-01-01 RX ADMIN — DEXAMETHASONE SODIUM PHOSPHATE 6 MG: 4 INJECTION INTRA-ARTICULAR; INTRALESIONAL; INTRAMUSCULAR; INTRAVENOUS; SOFT TISSUE at 10:07

## 2023-01-01 RX ADMIN — INSULIN GLARGINE 30 UNITS: 100 INJECTION, SOLUTION SUBCUTANEOUS at 21:05

## 2023-01-01 RX ADMIN — PROPOFOL 20 MCG/KG/MIN: 10 INJECTION, EMULSION INTRAVENOUS at 10:28

## 2023-01-01 RX ADMIN — PROPOFOL 20 MCG/KG/MIN: 10 INJECTION, EMULSION INTRAVENOUS at 14:43

## 2023-01-01 RX ADMIN — PROPOFOL 15 MCG/KG/MIN: 10 INJECTION, EMULSION INTRAVENOUS at 15:52

## 2023-01-01 RX ADMIN — PIPERACILLIN AND TAZOBACTAM 3375 MG: 3; .375 INJECTION, POWDER, FOR SOLUTION INTRAVENOUS; PARENTERAL at 13:46

## 2023-01-01 RX ADMIN — PROPOFOL 20 MCG/KG/MIN: 10 INJECTION, EMULSION INTRAVENOUS at 21:06

## 2023-01-01 RX ADMIN — PROPOFOL 15 MCG/KG/MIN: 10 INJECTION, EMULSION INTRAVENOUS at 05:39

## 2023-01-01 RX ADMIN — INSULIN LISPRO 4 UNITS: 100 INJECTION, SOLUTION INTRAVENOUS; SUBCUTANEOUS at 04:28

## 2023-01-01 RX ADMIN — INSULIN GLARGINE 15 UNITS: 100 INJECTION, SOLUTION SUBCUTANEOUS at 07:57

## 2023-01-01 RX ADMIN — PROPOFOL 20 MCG/KG/MIN: 10 INJECTION, EMULSION INTRAVENOUS at 23:12

## 2023-01-01 RX ADMIN — INSULIN GLARGINE 30 UNITS: 100 INJECTION, SOLUTION SUBCUTANEOUS at 20:43

## 2023-01-01 RX ADMIN — INSULIN LISPRO 4 UNITS: 100 INJECTION, SOLUTION INTRAVENOUS; SUBCUTANEOUS at 12:08

## 2023-01-01 RX ADMIN — INSULIN LISPRO 6 UNITS: 100 INJECTION, SOLUTION INTRAVENOUS; SUBCUTANEOUS at 19:44

## 2023-01-01 RX ADMIN — Medication 10 MG: at 13:34

## 2023-01-01 RX ADMIN — Medication 0.5 MG: at 17:50

## 2023-01-01 RX ADMIN — IPRATROPIUM BROMIDE AND ALBUTEROL SULFATE 1 DOSE: 2.5; .5 SOLUTION RESPIRATORY (INHALATION) at 19:51

## 2023-01-01 RX ADMIN — OXYCODONE 10 MG: 5 TABLET ORAL at 01:26

## 2023-01-01 RX ADMIN — PIPERACILLIN AND TAZOBACTAM 3375 MG: 3; .375 INJECTION, POWDER, FOR SOLUTION INTRAVENOUS; PARENTERAL at 21:20

## 2023-01-01 RX ADMIN — GLYCOPYRROLATE 0.2 MG: 0.2 INJECTION INTRAMUSCULAR; INTRAVENOUS at 17:34

## 2023-01-01 RX ADMIN — DEXAMETHASONE SODIUM PHOSPHATE 6 MG: 4 INJECTION INTRA-ARTICULAR; INTRALESIONAL; INTRAMUSCULAR; INTRAVENOUS; SOFT TISSUE at 10:50

## 2023-01-01 RX ADMIN — SODIUM CHLORIDE, PRESERVATIVE FREE 10 ML: 5 INJECTION INTRAVENOUS at 07:58

## 2023-01-01 RX ADMIN — ACETAMINOPHEN 650 MG: 325 TABLET ORAL at 08:13

## 2023-01-01 RX ADMIN — IPRATROPIUM BROMIDE AND ALBUTEROL SULFATE 1 DOSE: 2.5; .5 SOLUTION RESPIRATORY (INHALATION) at 11:16

## 2023-01-01 RX ADMIN — HYDRALAZINE HYDROCHLORIDE 10 MG: 20 INJECTION, SOLUTION INTRAMUSCULAR; INTRAVENOUS at 05:54

## 2023-01-01 RX ADMIN — ASPIRIN 81 MG: 81 TABLET, CHEWABLE ORAL at 09:03

## 2023-01-01 RX ADMIN — PROPOFOL 30 MCG/KG/MIN: 10 INJECTION, EMULSION INTRAVENOUS at 19:18

## 2023-01-01 RX ADMIN — PROPOFOL 35 MCG/KG/MIN: 10 INJECTION, EMULSION INTRAVENOUS at 01:24

## 2023-01-01 RX ADMIN — DEXAMETHASONE SODIUM PHOSPHATE 6 MG: 4 INJECTION INTRA-ARTICULAR; INTRALESIONAL; INTRAMUSCULAR; INTRAVENOUS; SOFT TISSUE at 10:32

## 2023-01-01 RX ADMIN — PROPOFOL 25 MCG/KG/MIN: 10 INJECTION, EMULSION INTRAVENOUS at 17:05

## 2023-01-01 RX ADMIN — IPRATROPIUM BROMIDE AND ALBUTEROL SULFATE 1 DOSE: 2.5; .5 SOLUTION RESPIRATORY (INHALATION) at 11:32

## 2023-01-01 RX ADMIN — PROPOFOL 30 MCG/KG/MIN: 10 INJECTION, EMULSION INTRAVENOUS at 04:23

## 2023-01-01 RX ADMIN — SODIUM CHLORIDE, PRESERVATIVE FREE 40 MG: 5 INJECTION INTRAVENOUS at 08:29

## 2023-01-01 RX ADMIN — PROPOFOL 20 MCG/KG/MIN: 10 INJECTION, EMULSION INTRAVENOUS at 06:24

## 2023-01-01 RX ADMIN — PIPERACILLIN AND TAZOBACTAM 3375 MG: 3; .375 INJECTION, POWDER, FOR SOLUTION INTRAVENOUS; PARENTERAL at 22:32

## 2023-01-01 RX ADMIN — INSULIN LISPRO 12 UNITS: 100 INJECTION, SOLUTION INTRAVENOUS; SUBCUTANEOUS at 15:42

## 2023-01-01 RX ADMIN — REMDESIVIR 100 MG: 100 INJECTION, POWDER, LYOPHILIZED, FOR SOLUTION INTRAVENOUS at 14:47

## 2023-01-01 RX ADMIN — ENOXAPARIN SODIUM 60 MG: 100 INJECTION SUBCUTANEOUS at 08:05

## 2023-01-01 RX ADMIN — ASPIRIN 81 MG: 81 TABLET, CHEWABLE ORAL at 08:05

## 2023-01-01 RX ADMIN — PROPOFOL 30 MCG/KG/MIN: 10 INJECTION, EMULSION INTRAVENOUS at 16:41

## 2023-01-01 RX ADMIN — OXYCODONE 10 MG: 5 TABLET ORAL at 18:04

## 2023-01-01 RX ADMIN — IPRATROPIUM BROMIDE AND ALBUTEROL SULFATE 1 DOSE: 2.5; .5 SOLUTION RESPIRATORY (INHALATION) at 19:37

## 2023-01-01 RX ADMIN — INSULIN GLARGINE 35 UNITS: 100 INJECTION, SOLUTION SUBCUTANEOUS at 20:40

## 2023-01-01 RX ADMIN — PROPOFOL 15 MCG/KG/MIN: 10 INJECTION, EMULSION INTRAVENOUS at 18:13

## 2023-01-01 RX ADMIN — ASPIRIN 81 MG: 81 TABLET, CHEWABLE ORAL at 08:30

## 2023-01-01 RX ADMIN — SODIUM CHLORIDE, PRESERVATIVE FREE 10 ML: 5 INJECTION INTRAVENOUS at 08:31

## 2023-01-01 RX ADMIN — INSULIN LISPRO 8 UNITS: 100 INJECTION, SOLUTION INTRAVENOUS; SUBCUTANEOUS at 21:05

## 2023-01-01 RX ADMIN — PROPOFOL 15 MCG/KG/MIN: 10 INJECTION, EMULSION INTRAVENOUS at 08:19

## 2023-01-01 RX ADMIN — INSULIN LISPRO 4 UNITS: 100 INJECTION, SOLUTION INTRAVENOUS; SUBCUTANEOUS at 15:45

## 2023-01-01 RX ADMIN — INSULIN LISPRO 1 UNITS: 100 INJECTION, SOLUTION INTRAVENOUS; SUBCUTANEOUS at 17:12

## 2023-01-01 RX ADMIN — REMDESIVIR 100 MG: 100 INJECTION, POWDER, LYOPHILIZED, FOR SOLUTION INTRAVENOUS at 12:58

## 2023-01-01 RX ADMIN — Medication 100 MCG/HR: at 13:01

## 2023-01-01 RX ADMIN — SODIUM CHLORIDE: 9 INJECTION, SOLUTION INTRAVENOUS at 09:00

## 2023-01-01 RX ADMIN — PROPOFOL 30 MCG/KG/MIN: 10 INJECTION, EMULSION INTRAVENOUS at 14:34

## 2023-01-01 RX ADMIN — PROPOFOL 25 MCG/KG/MIN: 10 INJECTION, EMULSION INTRAVENOUS at 05:10

## 2023-01-01 RX ADMIN — SODIUM CHLORIDE, PRESERVATIVE FREE 40 MG: 5 INJECTION INTRAVENOUS at 19:44

## 2023-01-01 RX ADMIN — IPRATROPIUM BROMIDE AND ALBUTEROL SULFATE 1 DOSE: 2.5; .5 SOLUTION RESPIRATORY (INHALATION) at 11:22

## 2023-01-01 RX ADMIN — SODIUM CHLORIDE: 9 INJECTION, SOLUTION INTRAVENOUS at 04:53

## 2023-01-01 RX ADMIN — PROPOFOL 20 MCG/KG/MIN: 10 INJECTION, EMULSION INTRAVENOUS at 18:46

## 2023-01-01 RX ADMIN — PROPOFOL 25 MCG/KG/MIN: 10 INJECTION, EMULSION INTRAVENOUS at 23:19

## 2023-01-01 RX ADMIN — SODIUM CHLORIDE, PRESERVATIVE FREE 10 ML: 5 INJECTION INTRAVENOUS at 21:21

## 2023-01-01 RX ADMIN — Medication 200 MCG/HR: at 04:21

## 2023-01-01 RX ADMIN — PROPOFOL 35 MCG/KG/MIN: 10 INJECTION, EMULSION INTRAVENOUS at 21:50

## 2023-01-01 RX ADMIN — ACETAMINOPHEN 650 MG: 325 TABLET ORAL at 15:26

## 2023-01-01 RX ADMIN — PROPOFOL 20 MCG/KG/MIN: 10 INJECTION, EMULSION INTRAVENOUS at 20:22

## 2023-01-01 RX ADMIN — OXYCODONE 10 MG: 5 TABLET ORAL at 20:24

## 2023-01-01 RX ADMIN — ENOXAPARIN SODIUM 60 MG: 100 INJECTION SUBCUTANEOUS at 08:55

## 2023-01-01 RX ADMIN — INSULIN LISPRO 4 UNITS: 100 INJECTION, SOLUTION INTRAVENOUS; SUBCUTANEOUS at 16:29

## 2023-01-01 RX ADMIN — ENOXAPARIN SODIUM 60 MG: 100 INJECTION SUBCUTANEOUS at 20:32

## 2023-01-01 RX ADMIN — SODIUM CHLORIDE, PRESERVATIVE FREE 10 ML: 5 INJECTION INTRAVENOUS at 18:17

## 2023-01-01 RX ADMIN — PIPERACILLIN AND TAZOBACTAM 3375 MG: 3; .375 INJECTION, POWDER, FOR SOLUTION INTRAVENOUS; PARENTERAL at 21:05

## 2023-01-01 RX ADMIN — HYDRALAZINE HYDROCHLORIDE 10 MG: 20 INJECTION INTRAMUSCULAR; INTRAVENOUS at 22:35

## 2023-01-01 RX ADMIN — POTASSIUM CHLORIDE 10 MEQ: 7.46 INJECTION, SOLUTION INTRAVENOUS at 02:39

## 2023-01-01 RX ADMIN — SODIUM CHLORIDE, PRESERVATIVE FREE 10 ML: 5 INJECTION INTRAVENOUS at 08:58

## 2023-01-01 RX ADMIN — INSULIN LISPRO 2 UNITS: 100 INJECTION, SOLUTION INTRAVENOUS; SUBCUTANEOUS at 08:14

## 2023-01-01 RX ADMIN — IPRATROPIUM BROMIDE AND ALBUTEROL SULFATE 1 DOSE: 2.5; .5 SOLUTION RESPIRATORY (INHALATION) at 15:45

## 2023-01-01 RX ADMIN — ENOXAPARIN SODIUM 60 MG: 100 INJECTION SUBCUTANEOUS at 07:58

## 2023-01-01 RX ADMIN — FUROSEMIDE 40 MG: 10 INJECTION, SOLUTION INTRAMUSCULAR; INTRAVENOUS at 08:30

## 2023-01-01 RX ADMIN — PROPOFOL 15 MCG/KG/MIN: 10 INJECTION, EMULSION INTRAVENOUS at 04:11

## 2023-01-01 RX ADMIN — INSULIN LISPRO 4 UNITS: 100 INJECTION, SOLUTION INTRAVENOUS; SUBCUTANEOUS at 08:36

## 2023-01-01 RX ADMIN — VANCOMYCIN HYDROCHLORIDE 1000 MG: 1 INJECTION, POWDER, LYOPHILIZED, FOR SOLUTION INTRAVENOUS at 12:27

## 2023-01-01 RX ADMIN — HYDRALAZINE HYDROCHLORIDE 10 MG: 20 INJECTION INTRAMUSCULAR; INTRAVENOUS at 08:37

## 2023-01-01 RX ADMIN — PROPOFOL 20 MCG/KG/MIN: 10 INJECTION, EMULSION INTRAVENOUS at 00:27

## 2023-01-01 RX ADMIN — IPRATROPIUM BROMIDE AND ALBUTEROL SULFATE 1 DOSE: 2.5; .5 SOLUTION RESPIRATORY (INHALATION) at 09:28

## 2023-01-01 RX ADMIN — PROPOFOL 20 MCG/KG/MIN: 10 INJECTION, EMULSION INTRAVENOUS at 04:11

## 2023-01-01 RX ADMIN — INSULIN LISPRO 4 UNITS: 100 INJECTION, SOLUTION INTRAVENOUS; SUBCUTANEOUS at 02:16

## 2023-01-01 RX ADMIN — PROPOFOL 15 MCG/KG/MIN: 10 INJECTION, EMULSION INTRAVENOUS at 01:32

## 2023-01-01 RX ADMIN — PIPERACILLIN AND TAZOBACTAM 3375 MG: 3; .375 INJECTION, POWDER, FOR SOLUTION INTRAVENOUS; PARENTERAL at 05:15

## 2023-01-01 RX ADMIN — SODIUM CHLORIDE: 9 INJECTION, SOLUTION INTRAVENOUS at 21:17

## 2023-01-01 RX ADMIN — SODIUM CHLORIDE, PRESERVATIVE FREE 10 ML: 5 INJECTION INTRAVENOUS at 08:00

## 2023-01-01 RX ADMIN — SODIUM CHLORIDE, PRESERVATIVE FREE 40 MG: 5 INJECTION INTRAVENOUS at 20:47

## 2023-01-01 RX ADMIN — Medication 125 MCG/HR: at 21:59

## 2023-01-01 RX ADMIN — IPRATROPIUM BROMIDE AND ALBUTEROL SULFATE 1 DOSE: 2.5; .5 SOLUTION RESPIRATORY (INHALATION) at 19:40

## 2023-01-01 RX ADMIN — SODIUM CHLORIDE, PRESERVATIVE FREE 40 MG: 5 INJECTION INTRAVENOUS at 07:58

## 2023-01-01 RX ADMIN — POTASSIUM CHLORIDE 10 MEQ: 7.46 INJECTION, SOLUTION INTRAVENOUS at 03:49

## 2023-01-01 RX ADMIN — PROPOFOL 20 MCG/KG/MIN: 10 INJECTION, EMULSION INTRAVENOUS at 13:01

## 2023-01-01 RX ADMIN — SODIUM CHLORIDE, PRESERVATIVE FREE 10 ML: 5 INJECTION INTRAVENOUS at 20:26

## 2023-01-01 RX ADMIN — IPRATROPIUM BROMIDE AND ALBUTEROL SULFATE 1 DOSE: 2.5; .5 SOLUTION RESPIRATORY (INHALATION) at 11:42

## 2023-01-01 RX ADMIN — IPRATROPIUM BROMIDE AND ALBUTEROL SULFATE 1 DOSE: 2.5; .5 SOLUTION RESPIRATORY (INHALATION) at 15:01

## 2023-01-01 RX ADMIN — PIPERACILLIN AND TAZOBACTAM 3375 MG: 3; .375 INJECTION, POWDER, FOR SOLUTION INTRAVENOUS; PARENTERAL at 21:12

## 2023-01-01 RX ADMIN — PROPOFOL 30 MCG/KG/MIN: 10 INJECTION, EMULSION INTRAVENOUS at 16:11

## 2023-01-01 RX ADMIN — PROPOFOL 20 MCG/KG/MIN: 10 INJECTION, EMULSION INTRAVENOUS at 02:27

## 2023-01-01 RX ADMIN — FUROSEMIDE 40 MG: 10 INJECTION, SOLUTION INTRAMUSCULAR; INTRAVENOUS at 08:49

## 2023-01-01 RX ADMIN — INSULIN LISPRO 2 UNITS: 100 INJECTION, SOLUTION INTRAVENOUS; SUBCUTANEOUS at 14:25

## 2023-01-01 RX ADMIN — ENOXAPARIN SODIUM 60 MG: 100 INJECTION SUBCUTANEOUS at 21:31

## 2023-01-01 RX ADMIN — PROPOFOL 10 MCG/KG/MIN: 10 INJECTION, EMULSION INTRAVENOUS at 15:07

## 2023-01-01 RX ADMIN — PROPOFOL 30 MCG/KG/MIN: 10 INJECTION, EMULSION INTRAVENOUS at 14:54

## 2023-01-01 RX ADMIN — ALBUTEROL SULFATE 2.5 MG: 2.5 SOLUTION RESPIRATORY (INHALATION) at 05:01

## 2023-01-01 RX ADMIN — PROPOFOL 25 MCG/KG/MIN: 10 INJECTION, EMULSION INTRAVENOUS at 02:38

## 2023-01-01 RX ADMIN — PROPOFOL 20 MCG/KG/MIN: 10 INJECTION, EMULSION INTRAVENOUS at 17:15

## 2023-01-01 RX ADMIN — PROPOFOL 25 MCG/KG/MIN: 10 INJECTION, EMULSION INTRAVENOUS at 09:39

## 2023-01-01 RX ADMIN — INSULIN LISPRO 4 UNITS: 100 INJECTION, SOLUTION INTRAVENOUS; SUBCUTANEOUS at 00:21

## 2023-01-01 RX ADMIN — SODIUM CHLORIDE, PRESERVATIVE FREE 10 ML: 5 INJECTION INTRAVENOUS at 13:34

## 2023-01-01 RX ADMIN — Medication 2 MG/HR: at 00:17

## 2023-01-01 RX ADMIN — PIPERACILLIN AND TAZOBACTAM 3375 MG: 3; .375 INJECTION, POWDER, FOR SOLUTION INTRAVENOUS; PARENTERAL at 05:06

## 2023-01-01 RX ADMIN — PROPOFOL 35 MCG/KG/MIN: 10 INJECTION, EMULSION INTRAVENOUS at 23:32

## 2023-01-01 RX ADMIN — ACETAMINOPHEN 650 MG: 325 TABLET ORAL at 10:40

## 2023-01-01 RX ADMIN — IPRATROPIUM BROMIDE AND ALBUTEROL SULFATE 1 DOSE: 2.5; .5 SOLUTION RESPIRATORY (INHALATION) at 15:57

## 2023-01-01 RX ADMIN — IPRATROPIUM BROMIDE AND ALBUTEROL SULFATE 1 DOSE: 2.5; .5 SOLUTION RESPIRATORY (INHALATION) at 12:55

## 2023-01-01 RX ADMIN — SODIUM CHLORIDE, PRESERVATIVE FREE 10 ML: 5 INJECTION INTRAVENOUS at 07:48

## 2023-01-01 RX ADMIN — PROPOFOL 30 MCG/KG/MIN: 10 INJECTION, EMULSION INTRAVENOUS at 06:28

## 2023-01-01 RX ADMIN — PROPOFOL 25 MCG/KG/MIN: 10 INJECTION, EMULSION INTRAVENOUS at 17:48

## 2023-01-01 RX ADMIN — IPRATROPIUM BROMIDE AND ALBUTEROL SULFATE 1 DOSE: 2.5; .5 SOLUTION RESPIRATORY (INHALATION) at 20:16

## 2023-01-01 RX ADMIN — SODIUM CHLORIDE, PRESERVATIVE FREE 10 ML: 5 INJECTION INTRAVENOUS at 19:51

## 2023-01-01 RX ADMIN — Medication 100 MCG/HR: at 00:17

## 2023-01-01 RX ADMIN — INSULIN LISPRO 4 UNITS: 100 INJECTION, SOLUTION INTRAVENOUS; SUBCUTANEOUS at 00:43

## 2023-01-01 RX ADMIN — OXYCODONE 10 MG: 5 TABLET ORAL at 08:10

## 2023-01-01 RX ADMIN — Medication 200 MCG/HR: at 13:08

## 2023-01-01 RX ADMIN — Medication 10 MG: at 08:32

## 2023-01-01 RX ADMIN — IPRATROPIUM BROMIDE AND ALBUTEROL SULFATE 1 DOSE: 2.5; .5 SOLUTION RESPIRATORY (INHALATION) at 11:59

## 2023-01-01 RX ADMIN — PIPERACILLIN AND TAZOBACTAM 3375 MG: 3; .375 INJECTION, POWDER, FOR SOLUTION INTRAVENOUS; PARENTERAL at 06:04

## 2023-01-01 RX ADMIN — Medication 10 MG: at 05:20

## 2023-01-01 RX ADMIN — IPRATROPIUM BROMIDE AND ALBUTEROL SULFATE 1 DOSE: 2.5; .5 SOLUTION RESPIRATORY (INHALATION) at 17:53

## 2023-01-01 RX ADMIN — SODIUM CHLORIDE, PRESERVATIVE FREE 10 ML: 5 INJECTION INTRAVENOUS at 20:17

## 2023-01-01 RX ADMIN — SENNOSIDES AND DOCUSATE SODIUM 1 TABLET: 50; 8.6 TABLET ORAL at 20:25

## 2023-01-01 RX ADMIN — ENOXAPARIN SODIUM 60 MG: 100 INJECTION SUBCUTANEOUS at 20:11

## 2023-01-01 RX ADMIN — INSULIN LISPRO 4 UNITS: 100 INJECTION, SOLUTION INTRAVENOUS; SUBCUTANEOUS at 12:29

## 2023-01-01 RX ADMIN — PROPOFOL 15 MCG/KG/MIN: 10 INJECTION, EMULSION INTRAVENOUS at 22:30

## 2023-01-01 RX ADMIN — PROPOFOL 30 MCG/KG/MIN: 10 INJECTION, EMULSION INTRAVENOUS at 22:08

## 2023-01-01 RX ADMIN — Medication 200 MCG/HR: at 03:06

## 2023-01-01 RX ADMIN — VANCOMYCIN HYDROCHLORIDE 1000 MG: 1 INJECTION, POWDER, LYOPHILIZED, FOR SOLUTION INTRAVENOUS at 21:19

## 2023-01-01 RX ADMIN — PROPOFOL 10 MCG/KG/MIN: 10 INJECTION, EMULSION INTRAVENOUS at 10:59

## 2023-01-01 RX ADMIN — IPRATROPIUM BROMIDE AND ALBUTEROL SULFATE 1 DOSE: 2.5; .5 SOLUTION RESPIRATORY (INHALATION) at 08:11

## 2023-01-01 RX ADMIN — INSULIN LISPRO 4 UNITS: 100 INJECTION, SOLUTION INTRAVENOUS; SUBCUTANEOUS at 07:50

## 2023-01-01 RX ADMIN — POTASSIUM CHLORIDE 10 MEQ: 7.46 INJECTION, SOLUTION INTRAVENOUS at 09:51

## 2023-01-01 RX ADMIN — INSULIN LISPRO 1 UNITS: 100 INJECTION, SOLUTION INTRAVENOUS; SUBCUTANEOUS at 10:57

## 2023-01-01 RX ADMIN — FUROSEMIDE 40 MG: 10 INJECTION, SOLUTION INTRAMUSCULAR; INTRAVENOUS at 18:17

## 2023-01-01 RX ADMIN — IPRATROPIUM BROMIDE AND ALBUTEROL SULFATE 1 DOSE: 2.5; .5 SOLUTION RESPIRATORY (INHALATION) at 20:29

## 2023-01-01 RX ADMIN — POTASSIUM CHLORIDE 10 MEQ: 7.46 INJECTION, SOLUTION INTRAVENOUS at 14:00

## 2023-01-01 RX ADMIN — INSULIN LISPRO 12 UNITS: 100 INJECTION, SOLUTION INTRAVENOUS; SUBCUTANEOUS at 20:32

## 2023-01-01 RX ADMIN — SODIUM CHLORIDE, PRESERVATIVE FREE 40 MG: 5 INJECTION INTRAVENOUS at 19:27

## 2023-01-01 RX ADMIN — PROPOFOL 35 MCG/KG/MIN: 10 INJECTION, EMULSION INTRAVENOUS at 03:44

## 2023-01-01 RX ADMIN — INSULIN LISPRO 2 UNITS: 100 INJECTION, SOLUTION INTRAVENOUS; SUBCUTANEOUS at 17:10

## 2023-01-01 RX ADMIN — IPRATROPIUM BROMIDE AND ALBUTEROL SULFATE 1 DOSE: 2.5; .5 SOLUTION RESPIRATORY (INHALATION) at 11:33

## 2023-01-01 RX ADMIN — SODIUM CHLORIDE, PRESERVATIVE FREE 40 MG: 5 INJECTION INTRAVENOUS at 21:13

## 2023-01-01 RX ADMIN — PROPOFOL 20 MCG/KG/MIN: 10 INJECTION, EMULSION INTRAVENOUS at 23:56

## 2023-01-01 RX ADMIN — PIPERACILLIN AND TAZOBACTAM 3375 MG: 3; .375 INJECTION, POWDER, FOR SOLUTION INTRAVENOUS; PARENTERAL at 08:13

## 2023-01-01 RX ADMIN — PROPOFOL 25 MCG/KG/MIN: 10 INJECTION, EMULSION INTRAVENOUS at 09:00

## 2023-01-01 RX ADMIN — INSULIN GLARGINE 30 UNITS: 100 INJECTION, SOLUTION SUBCUTANEOUS at 08:49

## 2023-01-01 RX ADMIN — IPRATROPIUM BROMIDE AND ALBUTEROL SULFATE 1 DOSE: 2.5; .5 SOLUTION RESPIRATORY (INHALATION) at 20:06

## 2023-01-01 RX ADMIN — INSULIN LISPRO 4 UNITS: 100 INJECTION, SOLUTION INTRAVENOUS; SUBCUTANEOUS at 16:40

## 2023-01-01 RX ADMIN — PROPOFOL 15 MCG/KG/MIN: 10 INJECTION, EMULSION INTRAVENOUS at 16:23

## 2023-01-01 RX ADMIN — IPRATROPIUM BROMIDE AND ALBUTEROL SULFATE 1 DOSE: 2.5; .5 SOLUTION RESPIRATORY (INHALATION) at 15:59

## 2023-01-01 RX ADMIN — ACETAMINOPHEN 650 MG: 325 TABLET ORAL at 06:21

## 2023-01-01 RX ADMIN — IPRATROPIUM BROMIDE AND ALBUTEROL SULFATE 1 DOSE: 2.5; .5 SOLUTION RESPIRATORY (INHALATION) at 16:30

## 2023-01-01 RX ADMIN — INSULIN LISPRO 8 UNITS: 100 INJECTION, SOLUTION INTRAVENOUS; SUBCUTANEOUS at 04:24

## 2023-01-01 RX ADMIN — FUROSEMIDE 40 MG: 10 INJECTION, SOLUTION INTRAMUSCULAR; INTRAVENOUS at 08:15

## 2023-01-01 RX ADMIN — MIDAZOLAM HYDROCHLORIDE 1 MG: 1 INJECTION, SOLUTION INTRAMUSCULAR; INTRAVENOUS at 02:13

## 2023-01-01 RX ADMIN — ENOXAPARIN SODIUM 60 MG: 100 INJECTION SUBCUTANEOUS at 19:45

## 2023-01-01 RX ADMIN — MIDAZOLAM 2 MG: 1 INJECTION INTRAMUSCULAR; INTRAVENOUS at 23:58

## 2023-01-01 RX ADMIN — FUROSEMIDE 40 MG: 10 INJECTION, SOLUTION INTRAMUSCULAR; INTRAVENOUS at 07:57

## 2023-01-01 RX ADMIN — PIPERACILLIN AND TAZOBACTAM 3375 MG: 3; .375 INJECTION, POWDER, FOR SOLUTION INTRAVENOUS; PARENTERAL at 05:00

## 2023-01-01 RX ADMIN — PROPOFOL 15 MCG/KG/MIN: 10 INJECTION, EMULSION INTRAVENOUS at 17:17

## 2023-01-01 RX ADMIN — Medication 200 MCG/HR: at 02:30

## 2023-01-01 RX ADMIN — INSULIN LISPRO 4 UNITS: 100 INJECTION, SOLUTION INTRAVENOUS; SUBCUTANEOUS at 19:44

## 2023-01-01 RX ADMIN — ENOXAPARIN SODIUM 60 MG: 100 INJECTION SUBCUTANEOUS at 20:46

## 2023-01-01 RX ADMIN — ENOXAPARIN SODIUM 60 MG: 100 INJECTION SUBCUTANEOUS at 08:00

## 2023-01-01 RX ADMIN — SODIUM CHLORIDE, PRESERVATIVE FREE 40 MG: 5 INJECTION INTRAVENOUS at 08:28

## 2023-01-01 RX ADMIN — Medication 81 MG: at 08:00

## 2023-01-01 RX ADMIN — INSULIN GLARGINE 30 UNITS: 100 INJECTION, SOLUTION SUBCUTANEOUS at 21:16

## 2023-01-01 RX ADMIN — SODIUM CHLORIDE, PRESERVATIVE FREE 10 ML: 5 INJECTION INTRAVENOUS at 07:59

## 2023-01-01 RX ADMIN — INSULIN GLARGINE 25 UNITS: 100 INJECTION, SOLUTION SUBCUTANEOUS at 20:47

## 2023-01-01 RX ADMIN — INSULIN GLARGINE 30 UNITS: 100 INJECTION, SOLUTION SUBCUTANEOUS at 08:10

## 2023-01-01 RX ADMIN — OXYCODONE 10 MG: 5 TABLET ORAL at 09:49

## 2023-01-01 RX ADMIN — PIPERACILLIN AND TAZOBACTAM 3375 MG: 3; .375 INJECTION, POWDER, FOR SOLUTION INTRAVENOUS; PARENTERAL at 14:29

## 2023-01-01 RX ADMIN — PROPOFOL 30 MCG/KG/MIN: 10 INJECTION, EMULSION INTRAVENOUS at 09:02

## 2023-01-01 RX ADMIN — SODIUM CHLORIDE, PRESERVATIVE FREE 10 ML: 5 INJECTION INTRAVENOUS at 19:45

## 2023-01-01 RX ADMIN — SODIUM CHLORIDE, PRESERVATIVE FREE 40 MG: 5 INJECTION INTRAVENOUS at 07:57

## 2023-01-01 RX ADMIN — OXYCODONE 10 MG: 5 TABLET ORAL at 13:10

## 2023-01-01 RX ADMIN — ALBUTEROL SULFATE 2.5 MG: 2.5 SOLUTION RESPIRATORY (INHALATION) at 23:30

## 2023-01-01 RX ADMIN — ASPIRIN 81 MG: 81 TABLET, CHEWABLE ORAL at 07:58

## 2023-01-01 RX ADMIN — PROPOFOL 25 MCG/KG/MIN: 10 INJECTION, EMULSION INTRAVENOUS at 14:34

## 2023-01-01 RX ADMIN — SODIUM CHLORIDE 1000 ML: 9 INJECTION, SOLUTION INTRAVENOUS at 21:20

## 2023-01-01 RX ADMIN — PROPOFOL 25 MCG/KG/MIN: 10 INJECTION, EMULSION INTRAVENOUS at 19:31

## 2023-01-01 RX ADMIN — SODIUM CHLORIDE, PRESERVATIVE FREE 40 MG: 5 INJECTION INTRAVENOUS at 18:45

## 2023-01-01 RX ADMIN — INSULIN LISPRO 4 UNITS: 100 INJECTION, SOLUTION INTRAVENOUS; SUBCUTANEOUS at 12:13

## 2023-01-01 RX ADMIN — SENNOSIDES AND DOCUSATE SODIUM 1 TABLET: 50; 8.6 TABLET ORAL at 08:14

## 2023-01-01 RX ADMIN — IPRATROPIUM BROMIDE AND ALBUTEROL SULFATE 1 DOSE: 2.5; .5 SOLUTION RESPIRATORY (INHALATION) at 07:35

## 2023-01-01 RX ADMIN — INSULIN GLARGINE 30 UNITS: 100 INJECTION, SOLUTION SUBCUTANEOUS at 20:32

## 2023-01-01 RX ADMIN — ENOXAPARIN SODIUM 60 MG: 100 INJECTION SUBCUTANEOUS at 08:36

## 2023-01-01 RX ADMIN — PROPOFOL 25 MCG/KG/MIN: 10 INJECTION, EMULSION INTRAVENOUS at 20:41

## 2023-01-01 RX ADMIN — IPRATROPIUM BROMIDE AND ALBUTEROL SULFATE 1 DOSE: 2.5; .5 SOLUTION RESPIRATORY (INHALATION) at 07:40

## 2023-01-01 RX ADMIN — PROPOFOL 20 MCG/KG/MIN: 10 INJECTION, EMULSION INTRAVENOUS at 08:11

## 2023-01-01 RX ADMIN — Medication 10 MG: at 13:46

## 2023-01-01 RX ADMIN — REMDESIVIR 100 MG: 100 INJECTION, POWDER, LYOPHILIZED, FOR SOLUTION INTRAVENOUS at 13:06

## 2023-01-01 RX ADMIN — PROPOFOL 25 MCG/KG/MIN: 10 INJECTION, EMULSION INTRAVENOUS at 07:18

## 2023-01-01 RX ADMIN — IPRATROPIUM BROMIDE AND ALBUTEROL SULFATE 1 DOSE: 2.5; .5 SOLUTION RESPIRATORY (INHALATION) at 11:15

## 2023-01-01 RX ADMIN — IPRATROPIUM BROMIDE AND ALBUTEROL SULFATE 1 DOSE: 2.5; .5 SOLUTION RESPIRATORY (INHALATION) at 15:17

## 2023-01-01 RX ADMIN — POLYETHYLENE GLYCOL 3350 17 G: 17 POWDER, FOR SOLUTION ORAL at 19:27

## 2023-01-01 RX ADMIN — SENNOSIDES AND DOCUSATE SODIUM 1 TABLET: 50; 8.6 TABLET ORAL at 13:10

## 2023-01-01 RX ADMIN — PROPOFOL 20 MCG/KG/MIN: 10 INJECTION, EMULSION INTRAVENOUS at 21:54

## 2023-01-01 RX ADMIN — PROPOFOL 20 MCG/KG/MIN: 10 INJECTION, EMULSION INTRAVENOUS at 13:45

## 2023-01-01 RX ADMIN — SODIUM CHLORIDE, PRESERVATIVE FREE 40 MG: 5 INJECTION INTRAVENOUS at 21:04

## 2023-01-01 RX ADMIN — PIPERACILLIN AND TAZOBACTAM 3375 MG: 3; .375 INJECTION, POWDER, FOR SOLUTION INTRAVENOUS; PARENTERAL at 11:59

## 2023-01-01 RX ADMIN — ASPIRIN 81 MG: 81 TABLET, CHEWABLE ORAL at 08:10

## 2023-01-01 RX ADMIN — PROPOFOL 10 MCG/KG/MIN: 10 INJECTION, EMULSION INTRAVENOUS at 08:40

## 2023-01-01 RX ADMIN — ACETAMINOPHEN 650 MG: 325 TABLET ORAL at 04:29

## 2023-01-01 RX ADMIN — INSULIN LISPRO 4 UNITS: 100 INJECTION, SOLUTION INTRAVENOUS; SUBCUTANEOUS at 20:43

## 2023-01-01 RX ADMIN — SODIUM CHLORIDE, PRESERVATIVE FREE 10 ML: 5 INJECTION INTRAVENOUS at 08:13

## 2023-01-01 RX ADMIN — INSULIN LISPRO 4 UNITS: 100 INJECTION, SOLUTION INTRAVENOUS; SUBCUTANEOUS at 11:45

## 2023-01-01 RX ADMIN — ASPIRIN 81 MG: 81 TABLET, CHEWABLE ORAL at 08:28

## 2023-01-01 RX ADMIN — SODIUM CHLORIDE, PRESERVATIVE FREE 40 MG: 5 INJECTION INTRAVENOUS at 20:41

## 2023-01-01 RX ADMIN — SENNOSIDES AND DOCUSATE SODIUM 1 TABLET: 50; 8.6 TABLET ORAL at 20:44

## 2023-01-01 RX ADMIN — IPRATROPIUM BROMIDE AND ALBUTEROL SULFATE 1 DOSE: 2.5; .5 SOLUTION RESPIRATORY (INHALATION) at 08:25

## 2023-01-01 RX ADMIN — Medication 10 MG: at 13:10

## 2023-01-01 RX ADMIN — SODIUM CHLORIDE, PRESERVATIVE FREE 10 ML: 5 INJECTION INTRAVENOUS at 08:08

## 2023-01-01 RX ADMIN — SODIUM CHLORIDE, PRESERVATIVE FREE 10 ML: 5 INJECTION INTRAVENOUS at 07:34

## 2023-01-01 RX ADMIN — ACETAMINOPHEN 650 MG: 325 TABLET ORAL at 02:41

## 2023-01-01 RX ADMIN — INSULIN LISPRO 8 UNITS: 100 INJECTION, SOLUTION INTRAVENOUS; SUBCUTANEOUS at 08:41

## 2023-01-01 RX ADMIN — Medication 100 MCG/HR: at 10:31

## 2023-01-01 RX ADMIN — INSULIN GLARGINE 30 UNITS: 100 INJECTION, SOLUTION SUBCUTANEOUS at 07:58

## 2023-01-01 RX ADMIN — PROPOFOL 30 MCG/KG/MIN: 10 INJECTION, EMULSION INTRAVENOUS at 17:12

## 2023-01-01 RX ADMIN — Medication 10 MG: at 01:12

## 2023-01-01 RX ADMIN — Medication 150 MCG/HR: at 08:27

## 2023-01-01 RX ADMIN — ACETAMINOPHEN 650 MG: 325 TABLET ORAL at 23:13

## 2023-01-01 RX ADMIN — PROPOFOL 25 MCG/KG/MIN: 10 INJECTION, EMULSION INTRAVENOUS at 02:18

## 2023-01-01 RX ADMIN — ENOXAPARIN SODIUM 60 MG: 100 INJECTION SUBCUTANEOUS at 20:31

## 2023-01-01 RX ADMIN — FUROSEMIDE 40 MG: 10 INJECTION, SOLUTION INTRAMUSCULAR; INTRAVENOUS at 18:06

## 2023-01-01 RX ADMIN — ENOXAPARIN SODIUM 60 MG: 100 INJECTION SUBCUTANEOUS at 10:26

## 2023-01-01 RX ADMIN — IPRATROPIUM BROMIDE AND ALBUTEROL SULFATE 1 DOSE: 2.5; .5 SOLUTION RESPIRATORY (INHALATION) at 08:53

## 2023-01-01 RX ADMIN — VANCOMYCIN HYDROCHLORIDE 1000 MG: 1 INJECTION, POWDER, LYOPHILIZED, FOR SOLUTION INTRAVENOUS at 04:49

## 2023-01-01 RX ADMIN — SODIUM CHLORIDE, PRESERVATIVE FREE 10 ML: 5 INJECTION INTRAVENOUS at 20:47

## 2023-01-01 RX ADMIN — PIPERACILLIN AND TAZOBACTAM 3375 MG: 3; .375 INJECTION, POWDER, FOR SOLUTION INTRAVENOUS; PARENTERAL at 13:28

## 2023-01-01 RX ADMIN — PROPOFOL 25 MCG/KG/MIN: 10 INJECTION, EMULSION INTRAVENOUS at 19:42

## 2023-01-01 RX ADMIN — PROPOFOL 20 MCG/KG/MIN: 10 INJECTION, EMULSION INTRAVENOUS at 01:03

## 2023-01-01 RX ADMIN — INSULIN LISPRO 4 UNITS: 100 INJECTION, SOLUTION INTRAVENOUS; SUBCUTANEOUS at 20:31

## 2023-01-01 RX ADMIN — INSULIN LISPRO 12 UNITS: 100 INJECTION, SOLUTION INTRAVENOUS; SUBCUTANEOUS at 00:34

## 2023-01-01 RX ADMIN — PROPOFOL 30 MCG/KG/MIN: 10 INJECTION, EMULSION INTRAVENOUS at 11:52

## 2023-01-01 RX ADMIN — PROPOFOL 25 MCG/KG/MIN: 10 INJECTION, EMULSION INTRAVENOUS at 14:38

## 2023-01-01 RX ADMIN — SODIUM CHLORIDE, PRESERVATIVE FREE 40 MG: 5 INJECTION INTRAVENOUS at 08:13

## 2023-01-01 RX ADMIN — OXYCODONE 10 MG: 5 TABLET ORAL at 14:35

## 2023-01-01 RX ADMIN — SODIUM CHLORIDE, PRESERVATIVE FREE 10 ML: 5 INJECTION INTRAVENOUS at 19:28

## 2023-01-01 RX ADMIN — PIPERACILLIN AND TAZOBACTAM 3375 MG: 3; .375 INJECTION, POWDER, FOR SOLUTION INTRAVENOUS; PARENTERAL at 05:12

## 2023-01-01 RX ADMIN — IPRATROPIUM BROMIDE AND ALBUTEROL SULFATE 1 DOSE: 2.5; .5 SOLUTION RESPIRATORY (INHALATION) at 15:40

## 2023-01-01 RX ADMIN — OXYCODONE 10 MG: 5 TABLET ORAL at 20:42

## 2023-01-01 RX ADMIN — SODIUM CHLORIDE, PRESERVATIVE FREE 40 MG: 5 INJECTION INTRAVENOUS at 09:00

## 2023-01-01 RX ADMIN — PROPOFOL 25 MCG/KG/MIN: 10 INJECTION, EMULSION INTRAVENOUS at 11:20

## 2023-01-01 RX ADMIN — PROPOFOL 25 MCG/KG/MIN: 10 INJECTION, EMULSION INTRAVENOUS at 23:23

## 2023-01-01 RX ADMIN — PROPOFOL 30 MCG/KG/MIN: 10 INJECTION, EMULSION INTRAVENOUS at 05:57

## 2023-01-01 RX ADMIN — INSULIN LISPRO 4 UNITS: 100 INJECTION, SOLUTION INTRAVENOUS; SUBCUTANEOUS at 08:13

## 2023-01-01 RX ADMIN — PROPOFOL 30 MCG/KG/MIN: 10 INJECTION, EMULSION INTRAVENOUS at 09:01

## 2023-01-01 RX ADMIN — ASPIRIN 81 MG: 81 TABLET, CHEWABLE ORAL at 08:14

## 2023-01-01 RX ADMIN — SODIUM CHLORIDE 1000 ML: 9 INJECTION, SOLUTION INTRAVENOUS at 05:19

## 2023-01-01 RX ADMIN — Medication 2000 MG: at 18:39

## 2023-01-01 RX ADMIN — IPRATROPIUM BROMIDE AND ALBUTEROL SULFATE 1 DOSE: 2.5; .5 SOLUTION RESPIRATORY (INHALATION) at 20:34

## 2023-01-01 RX ADMIN — SODIUM CHLORIDE, PRESERVATIVE FREE 40 MG: 5 INJECTION INTRAVENOUS at 19:45

## 2023-01-01 RX ADMIN — PROPOFOL 20 MCG/KG/MIN: 10 INJECTION, EMULSION INTRAVENOUS at 04:37

## 2023-01-01 RX ADMIN — Medication 2000 MG: at 17:05

## 2023-01-01 RX ADMIN — DEXAMETHASONE SODIUM PHOSPHATE 6 MG: 4 INJECTION INTRA-ARTICULAR; INTRALESIONAL; INTRAMUSCULAR; INTRAVENOUS; SOFT TISSUE at 10:15

## 2023-01-01 RX ADMIN — PROPOFOL 20 MCG/KG/MIN: 10 INJECTION, EMULSION INTRAVENOUS at 10:30

## 2023-01-01 RX ADMIN — Medication 150 MCG/HR: at 06:05

## 2023-01-01 RX ADMIN — INSULIN GLARGINE 20 UNITS: 100 INJECTION, SOLUTION SUBCUTANEOUS at 20:31

## 2023-01-01 RX ADMIN — PROPOFOL 35 MCG/KG/MIN: 10 INJECTION, EMULSION INTRAVENOUS at 23:37

## 2023-01-01 RX ADMIN — IPRATROPIUM BROMIDE AND ALBUTEROL SULFATE 1 DOSE: 2.5; .5 SOLUTION RESPIRATORY (INHALATION) at 07:52

## 2023-01-01 RX ADMIN — OXYCODONE 10 MG: 5 TABLET ORAL at 14:34

## 2023-01-01 RX ADMIN — VANCOMYCIN HYDROCHLORIDE 1000 MG: 1 INJECTION, POWDER, LYOPHILIZED, FOR SOLUTION INTRAVENOUS at 06:25

## 2023-01-01 RX ADMIN — ASPIRIN 81 MG: 81 TABLET, CHEWABLE ORAL at 07:46

## 2023-01-01 RX ADMIN — OXYCODONE 10 MG: 5 TABLET ORAL at 02:41

## 2023-01-01 RX ADMIN — INSULIN GLARGINE 35 UNITS: 100 INJECTION, SOLUTION SUBCUTANEOUS at 08:14

## 2023-01-01 RX ADMIN — PIPERACILLIN AND TAZOBACTAM 3375 MG: 3; .375 INJECTION, POWDER, FOR SOLUTION INTRAVENOUS; PARENTERAL at 16:11

## 2023-01-01 RX ADMIN — MORPHINE SULFATE 2 MG: 2 INJECTION, SOLUTION INTRAMUSCULAR; INTRAVENOUS at 17:50

## 2023-01-01 RX ADMIN — INSULIN LISPRO 4 UNITS: 100 INJECTION, SOLUTION INTRAVENOUS; SUBCUTANEOUS at 15:57

## 2023-01-01 RX ADMIN — SUCCINYLCHOLINE CHLORIDE 200 MG: 20 INJECTION, SOLUTION INTRAMUSCULAR; INTRAVENOUS at 19:55

## 2023-01-01 RX ADMIN — INSULIN LISPRO 4 UNITS: 100 INJECTION, SOLUTION INTRAVENOUS; SUBCUTANEOUS at 05:21

## 2023-01-01 RX ADMIN — ACETAMINOPHEN 650 MG: 325 TABLET ORAL at 08:14

## 2023-01-01 RX ADMIN — SALINE NASAL SPRAY 1 SPRAY: 1.5 SOLUTION NASAL at 15:27

## 2023-01-01 RX ADMIN — SODIUM CHLORIDE, PRESERVATIVE FREE 40 MG: 5 INJECTION INTRAVENOUS at 20:24

## 2023-01-01 RX ADMIN — IPRATROPIUM BROMIDE AND ALBUTEROL SULFATE 1 DOSE: 2.5; .5 SOLUTION RESPIRATORY (INHALATION) at 08:35

## 2023-01-01 RX ADMIN — IPRATROPIUM BROMIDE AND ALBUTEROL SULFATE 1 DOSE: 2.5; .5 SOLUTION RESPIRATORY (INHALATION) at 20:22

## 2023-01-01 RX ADMIN — PROPOFOL 25 MCG/KG/MIN: 10 INJECTION, EMULSION INTRAVENOUS at 12:08

## 2023-01-01 RX ADMIN — PROPOFOL 30 MCG/KG/MIN: 10 INJECTION, EMULSION INTRAVENOUS at 13:27

## 2023-01-01 RX ADMIN — IPRATROPIUM BROMIDE AND ALBUTEROL SULFATE 1 DOSE: 2.5; .5 SOLUTION RESPIRATORY (INHALATION) at 15:27

## 2023-01-01 RX ADMIN — POTASSIUM CHLORIDE 10 MEQ: 7.46 INJECTION, SOLUTION INTRAVENOUS at 12:29

## 2023-01-01 RX ADMIN — SODIUM CHLORIDE, PRESERVATIVE FREE 10 ML: 5 INJECTION INTRAVENOUS at 08:29

## 2023-01-01 RX ADMIN — FUROSEMIDE 40 MG: 10 INJECTION, SOLUTION INTRAMUSCULAR; INTRAVENOUS at 09:24

## 2023-01-01 RX ADMIN — ASPIRIN 81 MG: 81 TABLET, CHEWABLE ORAL at 08:49

## 2023-01-01 RX ADMIN — PROPOFOL 20 MCG/KG/MIN: 10 INJECTION, EMULSION INTRAVENOUS at 08:04

## 2023-01-01 RX ADMIN — INSULIN LISPRO 1 UNITS: 100 INJECTION, SOLUTION INTRAVENOUS; SUBCUTANEOUS at 17:09

## 2023-01-01 RX ADMIN — Medication 7 MG/HR: at 20:19

## 2023-01-01 RX ADMIN — ENOXAPARIN SODIUM 60 MG: 100 INJECTION SUBCUTANEOUS at 22:20

## 2023-01-01 RX ADMIN — INSULIN LISPRO 8 UNITS: 100 INJECTION, SOLUTION INTRAVENOUS; SUBCUTANEOUS at 05:13

## 2023-01-01 RX ADMIN — NALOXONE HYDROCHLORIDE 4 MG: 1 INJECTION PARENTERAL at 19:57

## 2023-01-01 RX ADMIN — SODIUM CHLORIDE, PRESERVATIVE FREE 40 MG: 5 INJECTION INTRAVENOUS at 08:30

## 2023-01-01 RX ADMIN — SODIUM CHLORIDE, PRESERVATIVE FREE 40 MG: 5 INJECTION INTRAVENOUS at 09:03

## 2023-01-01 RX ADMIN — OXYCODONE 10 MG: 5 TABLET ORAL at 07:58

## 2023-01-01 RX ADMIN — Medication 0.5 MG: at 17:34

## 2023-01-01 RX ADMIN — SODIUM CHLORIDE, PRESERVATIVE FREE 10 ML: 5 INJECTION INTRAVENOUS at 19:44

## 2023-01-01 RX ADMIN — PROPOFOL 25 MCG/KG/MIN: 10 INJECTION, EMULSION INTRAVENOUS at 12:28

## 2023-01-01 RX ADMIN — Medication 100 MCG/HR: at 18:11

## 2023-01-01 RX ADMIN — Medication 200 MCG/HR: at 15:45

## 2023-01-01 RX ADMIN — SENNOSIDES AND DOCUSATE SODIUM 1 TABLET: 50; 8.6 TABLET ORAL at 07:58

## 2023-01-01 RX ADMIN — SODIUM CHLORIDE: 9 INJECTION, SOLUTION INTRAVENOUS at 08:07

## 2023-01-01 RX ADMIN — ACETAMINOPHEN 650 MG: 325 TABLET ORAL at 11:42

## 2023-01-01 RX ADMIN — IPRATROPIUM BROMIDE AND ALBUTEROL SULFATE 1 DOSE: 2.5; .5 SOLUTION RESPIRATORY (INHALATION) at 20:01

## 2023-01-01 RX ADMIN — IPRATROPIUM BROMIDE AND ALBUTEROL SULFATE 1 DOSE: 2.5; .5 SOLUTION RESPIRATORY (INHALATION) at 08:34

## 2023-01-01 RX ADMIN — SODIUM CHLORIDE, PRESERVATIVE FREE 40 MG: 5 INJECTION INTRAVENOUS at 08:04

## 2023-01-01 RX ADMIN — ENOXAPARIN SODIUM 60 MG: 100 INJECTION SUBCUTANEOUS at 08:27

## 2023-01-01 RX ADMIN — PROPOFOL 30 MCG/KG/MIN: 10 INJECTION, EMULSION INTRAVENOUS at 01:41

## 2023-01-01 RX ADMIN — PROPOFOL 30 MCG/KG/MIN: 10 INJECTION, EMULSION INTRAVENOUS at 21:48

## 2023-01-01 RX ADMIN — Medication 150 MCG/HR: at 01:32

## 2023-01-01 RX ADMIN — IPRATROPIUM BROMIDE AND ALBUTEROL SULFATE 1 DOSE: 2.5; .5 SOLUTION RESPIRATORY (INHALATION) at 21:09

## 2023-01-01 RX ADMIN — IPRATROPIUM BROMIDE AND ALBUTEROL SULFATE 1 DOSE: 2.5; .5 SOLUTION RESPIRATORY (INHALATION) at 15:49

## 2023-01-01 RX ADMIN — MIDAZOLAM 4 MG: 1 INJECTION INTRAMUSCULAR; INTRAVENOUS at 20:25

## 2023-01-01 RX ADMIN — MIDAZOLAM HYDROCHLORIDE 2 MG: 2 INJECTION, SOLUTION INTRAMUSCULAR; INTRAVENOUS at 23:58

## 2023-01-01 RX ADMIN — ENOXAPARIN SODIUM 60 MG: 100 INJECTION SUBCUTANEOUS at 08:14

## 2023-01-01 RX ADMIN — PIPERACILLIN AND TAZOBACTAM 3375 MG: 3; .375 INJECTION, POWDER, FOR SOLUTION INTRAVENOUS; PARENTERAL at 05:54

## 2023-01-01 RX ADMIN — OXYCODONE 10 MG: 5 TABLET ORAL at 16:09

## 2023-01-01 RX ADMIN — Medication 10 MG: at 23:04

## 2023-01-01 RX ADMIN — FUROSEMIDE 40 MG: 10 INJECTION, SOLUTION INTRAMUSCULAR; INTRAVENOUS at 07:46

## 2023-01-01 RX ADMIN — FUROSEMIDE 40 MG: 10 INJECTION, SOLUTION INTRAMUSCULAR; INTRAVENOUS at 17:12

## 2023-01-01 RX ADMIN — PIPERACILLIN AND TAZOBACTAM 3375 MG: 3; .375 INJECTION, POWDER, FOR SOLUTION INTRAVENOUS; PARENTERAL at 23:48

## 2023-01-01 RX ADMIN — ENOXAPARIN SODIUM 60 MG: 100 INJECTION SUBCUTANEOUS at 07:52

## 2023-01-01 RX ADMIN — IPRATROPIUM BROMIDE AND ALBUTEROL SULFATE 1 DOSE: 2.5; .5 SOLUTION RESPIRATORY (INHALATION) at 11:36

## 2023-01-01 RX ADMIN — ENOXAPARIN SODIUM 60 MG: 100 INJECTION SUBCUTANEOUS at 21:16

## 2023-01-01 RX ADMIN — INSULIN LISPRO 12 UNITS: 100 INJECTION, SOLUTION INTRAVENOUS; SUBCUTANEOUS at 00:29

## 2023-01-01 RX ADMIN — PROPOFOL 20 MCG/KG/MIN: 10 INJECTION, EMULSION INTRAVENOUS at 02:50

## 2023-01-01 RX ADMIN — SODIUM CHLORIDE, PRESERVATIVE FREE 40 MG: 5 INJECTION INTRAVENOUS at 08:10

## 2023-01-01 RX ADMIN — ENOXAPARIN SODIUM 60 MG: 100 INJECTION SUBCUTANEOUS at 21:05

## 2023-01-01 RX ADMIN — HYDRALAZINE HYDROCHLORIDE 10 MG: 20 INJECTION INTRAMUSCULAR; INTRAVENOUS at 15:43

## 2023-01-01 RX ADMIN — FUROSEMIDE 40 MG: 10 INJECTION, SOLUTION INTRAMUSCULAR; INTRAVENOUS at 07:58

## 2023-01-01 RX ADMIN — IPRATROPIUM BROMIDE AND ALBUTEROL SULFATE 1 DOSE: 2.5; .5 SOLUTION RESPIRATORY (INHALATION) at 07:45

## 2023-01-01 RX ADMIN — OXYCODONE 10 MG: 5 TABLET ORAL at 09:01

## 2023-01-01 RX ADMIN — INSULIN HUMAN 10 UNITS: 100 INJECTION, SOLUTION PARENTERAL at 00:41

## 2023-01-01 RX ADMIN — PIPERACILLIN AND TAZOBACTAM 3375 MG: 3; .375 INJECTION, POWDER, FOR SOLUTION INTRAVENOUS; PARENTERAL at 13:07

## 2023-01-01 RX ADMIN — PIPERACILLIN AND TAZOBACTAM 3375 MG: 3; .375 INJECTION, POWDER, FOR SOLUTION INTRAVENOUS; PARENTERAL at 15:53

## 2023-01-01 RX ADMIN — IPRATROPIUM BROMIDE AND ALBUTEROL SULFATE 1 DOSE: 2.5; .5 SOLUTION RESPIRATORY (INHALATION) at 12:00

## 2023-01-01 RX ADMIN — INSULIN LISPRO 4 UNITS: 100 INJECTION, SOLUTION INTRAVENOUS; SUBCUTANEOUS at 12:25

## 2023-01-01 RX ADMIN — SODIUM CHLORIDE: 9 INJECTION, SOLUTION INTRAVENOUS at 21:10

## 2023-01-01 RX ADMIN — SODIUM CHLORIDE: 9 INJECTION, SOLUTION INTRAVENOUS at 09:49

## 2023-01-01 RX ADMIN — SODIUM CHLORIDE: 9 INJECTION, SOLUTION INTRAVENOUS at 03:07

## 2023-01-01 RX ADMIN — SENNOSIDES AND DOCUSATE SODIUM 1 TABLET: 50; 8.6 TABLET ORAL at 21:31

## 2023-01-01 RX ADMIN — SENNOSIDES AND DOCUSATE SODIUM 1 TABLET: 50; 8.6 TABLET ORAL at 08:11

## 2023-01-01 RX ADMIN — FUROSEMIDE 40 MG: 10 INJECTION, SOLUTION INTRAMUSCULAR; INTRAVENOUS at 18:05

## 2023-01-01 RX ADMIN — FUROSEMIDE 40 MG: 10 INJECTION, SOLUTION INTRAMUSCULAR; INTRAVENOUS at 17:25

## 2023-01-01 RX ADMIN — PROPOFOL 25 MCG/KG/MIN: 10 INJECTION, EMULSION INTRAVENOUS at 04:11

## 2023-01-01 RX ADMIN — CALCIUM GLUCONATE 2000 MG: 20 INJECTION, SOLUTION INTRAVENOUS at 09:04

## 2023-01-01 RX ADMIN — INSULIN GLARGINE 30 UNITS: 100 INJECTION, SOLUTION SUBCUTANEOUS at 08:57

## 2023-01-01 RX ADMIN — IPRATROPIUM BROMIDE AND ALBUTEROL SULFATE 1 DOSE: 2.5; .5 SOLUTION RESPIRATORY (INHALATION) at 12:31

## 2023-01-01 RX ADMIN — ENOXAPARIN SODIUM 60 MG: 100 INJECTION SUBCUTANEOUS at 08:41

## 2023-01-01 RX ADMIN — SODIUM CHLORIDE, PRESERVATIVE FREE 40 MG: 5 INJECTION INTRAVENOUS at 19:52

## 2023-01-01 RX ADMIN — Medication 175 MCG/HR: at 17:11

## 2023-01-01 RX ADMIN — INSULIN GLARGINE 30 UNITS: 100 INJECTION, SOLUTION SUBCUTANEOUS at 08:41

## 2023-01-01 RX ADMIN — INSULIN LISPRO 4 UNITS: 100 INJECTION, SOLUTION INTRAVENOUS; SUBCUTANEOUS at 04:50

## 2023-01-01 RX ADMIN — ENOXAPARIN SODIUM 60 MG: 100 INJECTION SUBCUTANEOUS at 09:07

## 2023-01-01 RX ADMIN — REMDESIVIR 200 MG: 100 INJECTION, POWDER, LYOPHILIZED, FOR SOLUTION INTRAVENOUS at 14:40

## 2023-01-01 RX ADMIN — SODIUM CHLORIDE: 9 INJECTION, SOLUTION INTRAVENOUS at 06:24

## 2023-01-01 RX ADMIN — ENOXAPARIN SODIUM 60 MG: 100 INJECTION SUBCUTANEOUS at 20:44

## 2023-01-01 RX ADMIN — ACETAMINOPHEN 650 MG: 325 TABLET ORAL at 12:28

## 2023-01-01 RX ADMIN — MORPHINE SULFATE 2 MG: 2 INJECTION, SOLUTION INTRAMUSCULAR; INTRAVENOUS at 17:34

## 2023-01-01 RX ADMIN — INSULIN LISPRO 8 UNITS: 100 INJECTION, SOLUTION INTRAVENOUS; SUBCUTANEOUS at 15:45

## 2023-01-01 RX ADMIN — OXYCODONE 10 MG: 5 TABLET ORAL at 02:16

## 2023-01-01 RX ADMIN — Medication 7 MG/HR: at 05:58

## 2023-01-01 RX ADMIN — ENOXAPARIN SODIUM 60 MG: 100 INJECTION SUBCUTANEOUS at 08:11

## 2023-01-01 RX ADMIN — SODIUM CHLORIDE, PRESERVATIVE FREE 40 MG: 5 INJECTION INTRAVENOUS at 08:54

## 2023-01-01 RX ADMIN — SODIUM CHLORIDE, PRESERVATIVE FREE 40 MG: 5 INJECTION INTRAVENOUS at 20:10

## 2023-01-01 RX ADMIN — SODIUM CHLORIDE, PRESERVATIVE FREE 10 ML: 5 INJECTION INTRAVENOUS at 20:10

## 2023-01-01 RX ADMIN — INSULIN LISPRO 6 UNITS: 100 INJECTION, SOLUTION INTRAVENOUS; SUBCUTANEOUS at 22:47

## 2023-01-01 RX ADMIN — IOPAMIDOL 85 ML: 755 INJECTION, SOLUTION INTRAVENOUS at 02:05

## 2023-01-01 RX ADMIN — PROPOFOL 30 MCG/KG/MIN: 10 INJECTION, EMULSION INTRAVENOUS at 13:37

## 2023-01-01 RX ADMIN — PROPOFOL 20 MCG/KG/MIN: 10 INJECTION, EMULSION INTRAVENOUS at 14:48

## 2023-01-01 RX ADMIN — PROPOFOL 20 MCG/KG/MIN: 10 INJECTION, EMULSION INTRAVENOUS at 08:23

## 2023-01-01 RX ADMIN — Medication 10 MG: at 04:47

## 2023-01-01 RX ADMIN — PIPERACILLIN AND TAZOBACTAM 3375 MG: 3; .375 INJECTION, POWDER, FOR SOLUTION INTRAVENOUS; PARENTERAL at 23:16

## 2023-01-01 RX ADMIN — POTASSIUM CHLORIDE 10 MEQ: 7.46 INJECTION, SOLUTION INTRAVENOUS at 11:00

## 2023-01-01 RX ADMIN — Medication 5 MG/HR: at 13:13

## 2023-01-01 RX ADMIN — INSULIN LISPRO 8 UNITS: 100 INJECTION, SOLUTION INTRAVENOUS; SUBCUTANEOUS at 17:13

## 2023-01-01 RX ADMIN — Medication 150 MCG/HR: at 22:07

## 2023-01-01 RX ADMIN — INSULIN GLARGINE 25 UNITS: 100 INJECTION, SOLUTION SUBCUTANEOUS at 10:06

## 2023-01-01 RX ADMIN — PROPOFOL 20 MCG/KG/MIN: 10 INJECTION, EMULSION INTRAVENOUS at 11:51

## 2023-01-01 RX ADMIN — ACETAMINOPHEN 650 MG: 325 TABLET ORAL at 18:04

## 2023-01-01 RX ADMIN — Medication 100 MCG/HR: at 04:21

## 2023-01-01 RX ADMIN — INSULIN LISPRO 4 UNITS: 100 INJECTION, SOLUTION INTRAVENOUS; SUBCUTANEOUS at 08:47

## 2023-01-01 RX ADMIN — IPRATROPIUM BROMIDE AND ALBUTEROL SULFATE 1 DOSE: 2.5; .5 SOLUTION RESPIRATORY (INHALATION) at 08:00

## 2023-01-01 RX ADMIN — PROPOFOL 30 MCG/KG/MIN: 10 INJECTION, EMULSION INTRAVENOUS at 18:07

## 2023-01-01 RX ADMIN — SODIUM CHLORIDE, PRESERVATIVE FREE 10 ML: 5 INJECTION INTRAVENOUS at 10:50

## 2023-01-01 RX ADMIN — PROPOFOL 25 MCG/KG/MIN: 10 INJECTION, EMULSION INTRAVENOUS at 22:07

## 2023-01-01 RX ADMIN — PIPERACILLIN AND TAZOBACTAM 3375 MG: 3; .375 INJECTION, POWDER, FOR SOLUTION INTRAVENOUS; PARENTERAL at 13:24

## 2023-01-01 RX ADMIN — Medication 200 MCG/HR: at 11:58

## 2023-01-01 RX ADMIN — ENOXAPARIN SODIUM 60 MG: 100 INJECTION SUBCUTANEOUS at 20:39

## 2023-01-01 RX ADMIN — IPRATROPIUM BROMIDE AND ALBUTEROL SULFATE 1 DOSE: 2.5; .5 SOLUTION RESPIRATORY (INHALATION) at 12:07

## 2023-01-01 RX ADMIN — Medication 150 MCG/HR: at 17:24

## 2023-01-01 RX ADMIN — SODIUM CHLORIDE, PRESERVATIVE FREE 40 MG: 5 INJECTION INTRAVENOUS at 07:46

## 2023-01-01 ASSESSMENT — PULMONARY FUNCTION TESTS
PIF_VALUE: 34
PIF_VALUE: 40
PIF_VALUE: 32
PIF_VALUE: 40
PIF_VALUE: 44
PIF_VALUE: 30
PIF_VALUE: 35
PIF_VALUE: 32
PIF_VALUE: 35
PIF_VALUE: 31
PIF_VALUE: 31
PIF_VALUE: 37
PIF_VALUE: 30
PIF_VALUE: 32
PIF_VALUE: 32
PIF_VALUE: 36
PIF_VALUE: 33
PIF_VALUE: 29
PIF_VALUE: 34
PIF_VALUE: 35
PIF_VALUE: 37
PIF_VALUE: 39
PIF_VALUE: 37
PIF_VALUE: 32
PIF_VALUE: 33
PIF_VALUE: 26
PIF_VALUE: 41
PIF_VALUE: 51
PIF_VALUE: 38
PIF_VALUE: 30
PIF_VALUE: 30
PIF_VALUE: 35
PIF_VALUE: 33
PIF_VALUE: 29
PIF_VALUE: 28
PIF_VALUE: 31
PIF_VALUE: 29
PIF_VALUE: 40
PIF_VALUE: 27
PIF_VALUE: 30
PIF_VALUE: 38
PIF_VALUE: 37
PIF_VALUE: 36
PIF_VALUE: 26
PIF_VALUE: 34
PIF_VALUE: 31
PIF_VALUE: 35
PIF_VALUE: 28
PIF_VALUE: 33
PIF_VALUE: 44
PIF_VALUE: 38
PIF_VALUE: 35
PIF_VALUE: 42
PIF_VALUE: 35
PIF_VALUE: 40
PIF_VALUE: 28
PIF_VALUE: 31
PIF_VALUE: 33
PIF_VALUE: 36
PIF_VALUE: 37
PIF_VALUE: 29
PIF_VALUE: 35
PIF_VALUE: 44
PIF_VALUE: 29
PIF_VALUE: 37
PIF_VALUE: 30
PIF_VALUE: 35
PIF_VALUE: 26
PIF_VALUE: 30
PIF_VALUE: 36
PIF_VALUE: 32
PIF_VALUE: 36
PIF_VALUE: 33
PIF_VALUE: 26
PIF_VALUE: 35
PIF_VALUE: 38
PIF_VALUE: 41
PIF_VALUE: 27
PIF_VALUE: 37
PIF_VALUE: 30
PIF_VALUE: 29
PIF_VALUE: 27
PIF_VALUE: 29
PIF_VALUE: 39
PIF_VALUE: 34
PIF_VALUE: 40
PIF_VALUE: 27
PIF_VALUE: 32
PIF_VALUE: 33
PIF_VALUE: 41
PIF_VALUE: 37
PIF_VALUE: 38
PIF_VALUE: 42
PIF_VALUE: 28
PIF_VALUE: 29
PIF_VALUE: 34
PIF_VALUE: 38
PIF_VALUE: 34
PIF_VALUE: 33
PIF_VALUE: 38
PIF_VALUE: 33
PIF_VALUE: 31
PIF_VALUE: 32
PIF_VALUE: 24
PIF_VALUE: 40
PIF_VALUE: 32
PIF_VALUE: 42
PIF_VALUE: 29
PIF_VALUE: 29
PIF_VALUE: 38
PIF_VALUE: 37
PIF_VALUE: 27
PIF_VALUE: 31
PIF_VALUE: 34
PIF_VALUE: 30
PIF_VALUE: 35
PIF_VALUE: 31
PIF_VALUE: 33
PIF_VALUE: 28
PIF_VALUE: 32
PIF_VALUE: 42
PIF_VALUE: 38
PIF_VALUE: 35
PIF_VALUE: 38
PIF_VALUE: 27
PIF_VALUE: 27
PIF_VALUE: 38
PIF_VALUE: 38
PIF_VALUE: 34
PIF_VALUE: 30
PIF_VALUE: 39
PIF_VALUE: 26
PIF_VALUE: 42
PIF_VALUE: 31
PIF_VALUE: 41
PIF_VALUE: 34
PIF_VALUE: 43
PIF_VALUE: 31
PIF_VALUE: 33
PIF_VALUE: 30
PIF_VALUE: 33
PIF_VALUE: 30
PIF_VALUE: 27
PIF_VALUE: 32
PIF_VALUE: 30
PIF_VALUE: 40
PIF_VALUE: 40

## 2023-01-01 ASSESSMENT — LIFESTYLE VARIABLES
HOW MANY STANDARD DRINKS CONTAINING ALCOHOL DO YOU HAVE ON A TYPICAL DAY: 1 OR 2
HOW OFTEN DO YOU HAVE A DRINK CONTAINING ALCOHOL: MONTHLY OR LESS

## 2023-01-01 ASSESSMENT — PAIN - FUNCTIONAL ASSESSMENT: PAIN_FUNCTIONAL_ASSESSMENT: CRITICAL CARE PAIN OBSERVATION TOOL (CPOT)

## 2023-12-18 PROBLEM — J96.01 ACUTE HYPOXIC RESPIRATORY FAILURE (HCC): Status: ACTIVE | Noted: 2023-01-01

## 2023-12-18 PROBLEM — G92.8 TOXIC METABOLIC ENCEPHALOPATHY: Status: ACTIVE | Noted: 2023-01-01

## 2023-12-18 PROBLEM — I89.0 ELEPHANTIASIS: Status: ACTIVE | Noted: 2023-01-01

## 2023-12-18 PROBLEM — J96.21 ACUTE ON CHRONIC RESPIRATORY FAILURE WITH HYPOXIA (HCC): Status: ACTIVE | Noted: 2023-01-01

## 2023-12-18 PROBLEM — J96.02 ACUTE RESPIRATORY FAILURE WITH HYPOXIA AND HYPERCAPNIA (HCC): Status: ACTIVE | Noted: 2023-01-01

## 2023-12-18 PROBLEM — Z28.310 UNVACCINATED FOR COVID-19: Status: ACTIVE | Noted: 2023-01-01

## 2023-12-18 PROBLEM — U07.1 COVID-19: Status: ACTIVE | Noted: 2023-01-01

## 2023-12-18 PROBLEM — E86.1 HYPOTENSION DUE TO HYPOVOLEMIA: Status: ACTIVE | Noted: 2023-01-01

## 2023-12-18 PROBLEM — R56.9 OBSERVED SEIZURE-LIKE ACTIVITY (HCC): Status: ACTIVE | Noted: 2023-01-01

## 2023-12-18 PROBLEM — I95.89 HYPOTENSION DUE TO HYPOVOLEMIA: Status: ACTIVE | Noted: 2023-01-01

## 2023-12-18 PROBLEM — G25.3: Status: ACTIVE | Noted: 2023-01-01

## 2023-12-18 PROBLEM — E66.01 MORBID OBESITY (HCC): Status: ACTIVE | Noted: 2023-01-01

## 2023-12-18 PROBLEM — G93.6: Status: ACTIVE | Noted: 2023-01-01

## 2023-12-18 PROBLEM — J96.22 ACUTE ON CHRONIC RESPIRATORY FAILURE WITH HYPOXIA AND HYPERCAPNIA (HCC): Status: ACTIVE | Noted: 2023-01-01

## 2023-12-18 PROBLEM — J96.21 ACUTE ON CHRONIC RESPIRATORY FAILURE WITH HYPOXIA AND HYPERCAPNIA (HCC): Status: ACTIVE | Noted: 2023-01-01

## 2023-12-18 PROBLEM — E66.2 OBESITY HYPOVENTILATION SYNDROME (HCC): Status: ACTIVE | Noted: 2023-01-01

## 2023-12-18 PROBLEM — J96.01 ACUTE RESPIRATORY FAILURE WITH HYPOXIA AND HYPERCAPNIA (HCC): Status: ACTIVE | Noted: 2023-01-01

## 2023-12-18 NOTE — PROGRESS NOTES
Patient admitted on Mechanical Ventilator Protocol. Patients height measured at 75\" for an IBW 84.5kg    Patient placed on the ventilator on settings as charted on flowsheeet. Ventilator Bronchodilator assessment    Breath sounds: Diminished  Inspiratory Pressure: 34  Plateau Pressure: 29    Patient assessed at level 3          [x]    Bronchodilator Assessment    BRONCHODILATOR ASSESSMENT SCORE  Score 0 (Home) 1 2 3 4   Breath Sounds   []  Chronic Ventilator: Patient at baseline []  Mild Wheezes/ Clear []  Intermittent wheezes with good air entry [x]  Bilateral/unilateral wheezing with diminished air entry []  Insp/Exp wheeze and/or poor aeration   Ventilator Pressures   []  Chronic Ventilator []  Insp. Pressure less than 25 cm H20 []  Insp. Pressure less than 25 cm H20 [x]  Insp. Pressure exceeds 25 cm H20 []  Insp.  Pressure exceeds 30 cm H20   Plateau Pressure []  NA   []  Plateau Pressure less than 4  []  Plateau Pressure less than or equal to 5 [x]  Plateau Pressure greater than or equal to 6 []  Plateau Pressure greater than or equal to 8       [employer], P  4:41 AM

## 2023-12-18 NOTE — ED PROVIDER NOTES
Take 1 tablet by mouth daily      white petrolatum-corn starch-lanolin (TRIPLE PASTE) 12.8 % ointment Apply topically 2 times daily      naloxone 4 MG/0.1ML LIQD nasal spray 4 mg as needed      doxycycline hyclate (VIBRAMYCIN) 100 MG capsule       atenolol (TENORMIN) 100 MG tablet Take 100 mg by mouth daily      furosemide (LASIX) 80 MG tablet Take 80 mg by mouth daily      cephALEXin (KEFLEX) 500 MG capsule Take 1 capsule by mouth 4 times daily 28 capsule 0    clotrimazole (LOTRIMIN) 1 % cream APPLY TOPICALLY TWICE DAILY 60 g 1    lisinopril (PRINIVIL;ZESTRIL) 5 MG tablet Take 1 tablet by mouth daily 30 tablet 3    fenofibrate (TRIGLIDE) 160 MG tablet TAKE 1 TABLET BY MOUTH EVERY DAY 90 tablet 1    albuterol (PROVENTIL) (2.5 MG/3ML) 0.083% nebulizer solution Take 3 mLs by nebulization every 4 hours as needed for Wheezing or Shortness of Breath 120 each 11    aspirin 81 MG EC tablet Take 1 tablet by mouth daily 30 tablet 3    metoprolol tartrate (LOPRESSOR) 50 MG tablet Take 1.5 tablets by mouth 2 times daily 90 tablet 5    albuterol sulfate HFA (VENTOLIN HFA) 108 (90 Base) MCG/ACT inhaler Inhale 2 puffs into the lungs every 6 hours as needed for Wheezing 1 Inhaler 3    furosemide (LASIX) 20 MG tablet Take 1 tablet by mouth every other day 15 tablet 5    potassium chloride (KLOR-CON M) 10 MEQ extended release tablet Take 1 tablet by mouth every other day 15 tablet 5    triamterene-hydroCHLOROthiazide (MAXZIDE-25) 37.5-25 MG per tablet TAKE 1 TABLET BY MOUTH EVERY DAY 90 tablet 1    buprenorphine-naloxone (SUBOXONE) 8-2 MG SUBL SL tablet DISSOLVE 1 (ONE) UNDER THE TONGUE EVERY 12 HOURS         ALLERGIES    No Known Allergies    FAMILY HISTORY    Family History   Problem Relation Age of Onset    Cancer Mother        SOCIAL HISTORY    Social History     Socioeconomic History    Marital status: Legally    Tobacco Use    Smoking status: Former     Current packs/day: 0.00     Types: Cigarettes     Quit date: Altered mental status, unspecified altered mental status type    3. COVID-19               (Please note that portions of this note were completed with a voice recognition program.  Efforts were made to edit the dictations but occasionally words are mis-transcribed.)          Lyle Hi, DO  12/17/23 2104       Lyle Hi, DO  12/17/23 2115       Lyle Hi, DO  12/17/23 2115

## 2023-12-18 NOTE — ED PROVIDER NOTES
[address] [place] [address] ED  Emergency Department Encounter  Emergency Medicine Resident     Pt Marcelinatammy Delmer Gonzalez  MRN: [id number]  [facility] Juan 1965  Date of evaluation: 12/18/23  PCP:  Barrett Cody MD  Note Started: 12:37 AM EST      CHIEF COMPLAINT       Chief Complaint   Patient presents with    Altered Mental Status       HISTORY OF PRESENT ILLNESS  (Location/Symptom, Timing/Onset, Context/Setting, Quality, Duration, Modifying Factors, Severity.)      Jasmin Goldstein is a 63 y.o. male who presents as a transfer from [place] ED for suspected overdose and unresponsiveness with acute respiratory failure. Patient was found down at his extended care facility, unknown down time. He has a history of substance use. [facility] was given a total of 8 mg narcan without response, and was intubated for airway protection. He was found to be Covid positive. UDS was positive for opiates. Chest Xray at [facility] showing pulmonary edema. He arrives to our facility intubated and sedated on propofol. EMS reports he began having some upper extremity tremors that appeared to be seizure like activity just prior to arrival, and he was given 2 mg versed. PAST MEDICAL / SURGICAL / SOCIAL / FAMILY HISTORY      has a past medical history of Addiction to drug [facility]), CAD (coronary artery disease), COPD (chronic obstructive pulmonary disease) ([address]), Hypertension, Kidney failure, acute ([address]), MRSA (methicillin resistant Staphylococcus aureus), and Patient requiring acute dialysis ([address] [facility]). has a past surgical history that includes Lung surgery; knee surgery; joint replacement (knee replacement, removed, MRSA); Cardiac surgery; Ankle surgery; and above knee amputation (Left).       Social History     Socioeconomic History    Marital status: Legally      Spouse name: Not on file    Number of children: Not on file    Years of education: Not on file

## 2023-12-18 NOTE — ED NOTES
2mg versed given IVP by Rosario Bradford RN per Dr. Shannan Peacock.      Angélica Dudley RN  12/18/23 0000

## 2023-12-18 NOTE — PROGRESS NOTES
Comprehensive Nutrition Assessment    Type and Reason for Visit:  Initial (Vent Check)    Nutrition Recommendations/Plan:   Continue NPO. Monitor plans for nutrition and plan of care. If nutrition support requested, suggest TF of Peptide Based formula goal 80 mL/hr. Malnutrition Assessment:  Malnutrition Status:  Insufficient data (12/18/23 7656)    Context:  Acute Illness     Findings of the 6 clinical characteristics of malnutrition:  Energy Intake:  Unable to assess  Weight Loss:  No significant weight loss     Body Fat Loss:  Unable to assess   Muscle Mass Loss:  Unable to assess  Fluid Accumulation:  Moderate to Severe Generalized, Extremities   Strength:  Not Performed    Nutrition Assessment:    Admitted s/p unresponsiveness. Currently intubated and sedated. PMH: CAD, COPD, HTN, h/o left AKA; h/o drug abuse. +Covid. Will monitor plans for nutrition and plan of care. Weight fluctuations noted per chart review. Labs reviewed: Glucose 157-190 mg/dL. Meds include: Decadron. Nutrition Related Findings:    labs/meds reviewed. Wound Type: None       Current Nutrition Intake & Therapies:    Average Meal Intake: NPO  Average Supplements Intake: NPO  Diet NPO  Additional Calorie Sources:  None    Anthropometric Measures:  Height: 190.5 cm (6' 3\")  Ideal Body Weight (IBW): 196 lbs (89 kg)    Admission Body Weight: 224.1 kg (494 lb 0.8 oz)  Current Body Weight: 215.9 kg (475 lb 15.6 oz), 242.8 % IBW.  Weight Source: Bed Scale  Current BMI (kg/m2): 59.5        Weight Adjustment For: Amputation  Total Adjusted Percentage (Calculated): 10.1  Adjusted Ideal Body Weight (lbs) (Calculated): 176.2 lbs  Adjusted Ideal Body Weight (kg) (Calculated): 80.09 kg  Adjusted % Ideal Body Weight (Calculated): 270.1  Adjusted BMI (kg/m2) (Calculated): 65.5  BMI Categories: Obese Class 3 (BMI 40.0 or greater)    Estimated Daily Nutrient Needs:  Energy Requirements Based On: Formula  Weight Used for Energy Requirements:

## 2023-12-18 NOTE — CARE COORDINATION
Case Management Assessment  Initial Evaluation    Date/Time of Evaluation: 12/18/2023 5:22 PM  Assessment Completed by: Adrián Delacruz RN    If patient is discharged prior to next notation, then this note serves as note for discharge by case management. Patient Name: David Arguelles                   YOB: 1965  Diagnosis: Acute on chronic respiratory failure with hypoxia ([address]) [J96.21]  Acute respiratory failure with hypoxia and hypercapnia ([address]) [J96.01, J96.02]  COVID-19 [U07.1]                   Date / Time: 12/17/2023 11:55 PM    Patient Admission Status: Inpatient   Readmission Risk (Low < 19, Mod (19-27), High > 27): Readmission Risk Score: 15.7    Current PCP: Tammy Dotson MD  PCP verified by CM? (P) Yes (sees physician at [facility])    Chart Reviewed: Yes      History Provided by: (P) Child/Family (daughter, Marilee Banegas)  Patient Orientation: (P) Sedated    Patient Cognition: (P) Other (see comment) (Intubated/ Sedated)    Hospitalization in the last 30 days (Readmission):  No    If yes, Readmission Assessment in  Navigator will be completed.     Advance Directives:      Code Status: Full Code   Patient's Primary Decision Maker is: Legal Next of Kin    Primary Decision MakerLady Murcia - Child - [phone]    Secondary Decision Maker: Marilee Banegas Child - [phone]    Discharge Planning:    Patient lives with: Alone Type of Home: (P) Long-Term Care  Primary Care Giver:    Patient Support Systems include: (P) Children, Other (Comment) ([facility])   Current Financial resources: (P) Medicare, Medicaid  Current community resources: (P) ECF/Home Care  Current services prior to admission: (P) Extended Care Facility, Transportation, Oxygen Therapy, Durable Medical Equipment            Current DME: (P) Wheelchair, Oxygen Therapy (Comment) (Motorized WC, O2 provided by facility)            Type of Home Care services:  Skilled Therapy    ADLS  Prior functional level: (P) Assistance with the following:, Mobility, Bathing, Dressing  Current functional level: (P) Other (see comment) (Intubated/ Sedated)    PT AM-PAC:   /24  OT AM-PAC:   /24    Family can provide assistance at DC: (P) No  Would you like Case Management to discuss the discharge plan with any other family members/significant others, and if so, who?    Plans to Return to Present Housing: (P) Unknown at present  Other Identified Issues/Barriers to RETURNING to current housing: Respiratory needs  Potential Assistance needed at discharge: (P) Durable Medical Equipment            Potential DME:    Patient expects to discharge to: (P) Long-term care  Plan for transportation at discharge:      Financial    Payor: MEDICARE / Plan: MEDICARE PART A AND B / Product Type: *No Product type* /     Does insurance require precert for SNF: No    Potential assistance Purchasing Medications: (P) No  Meds-to-Beds request: Yes    No Pharmacies Listed    Notes:    Factors facilitating achievement of predicted outcomes: Caregiver support    Barriers to discharge: Medical complications    Additional Case Management Notes: I/S FiO2 70%, PEEP of 10, OG for TF, LTME, follows commands. Long term bed hold at [facility] in [place]- Call to verify pt is a long term bed hold ([facility] [phone])     The Plan for Transition of Care is related to the following treatment goals of Acute on chronic respiratory failure with hypoxia (HCC) [J96.21]  Acute respiratory failure with hypoxia and hypercapnia (HCC) [J96.01, J96.02]  COVID-19 [U07.1]    IF APPLICABLE: The Patient and/or patient representative Johnny and his family were provided with a choice of provider and agrees with the discharge plan. Freedom of choice list with basic dialogue that supports the patient's individualized plan of care/goals and shares the quality data associated with the providers was provided to: (P) Patient Representative

## 2023-12-18 NOTE — PLAN OF CARE
Problem: Respiratory - Adult  Goal: Achieves optimal ventilation and oxygenation  12/18/2023 0734 by Miriam Mirza RCP  Outcome: Progressing  12/18/2023 0523 by Bill Torres RN  Outcome: Progressing  12/18/2023 0446 by Mustapha Tijerina RCP  Outcome: Progressing

## 2023-12-18 NOTE — H&P
Critical Care - History and Physical Examination    Patient's name:  Jerri Howard  Medical Record Number: [id number]  Patient's account/billing number: [de-identified]  Patient's YOB: 1965  Age: 63 y.o. Date of Admission: 12/17/2023 11:55 PM  Reason of ICU admission:   Date of History and Physical Examination: 12/18/2023      Primary Care Physician: Kevin Oneil MD  Attending Physician: Dr. Juan Estevez Status: Prior    Chief complaint: Found down    Reason for ICU admission: Acute hypoxic respiratory failure      History Of Present Illness:   History was obtained from chart review. Jerri Howard is a 63 y.o. male who presents to the ED as a transfer from [place] after he was found to be unresponsive at the [facility], he does have a history of drug use and was given Narcan with no response, and had to be intubated for airway protection. EMS also reports that he had upper extremity tremors concerning for seizure activity. urine drug screen was positive for opiates, he also tested positive for COVID. Chest x-ray was concerning for moderate diffuse pulmonary edema. CT of the chest showed no evidence of pulmonary embolism, showed evidence of previous CABG, mild to moderate pulmonary vascular congestion. CT of the head showed showed no obvious intracranial hemorrhage. There is suspicion for diffuse cerebral edema. 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          Consult Services: INFECTIOUS DISEASES    Past Medical History:        Diagnosis Date    Addiction to drug [facility])     CAD (coronary artery disease)     COPD (chronic obstructive pulmonary disease) ([address])     Hypertension     Kidney failure, acute (HCC)     due to mrsa, reversed    MRSA (methicillin resistant enzymes:  Recent Labs     12/18/23  0035   CKTOTAL 116     BNP:No results for input(s): \"BNP\" in the last 72 hours.  Lipid profile: No results for input(s): \"CHOL\", \"TRIG\", \"HDL\", \"LDL\", \"LDLCALC\" in the last 72 hours.  Blood Gases: No results found for: \"PH\", \"PCO2\", \"PO2\", \"HCO3\", \"O2SAT\"  Thyroid functions:   Lab Results   Component Value Date/Time    TSH 0.37 12/18/2023 12:35 AM        ASSESSMENT:     Principal Problem:    Acute on chronic respiratory failure with hypoxia (HCC)  Resolved Problems:    * No resolved hospital problems. *      PLAN:     Neurologic:   Intubated and sedated  Urine drug screen positive for opioids  CT of the head shows no obvious intracranial hemorrhage, there is suspicion for diffuse cerebral edema  Neuro checks per protocol  Sedation and Analgesia:Propofol , Fentanyl, and Versed  Paralysis: No  Cardiovascular:  History of tricuspid valve replacement in 2009 secondary to bacterial endocarditis  Hypertension  Resume home meds as tolerated  Maintain MAP >65  Pulmonary:  Acute hypoxic respiratory failure  CT of the chest shows small to moderate left changes or infiltrate in the right pulmonary lower lobe, mild to moderate pulmonary vascular congestion  COPD  Continue breathing treatments with albuterol and DuoNebs  Maintain oxygen sats >92%  Continue pulmonary toilet  GI/Nutrition  Ulcer Prophylaxis: Not Indicated  Diet: N.p.o.  Renal/Fluid/Electrolyte  Cr Stable  Monitor electrolytes, replace PRN   Monitor intake/output  Avoid nephrotoxic medications  ID  COVID positive  History of MRSA  Concern for right leg cellulitis  Monitor for fever and leucocytosis  Antimicrobials: Vancomycin  Hematology:  Hemoglobin stable, 10.7  Transfuse if Hb drops below 7 or as clinically indicated.   Endocrine:   Monitor glucose levels    DVT Prophylaxis  EPC Cuffs  Lovenox     CONSULT SERVICES: INFECTIOUS DISEASES    DISPOSITION:  Monitor in ICU.     Prognosis: Guarded    Chaparrita Freire  PGY-2, Internal

## 2023-12-18 NOTE — PLAN OF CARE
Problem: Discharge Planning  Goal: Discharge to home or other facility with appropriate resources  Outcome: Progressing     Problem: Pain  Goal: Verbalizes/displays adequate comfort level or baseline comfort level  Outcome: Progressing     Problem: Respiratory - Adult  Goal: Achieves optimal ventilation and oxygenation  12/18/2023 0523 by Kamar Whitten RN  Outcome: Progressing  12/18/2023 0446 by Bernadine Perkins RCP  Outcome: Progressing     Problem: Skin/Tissue Integrity  Goal: Absence of new skin breakdown  Description: 1. Monitor for areas of redness and/or skin breakdown  2. Assess vascular access sites hourly  3. Every 4-6 hours minimum:  Change oxygen saturation probe site  4. Every 4-6 hours:  If on nasal continuous positive airway pressure, respiratory therapy assess nares and determine need for appliance change or resting period. Outcome: Progressing     Problem: Safety - Adult  Goal: Free from fall injury  Outcome: Progressing     Problem: ABCDS Injury Assessment  Goal: Absence of physical injury  Outcome: Progressing     Problem: Safety - Medical Restraint  Goal: Remains free of injury from restraints (Restraint for Interference with Medical Device)  Description: INTERVENTIONS:  1. Determine that other, less restrictive measures have been tried or would not be effective before applying the restraint  2. Evaluate the patient's condition at the time of restraint application  3. Inform patient/family regarding the reason for restraint  4.  Q2H: Monitor safety, psychosocial status, comfort, nutrition and hydration  Outcome: Not Progressing  Flowsheets (Taken 12/18/2023 0345 by Dawit Rivers RN)  Remains free of injury from restraints (restraint for interference with medical device):   Determine that other, less restrictive measures have been tried or would not be effective before applying the restraint   Evaluate the patient's condition at the time of restraint application   Inform

## 2023-12-18 NOTE — ED NOTES
62 YO ECF RESIDENT, AMS,HX OF SUBSTANCE ABUSE,  NO RESPONSE TO NALOXONE, INTUBATED, STABLE VSS, CT PENDING, GROUND XFER DUE TO SIZE. TOO BIG FOR NEISHA CT. COVID POSITIVE.     Accepted by Dr. Gail Foster RN  12/18/23 7131

## 2023-12-18 NOTE — PROGRESS NOTES
12/17/23 5054   ETT    Placement Date/Time: 12/17/23 1956   Present on Admission/Arrival: No  Placed By: In ED  Placement Verified By: Auscultation;Colorimetric ETCO2 device  Preoxygenation: Yes  Technique: Stylet  Airway Tube Size: 8 mm  Laryngoscope: GlideScope  Location:. ..    Secured At (S)  24 cm  (retracted 2cm per CXR)   Measured From (S)  Lips

## 2023-12-18 NOTE — ED NOTES
Patient to [address] [place] [address], [address]  12/18/23 2100

## 2023-12-18 NOTE — PLAN OF CARE
Problem: Safety - Medical Restraint  Goal: Remains free of injury from restraints (Restraint for Interference with Medical Device)  Description: INTERVENTIONS:  1. Determine that other, less restrictive measures have been tried or would not be effective before applying the restraint  2. Evaluate the patient's condition at the time of restraint application  3. Inform patient/family regarding the reason for restraint  4.  Q2H: Monitor safety, psychosocial status, comfort, nutrition and hydration  12/18/2023 1803 by Claudia Pérez RN  Outcome: Not Progressing  Flowsheets (Taken 12/18/2023 0600 by Bill Torres RN)  Remains free of injury from restraints (restraint for interference with medical device):   Determine that other, less restrictive measures have been tried or would not be effective before applying the restraint   Evaluate the patient's condition at the time of restraint application   Inform patient/family regarding the reason for restraint   Every 2 hours: Monitor safety, psychosocial status, comfort, nutrition and hydration  12/18/2023 0523 by Bill Torres RN  Outcome: Not Progressing  Flowsheets  Taken 12/18/2023 0430 by Bill Torres RN  Remains free of injury from restraints (restraint for interference with medical device):   Determine that other, less restrictive measures have been tried or would not be effective before applying the restraint   Evaluate the patient's condition at the time of restraint application   Inform patient/family regarding the reason for restraint   Every 2 hours: Monitor safety, psychosocial status, comfort, nutrition and hydration  Taken 12/18/2023 0345 by Kelton Pérez RN  Remains free of injury from restraints (restraint for interference with medical device):   Determine that other, less restrictive measures have been tried or would not be effective before applying the restraint   Evaluate the patient's condition at the time of restraint application symptoms are monitored and maintained or improved  Outcome: Progressing     Problem: Nutrition Deficit:  Goal: Optimize nutritional status  Outcome: Progressing

## 2023-12-18 NOTE — CONSULTS
12/18/23 12:43 AM   Result Value Ref Range    POC Glucose 151 (H) 74 - 100 mg/dL   Culture, Blood 1    Collection Time: 12/18/23 12:44 AM    Specimen: Blood   Result Value Ref Range    Specimen Description .BLOOD     Special Requests R AC 2ML     Culture NO GROWTH <24 HRS    Culture, Blood 1    Collection Time: 12/18/23 12:56 AM    Specimen: Blood   Result Value Ref Range    Specimen Description .BLOOD     Special Requests L UPPERARM 5ML     Culture NO GROWTH <24 HRS    Lactate, Sepsis    Collection Time: 12/18/23  2:37 AM   Result Value Ref Range    Lactic Acid, Sepsis, Whole Blood 1.3 0.5 - 1.9 mmol/L   MRSA DNA Probe, Nasal    Collection Time: 12/18/23  4:32 AM    Specimen: Nasal   Result Value Ref Range    Specimen Description .NASAL SWAB     MRSA, DNA, Nasal NEGATIVE NEGATIVE   Lipase    Collection Time: 12/18/23  5:13 AM   Result Value Ref Range    Lipase 17 13 - 60 U/L   CK    Collection Time: 12/18/23  5:13 AM   Result Value Ref Range    Total  (H) 39 - 308 U/L   Troponin    Collection Time: 12/18/23  5:13 AM   Result Value Ref Range    Troponin, High Sensitivity 31 (H) 0 - 22 ng/L   Brain natriuretic peptide    Collection Time: 12/18/23  5:13 AM   Result Value Ref Range    Pro- (H) <300 pg/mL   Protime-INR    Collection Time: 12/18/23  5:13 AM   Result Value Ref Range    Protime 14.7 11.7 - 14.9 sec    INR 1.2    Culture, Blood 1    Collection Time: 12/18/23  5:13 AM    Specimen: Blood   Result Value Ref Range    Specimen Description .BLOOD     Special Requests RIGHT HAND 1ML     Culture NO GROWTH <24 HRS    Culture, Blood 2    Collection Time: 12/18/23  5:13 AM    Specimen: Blood   Result Value Ref Range    Specimen Description .BLOOD     Special Requests LEFT HAND 1ML     Culture NO GROWTH <24 HRS    Procalcitonin    Collection Time: 12/18/23  5:13 AM   Result Value Ref Range    Procalcitonin 0.19 (H) <0.09 ng/mL   Basic Metabolic Panel w/ Reflex to MG    Collection Time: 12/18/23  5:13 AM  Result Value Ref Range    Sodium 139 135 - 144 mmol/L    Potassium 4.6 3.7 - 5.3 mmol/L    Chloride 96 (L) 98 - 107 mmol/L    CO2 35 (H) 20 - 31 mmol/L    Anion Gap 8 (L) 9 - 17 mmol/L    Glucose 157 (H) 70 - 99 mg/dL    BUN 23 (H) 6 - 20 mg/dL    Creatinine 1.0 0.7 - 1.2 mg/dL    Est, Glom Filt Rate >60 >60 mL/min/1.73m2    Calcium 8.2 (L) 8.6 - 10.4 mg/dL   Hepatic Function Panel    Collection Time: 12/18/23  5:13 AM   Result Value Ref Range    Albumin 2.6 (L) 3.5 - 5.2 g/dL    Alkaline Phosphatase 91 40 - 129 U/L    ALT 51 (H) 5 - 41 U/L    AST 58 (H) <40 U/L    Total Bilirubin 0.4 0.3 - 1.2 mg/dL    Bilirubin, Direct 0.3 (H) <0.3 mg/dL    Bilirubin, Indirect 0.1 0.0 - 1.0 mg/dL    Total Protein 6.9 6.4 - 8.3 g/dL    Albumin/Globulin Ratio 0.6 (L) 1.0 - 2.5   Arterial Blood Gas, POC    Collection Time: 12/18/23  5:20 AM   Result Value Ref Range    POC pH 7.388 7.350 - 7.450    POC pCO2 62.7 (H) 35.0 - 48.0 mm Hg    POC PO2 271.9 (H) 83.0 - 108.0 mm Hg    POC HCO3 37.8 (H) 21.0 - 28.0 mmol/L    Positive Base Excess, Art 10.6 (H) 0.0 - 3.0 mmol/L    POC O2 SAT 99.9 (H) 94.0 - 98.0 %    Sample Site Left Radial Artery     FIO2 100.0    ELECTROLYTES PLUS    Collection Time: 12/18/23  5:20 AM   Result Value Ref Range    POC Sodium 142 138 - 146 mmol/L    POC Potassium 4.4 3.5 - 4.5 mmol/L    POC Chloride 97 (L) 98 - 107 mmol/L    POC TCO2 37 (H) 22 - 30 mmol/L    POC Anion Gap 9 7 - 16 mmol/L   Hemoglobin and hematocrit, blood    Collection Time: 12/18/23  5:20 AM   Result Value Ref Range    POC Hemoglobin (calc) 12.1 (L) 13.5 - 17.5 g/dL    POC Hematocrit 35 (L) 41 - 53 %   Creatinine W/GFR Point of Care    Collection Time: 12/18/23  5:20 AM   Result Value Ref Range    POC Creatinine 1.1 0.51 - 1.19 mg/dL    eGFR, POC >60 mL/min/1.73m2   CALCIUM, IONIC (POC)    Collection Time: 12/18/23  5:20 AM   Result Value Ref Range    POC Ionized Calcium 1.14 (L) 1.15 - 1.33 mmol/L   POCT urea (BUN)    Collection Time: 12/18/23 hypercapnia and likely hypoxia prior to EMS interventions   Blood cultures negative 2/2 X24 hours   Unable to obtain MRI brain given body habitus  Repeat CT head without 12/19/2023  Will get patient on LTME given action induced bilateral upper extremity myoclonus  Wean IV Versed as able and transition to Precidex vs propofol if able for above concerns   -Continue aspirin 81mg   -continue Fenofibrate 160 mg daily once out of critical illness and stable per primary         DVT ppx-Lovenox 60mg SubCutaneous BID (weight based ppx)   PT/OT/ST  Came from SNF will need placement back at discharge vs LTAC pending his improvement and respiratory/medical needs     Consultations:   IP CONSULT TO CRITICAL CARE  IP CONSULT TO INFECTIOUS DISEASES  PHARMACY TO DOSE VANCOMYCIN  IP CONSULT TO NEUROLOGY  PHARMACY TO DOSE MEDICATION      Follow-up further recommendations after discussing the case with attending  The plan was discussed with the patient, patient's family and the medical staff.      Patient is admitted as inpatient status because of co-morbidities listed above, severity of signs and symptoms as outlined, requirement for current medical therapies and most importantly because of direct risk to patient if care not provided in a hospital setting.    Clint Mg MD  Attending Physician   12/18/2023  11:27 AM    Copy sent to Tabatha Diamond MD

## 2023-12-18 NOTE — ED NOTES
Patient presents to ED as [facility] transfer after being found unresponsive at the facility he stays at. EMS states chest xray shows pulmonary edema. Report says that the patient has hx of drug use and was given 4mg narcan with no response and then another 4 mg with no response. Patient was intubated at previous hospital with ET tube 24 at lip. Patient has hx of drug use. Patient currently has GCS of 3. Patient has barragan and OG tube prior to arrival. Patient is connected to full cardiac monitor.       Rufus Gaona RN  12/18/23 7939

## 2023-12-19 NOTE — PROCEDURES
LONG-TERM EEG-VIDEO MONITORING   CLINICAL NEUROPHYSIOLOGY LABORATORY  DEPARTMENT OF NEUROLOGY  [facility]    Patient: Johnny Dozier  Age: 58 y.o.  MRN: [id number]    Referring Physician: No ref. provider found  History: The patient is a 58 y.o. male who presented breakthrough seizure/encephalopathy. This long-term video-EEG monitoring study was performed to determine the nature of the patient's clinical events. The patient is on neuroactive medications.   Johnny Dozier   Current Facility-Administered Medications   Medication Dose Route Frequency Provider Last Rate Last Admin    propofol infusion  5-50 mcg/kg/min IntraVENous Continuous Lluvia Huerta MD   Stopped at 12/18/23 0512    midazolam (VERSED) 100mg/100mL in NS infusion  1-10 mg/hr IntraVENous Continuous Lluvia Huerta MD 6 mL/hr at 12/18/23 1839 6 mg/hr at 12/18/23 1839    fentaNYL 50 mcg/mL 50 mL infusion   mcg/hr IntraVENous Continuous Lluvia Huerta MD 2.5 mL/hr at 12/18/23 1839 125 mcg/hr at 12/18/23 1839    sodium chloride flush 0.9 % injection 5-40 mL  5-40 mL IntraVENous 2 times per day Chaparrita Freire MD   10 mL at 12/18/23 1945    sodium chloride flush 0.9 % injection 5-40 mL  5-40 mL IntraVENous PRN Chaparrita Freire MD        0.9 % sodium chloride infusion   IntraVENous PRN Chaparrita Freire MD 10 mL/hr at 12/18/23 1839 Rate Verify at 12/18/23 1839    potassium chloride 20 mEq/50 mL IVPB (Central Line)  20 mEq IntraVENous PRN Chaparrita Freire MD        Or    potassium chloride 10 mEq/100 mL IVPB (Peripheral Line)  10 mEq IntraVENous PRN Chaparrita Freire MD        magnesium sulfate 2000 mg in 50 mL IVPB premix  2,000 mg IntraVENous PRN Chaparrita Freire MD        enoxaparin (LOVENOX) injection 60 mg  60 mg SubCUTAneous BID Chaparrita Freire MD   60 mg at 12/18/23 1945    ondansetron (ZOFRAN-ODT) disintegrating tablet 4 mg  4 mg Oral Q8H PRN Chaparrita Freire MD        Or    ondansetron  this is a preliminary report and updated daily. The final report will have a summary of behavior and electrographic findings with clinical correlation. Detail Level: Zone

## 2023-12-19 NOTE — PROGRESS NOTES
[facility] Neurology   IN-PATIENT SERVICE   [facility]    Progress note             Date:   12/19/2023  Patient name:  Johnny Dozier  Date of admission:  12/17/2023 11:55 PM  MRN:   [id number]  Account:  [id number]  YOB: 1965  PCP:    Tabatha Guajardo MD  Room:   [address] [place] [address]  Code Status:    Full Code    Chief Complaint:   Neurology Consulted 12/18/23 early morning for concern of seizures patient admitted to MICU 12/18/23 for acute respiratory failure 2/2 COVID 19 Pneumonia   Interval hx:   The Patient was seen and examined at bedside, care discussed with his nurse at bedside and all questions/ concerns addressed with primary team and nursing.   Increasing respiratory support required for COVID pneumonia and hypoxia with increased O2 requirement and tight Vent support per MICU and pulm critical care. Feel strongly his Cerebral edema on arrival is most likely related to his significant Hypercarbia and other metabolic derangements however still cannot fully rule out hypoxic insult. Given that his neurologic exam is reassuring following commands and non focal yesterday by myself and today during nursing sedation wean we can hold on repeat CT head WO until more stable. Increased sedation for vent compliance and patient comfort today. Noted on Prop at 20mcg/Kg/min, Versed 7mg/hr and Fentanyl 150mcg/hr as compared to only Versed 5mg/hr on my exam yesterday hence worse exam today however per nursing was similar to mine yesterday during his sedation holiday mid morning     No acute events overnight otherwise       LTME started 12/18/23 12:07PM  Day 1 - 12/18/23, starting at 12:07 pm     Interictal EEG Samples:  The background activity consisted of 4-5 Hz of polymorphic delta and theta slowing of 25-30 uV. The background was continuous and symmetric. The background showed state change and reactivity. During behavioral sleep, sleep spindles were seen, symmetric over both hemispheres. The  morbidly obese  gentleman with respiratory failure intubated on vent and heavy sedation with IV prop, Fentanyl and Versed    HEENT  NC/ AT   NECK  Supple and no bruits heard   MENTAL STATUS:  Near comatose exam this afternoon however can refer to my exam yesterday when he was on much lighter sedation. Due to worsening respiratory status required higher sedation for vent compliance and ventilation and stabilizing patient comfort and vitals    CRANIAL NERVES: II     -      no response to threat in all fields however is heavily sedated at time of my exam as above   III,IV,VI -  small pupils symmetric bilateral 1.5 mm briskly reactive to light   V     -     Normal facial sensation  VII    -     Normal facial symmetry      MOTOR FUNCTION:  Limited exam as patient is intubated on Versed however upon repeated painful stimulus was able to show me thumbs up on the right upper extremity and 2 fingers on the left upper extremity.   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 distally   SENSORY FUNCTION:  No response to pain however is very heavily sedated previously yesterday did localize pain in all 4    CEREBELLAR FUNCTION:  Unable to follow commands at this time    REFLEX FUNCTION:  Symmetric, no perverted reflex, no Babinski sign   STATION and GAIT  Unable to assess at this time intubated on vent        Investigations:      Laboratory Testing:  Recent Results (from the past 24 hour(s))   POC Glucose Fingerstick    Collection Time: 12/18/23 11:18 AM   Result Value Ref Range    POC Glucose 190 (H) 75 - 110 mg/dL   Troponin    Collection Time: 12/18/23 11:56 AM   Result Value Ref Range    Troponin, High Sensitivity 28 (H) 0 - 22 ng/L   Arterial Blood Gas, POC    Collection Time: 12/18/23 12:49 PM   Result Value Ref Range    POC pH 7.417 7.350 - 7.450    POC pCO2 56.7 (H) 35.0 - 48.0 mm Hg    POC PO2 45.5 (LL) 83.0 - 108.0 CO2-17, O2 212, HCO3-40.2, -->supporting acute on chronic respiratory failure with hypercapnia and likely hypoxia prior to EMS interventions   Blood cultures negative 2/2 X24 hours   Unable to obtain MRI brain given body habitus    on LTME started 12:07 PM 12/8/23  Day 1-Summary: During this day of recording no events were recorded. The interictal EEG was abnormal due to diffuse polymorphic delta and and theta slowing suggesting moderate to severe encephalopathy. No abnormal epileptiform activity was seen. Monitoring was continued in order to record the patient's typical events. The EKG channel revealed no abnormalities.  Day 2- unchanged from yesterday     Question of diffuse Cerebral edema on CT head WO on arrival  -CT head WO 12/17/23--> suspicion for diffuse Cerebral edema with compressed ventricular system   -Repeat CT head without 12/19/2023--ok to hold until improved respiratory status and stable.   -most likely this is metabolic related specifically Hypercarbic Cerebral edema however cannot fully rule out hypoxic insult   -Continue aspirin 81mg   -continue Fenofibrate 160 mg daily once out of critical illness and stable per primary            DVT ppx-Lovenox 60mg SubCutaneous BID (weight based ppx)   PT/OT/ST  Came from SNF will need placement back at discharge vs LTAC pending his improvement and respiratory/medical needs           Follow-up further recommendations after discussing the case with attending  The plan was discussed with the patient, patient's family and the medical staff.   Consultations:   IP CONSULT TO CRITICAL CARE  IP CONSULT TO INFECTIOUS DISEASES  IP CONSULT TO NEUROLOGY  PHARMACY TO DOSE MEDICATION    Patient is admitted as inpatient status because of co-morbidities listed above, severity of signs and symptoms as outlined, requirement for current medical therapies and most importantly because of direct risk to patient if care not provided in a hospital setting.    Clint Mg,

## 2023-12-19 NOTE — PROGRESS NOTES
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 Keep compression with an ACE wrap and elevate. Follow-up on culture results. Blood culture with 1/2 coag neg staph-likely contamination  Covid Rx:    Remdesivir: Initiated 12/18/23  Decadron: Initiated 12/18/23: 6 mg daily x 6 days at this time  Actemra: Not indicated at this time  Paxlovid: May be out of window  Monitor CRP      Medical Decision Making/Summary/Discussion:12/19/2023     Daily Medical Decision Making Provided by the Attending Physician:    Current Problems:    Patient admitted with COVID 19 infection  Unvaccinated individual   Suspected drug overdose  Acute on chronic respiratory failure requiring intubation  Concern for right leg cellulitis  Pulmonary edema  COPD  CAD s/p CABG  HTN  History of left AKA after MRSA infection s/p total knee replacement  History of endocarditis s/p tricuspid valve replacement in 2009  History of opiate abuse  Morbid obesity        Elements of Medical Decision Making:  Note: I have independently performed the steps listed below as part of the medical decision making and evaluation. Examined and discussed with patient. Unable to discuss. Sedated on ventilator.    Changes in Physical exam  Sedated on    Order Status: Completed Specimen: Urine, clean catch Updated: 12/19/23 0647     Specimen Description .CLEAN CATCH URINE     Culture NO SIGNIFICANT GROWTH    COVID-19, Rapid [[id number]]  (Abnormal) Collected: 12/17/23 2030    Order Status: Completed Specimen: Nasopharyngeal Swab Updated: 12/17/23 2041     Specimen Description .NASOPHARYNGEAL SWAB     SARS-CoV-2, Rapid DETECTED     Comment:       Rapid NAAT: The specimen is POSITIVE for SARS-Cov-2, the novel coronavirus associated with   COVID-19.        This test has been authorized by the FDA under an Emergency Use Authorization (EUA) for use   by authorized laboratories.        The ID NOW COVID-19 assay is designed to detect the virus that causes COVID-19 in patients   with signs and symptoms of infection who are suspected of COVID-19.  An individual without symptoms of COVID-19 and who is not shedding SARS-CoV-2 virus would   expect to have a negative (not detected) result in this assay.  Fact sheet for Healthcare Providers: https://www.fda.gov/media/120619/download  Fact sheet for Patients: https://www.fda.gov/media/284669/download        Methodology: Isothermal Nucleic Acid Amplification        Results reported to the appropriate Health Department                   Medical Decision Making-Other:     Note:      MD Néstor Stafford MD Heather Helweg, APRN    ATTESTATION:    I have discussed the case, including pertinent history and exam findings with the APRN. I have evaluated the  History, physical findings and pictures of the patient and the key elements of the encounter have been performed by me. I have reviewed the laboratory data, other diagnostic studies and discussed them with the APRN. I have updated the medical record where necessary.    I agree with the assessment, plan and orders as documented by the APRN.    In addition diagnostic and decision making elements, performed by the Attending Physician, are included in the

## 2023-12-19 NOTE — PLAN OF CARE
Problem: Discharge Planning  Goal: Discharge to home or other facility with appropriate resources  12/18/2023 2041 by Cherie Jay RN  Outcome: Progressing  12/18/2023 1803 by Regina Holly RN  Outcome: Progressing     Problem: Pain  Goal: Verbalizes/displays adequate comfort level or baseline comfort level  12/18/2023 2041 by Cherie Jay RN  Outcome: Progressing  12/18/2023 1803 by Regina Holly RN  Outcome: Progressing     Problem: Respiratory - Adult  Goal: Achieves optimal ventilation and oxygenation  12/18/2023 2041 by Cherie Jay RN  Outcome: Progressing  12/18/2023 2029 by Jacqui Park RCP  Outcome: Progressing  12/18/2023 1803 by Regina Holly RN  Outcome: Progressing  12/18/2023 0734 by Vero Lynn PRASHANTH  Outcome: Progressing     Problem: Skin/Tissue Integrity  Goal: Absence of new skin breakdown  Description: 1.  Monitor for areas of redness and/or skin breakdown  2.  Assess vascular access sites hourly  3.  Every 4-6 hours minimum:  Change oxygen saturation probe site  4.  Every 4-6 hours:  If on nasal continuous positive airway pressure, respiratory therapy assess nares and determine need for appliance change or resting period.  12/18/2023 2041 by Cherie Jay RN  Outcome: Progressing  12/18/2023 1803 by Regina Holly RN  Outcome: Progressing     Problem: Safety - Adult  Goal: Free from fall injury  12/18/2023 2041 by Cherie Jay RN  Outcome: Progressing  Flowsheets (Taken 12/18/2023 2038)  Free From Fall Injury: Instruct family/caregiver on patient safety  12/18/2023 1803 by Regina Holly RN  Outcome: Progressing     Problem: ABCDS Injury Assessment  Goal: Absence of physical injury  12/18/2023 2041 by Cherie Jay RN  Outcome: Progressing  Flowsheets (Taken 12/18/2023 2038)  Absence of Physical Injury: Implement safety measures based on patient assessment  12/18/2023 1803 by Regina Holly RN  Outcome: Progressing     Problem: Chronic Conditions and  Co-morbidities  Goal: Patient's chronic conditions and co-morbidity symptoms are monitored and maintained or improved  12/18/2023 2041 by Vi Restrepo RN  Outcome: Progressing  12/18/2023 1803 by Ramiro Monroy RN  Outcome: Progressing     Problem: Nutrition Deficit:  Goal: Optimize nutritional status  12/18/2023 2041 by Vi Restrepo RN  Outcome: Progressing  12/18/2023 1803 by Ramiro Monroy RN  Outcome: Progressing     Problem: Safety - Medical Restraint  Goal: Remains free of injury from restraints (Restraint for Interference with Medical Device)  Description: INTERVENTIONS:  1. Determine that other, less restrictive measures have been tried or would not be effective before applying the restraint  2. Evaluate the patient's condition at the time of restraint application  3. Inform patient/family regarding the reason for restraint  4.  Q2H: Monitor safety, psychosocial status, comfort, nutrition and hydration  12/18/2023 2041 by Vi Restrepo RN  Outcome: Not Progressing  Flowsheets (Taken 12/18/2023 2000)  Remains free of injury from restraints (restraint for interference with medical device):   Determine that other, less restrictive measures have been tried or would not be effective before applying the restraint   Evaluate the patient's condition at the time of restraint application   Inform patient/family regarding the reason for restraint   Every 2 hours: Monitor safety, psychosocial status, comfort, nutrition and hydration  12/18/2023 1803 by Ramiro Monroy RN  Outcome: Not Progressing  Flowsheets (Taken 12/18/2023 0600 by Vi Restrepo RN)  Remains free of injury from restraints (restraint for interference with medical device):   Determine that other, less restrictive measures have been tried or would not be effective before applying the restraint   Evaluate the patient's condition at the time of restraint application   Inform patient/family regarding the reason for restraint   Every 2

## 2023-12-19 NOTE — PLAN OF CARE
Problem: Respiratory - Adult  Goal: Achieves optimal ventilation and oxygenation  12/19/2023 0815 by Deidre Warner RCP  Outcome: Progressing

## 2023-12-19 NOTE — CONSULTS
Nutrition Note    Consulted for Tube Feedings Order and Management. Pt remains intubated and sedated. Propofol running at 26.9 mL/hr. Suggest starting TF of Peptide Based High Protein formula goal 60 mL/hr = 1440 kcals (+propofol kcals), 126 gm protein per day. For additional information, refer to SHANKAR note from 12/18/22.     Electronically signed by Shyla Avalos RD, LD on 12/19/23 at 11:47 AM EST    Contact: [phone]

## 2023-12-19 NOTE — CONSULTS
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 Keep compression with an ACE wrap and elevate. Follow-up on culture results. 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 Examined and discussed with patient. Unable to discuss. Sedated on ventilator. Changes in Physical exam  Sedated on ventilator.   Afebrile  VS stable  Changes in ROS  Is able will not advance d/t weight; ORDERING SYSTEM PROVIDED HISTORY: Covid +, AMS, intubated, r/o PE TECHNOLOGIST PROVIDED HISTORY: Covid +, AMS, intubated, r/o PE Additional Contrast?->1 Reason for Exam: best images per pt condition, table will not advance d/t weight UNENHANCED CT HEAD: Evidence of prominent streak artifacts including motion induced artifacts and streak artifacts from metallic external object projected over brain. BRAIN/VENTRICLES: There is no acute intracranial hemorrhage, mass effect or midline shift.  No abnormal extra-axial fluid collection.  The gray-white differentiation is obscured significantly due to streak artifacts.  Cerebral lateral ventricles appears to be narrower than on the previous CT scan on 05/22/2015, and, therefore suspicious for cerebral edema.  There is no midline shift. ORBITS: The visualized portion of the orbits demonstrate no acute abnormality. SINUSES: Evidence of marked mucosal thickening in the left maxillary sinus and moderate mucosal thickening in the posterior portion right maxillary sinus.  Most of the ethmoidal sinuses are opacified with mucosal thickening. Moderate mucosal thickenings are noted in the bilateral sphenoidal sinuses and mild mucosal thickening is present in bilateral frontal sinuses. SOFT TISSUES/SKULL:  No acute abnormality of the visualized skull or soft tissues. CTA OF CHEST: MEDIASTINUM: No definable hemorrhage or acute process in the mediastinum.  The patient has been intubated.  Evidence of previous CABG.  Thoracic aorta is of normal size.  Thoracic aorta is not optimally opacified due to timing of contrast enhancement.  There is no obvious dissection in the thoracic aorta. No evidence of pericardial effusion or pericardial thickening. PULMONARY ARTERIES: No evidence of pulmonary embolism.  The peripheral basilar tiny branches of pulmonary arteries are not well visualized due to presence of atelectatic changes/infiltrates in the bases of the lungs.

## 2023-12-19 NOTE — PLAN OF CARE
Problem: Safety - Medical Restraint  Goal: Remains free of injury from restraints (Restraint for Interference with Medical Device)  Description: INTERVENTIONS:  1. Determine that other, less restrictive measures have been tried or would not be effective before applying the restraint  2. Evaluate the patient's condition at the time of restraint application  3. Inform patient/family regarding the reason for restraint  4.  Q2H: Monitor safety, psychosocial status, comfort, nutrition and hydration  12/19/2023 0943 by Lizy Latham RN  Outcome: Progressing  Flowsheets  Taken 12/19/2023 0800 by Lizy Latham RN  Remains free of injury from restraints (restraint for interference with medical device): Every 2 hours: Monitor safety, psychosocial status, comfort, nutrition and hydration  Taken 12/19/2023 0600 by Micheal Amin RN  Remains free of injury from restraints (restraint for interference with medical device):   Determine that other, less restrictive measures have been tried or would not be effective before applying the restraint   Evaluate the patient's condition at the time of restraint application   Inform patient/family regarding the reason for restraint   Every 2 hours: Monitor safety, psychosocial status, comfort, nutrition and hydration  Taken 12/19/2023 0400 by Micheal Amin RN  Remains free of injury from restraints (restraint for interference with medical device):   Determine that other, less restrictive measures have been tried or would not be effective before applying the restraint   Evaluate the patient's condition at the time of restraint application   Inform patient/family regarding the reason for restraint   Every 2 hours: Monitor safety, psychosocial status, comfort, nutrition and hydration  Taken 12/19/2023 0200 by Micheal Amin RN  Remains free of injury from restraints (restraint for interference with medical device):   Determine that other, less restrictive measures have been tried or Cherie, RN  Outcome: Progressing     Problem: Nutrition Deficit:  Goal: Optimize nutritional status  12/19/2023 0943 by Alejandra Brandon RN  Outcome: Progressing  12/18/2023 2041 by Cheire Jay, RN  Outcome: Progressing

## 2023-12-19 NOTE — PROCEDURES
LONG-TERM EEG-VIDEO MONITORING   CLINICAL NEUROPHYSIOLOGY LABORATORY  DEPARTMENT OF NEUROLOGY  [facility]    Patient: Johnny Dozier  Age: 58 y.o.  MRN: [id number]    Referring Physician: No ref. provider found  History: The patient is a 58 y.o. male who presented breakthrough seizure/encephalopathy. This long-term video-EEG monitoring study was performed to determine the nature of the patient's clinical events. The patient is on neuroactive medications.   Johnny Dozier   Current Facility-Administered Medications   Medication Dose Route Frequency Provider Last Rate Last Admin    labetalol (NORMODYNE;TRANDATE) injection 10 mg  10 mg IntraVENous Q4H PRN Chaparrita Freire MD   10 mg at 12/19/23 0447    calcium gluconate 2,000 mg in sodium chloride 100 mL  2,000 mg IntraVENous Once Forest Mitchell MD 50 mL/hr at 12/19/23 0904 2,000 mg at 12/19/23 0904    aspirin chewable tablet 81 mg  81 mg Oral Daily Forest Mitchell MD   81 mg at 12/19/23 0828    propofol infusion  5-50 mcg/kg/min IntraVENous Continuous Lluvia Huerta MD 26.9 mL/hr at 12/19/23 0804 20 mcg/kg/min at 12/19/23 0804    midazolam (VERSED) 100mg/100mL in NS infusion  1-10 mg/hr IntraVENous Continuous Lluvia Huerta MD 8 mL/hr at 12/19/23 0721 8 mg/hr at 12/19/23 0721    fentaNYL 50 mcg/mL 50 mL infusion   mcg/hr IntraVENous Continuous Lluvia Huerta MD 3 mL/hr at 12/19/23 0827 150 mcg/hr at 12/19/23 0827    sodium chloride flush 0.9 % injection 5-40 mL  5-40 mL IntraVENous 2 times per day Chaparrita Freire MD   10 mL at 12/19/23 0858    sodium chloride flush 0.9 % injection 5-40 mL  5-40 mL IntraVENous PRN Chaparrita Freire MD        0.9 % sodium chloride infusion   IntraVENous PRN Chaparrita Freire MD 10 mL/hr at 12/19/23 0721 Rate Verify at 12/19/23 0721    potassium chloride 20 mEq/50 mL IVPB (Central Line)  20 mEq IntraVENous PRN Chaparrita Freire MD        Or    potassium chloride 10  patient had no events or seizures. Summary: During this day of recording no events were recorded. The interictal EEG was abnormal due to diffuse polymorphic delta and and theta slowing suggesting moderate to severe encephalopathy. No abnormal epileptiform activity was seen. Monitoring was continued in order to record the patient's typical events. The EKG channel revealed no abnormalities. Zach Eastman MD  Diplomate, American Board of Psychiatry and Neurology  Diplomate, American Board of Clinical Neurophysiology  Diplomate, American Board of Epilepsy     Please note this is a preliminary report and updated daily. The final report will have a summary of behavior and electrographic findings with clinical correlation.

## 2023-12-20 NOTE — PLAN OF CARE
Problem: Discharge Planning  Goal: Discharge to home or other facility with appropriate resources  12/19/2023 2114 by Cherie Jay RN  Outcome: Progressing  12/19/2023 0943 by Alejandra Brandon RN  Outcome: Progressing     Problem: Pain  Goal: Verbalizes/displays adequate comfort level or baseline comfort level  12/19/2023 2114 by Cherie Jay RN  Outcome: Progressing  12/19/2023 0943 by Alejandra Brandon RN  Outcome: Progressing     Problem: Respiratory - Adult  Goal: Achieves optimal ventilation and oxygenation  12/19/2023 2114 by Cherie Jay RN  Outcome: Progressing  12/19/2023 2032 by Teri Stevens RCP  Flowsheets (Taken 12/19/2023 2032)  Achieves optimal ventilation and oxygenation:   Respiratory therapy support as indicated   Assess for changes in respiratory status   Assess for changes in mentation and behavior   Oxygen supplementation based on oxygen saturation or arterial blood gases  12/19/2023 0943 by Alejandra Brandon RN  Outcome: Progressing  12/19/2023 0815 by Cassandra Xiong RCP  Outcome: Progressing     Problem: Skin/Tissue Integrity  Goal: Absence of new skin breakdown  Description: 1.  Monitor for areas of redness and/or skin breakdown  2.  Assess vascular access sites hourly  3.  Every 4-6 hours minimum:  Change oxygen saturation probe site  4.  Every 4-6 hours:  If on nasal continuous positive airway pressure, respiratory therapy assess nares and determine need for appliance change or resting period.  12/19/2023 2114 by Cherie Jay RN  Outcome: Progressing  12/19/2023 0943 by Alejandra Brandon RN  Outcome: Progressing     Problem: Safety - Adult  Goal: Free from fall injury  12/19/2023 2114 by Cherie Jay RN  Outcome: Progressing  Flowsheets (Taken 12/19/2023 2113)  Free From Fall Injury: Instruct family/caregiver on patient safety  12/19/2023 0943 by Alejandra Brandon RN  Outcome: Progressing     Problem: ABCDS Injury Assessment  Goal: Absence of physical injury  12/19/2023 2114 by  hydration  Taken 12/19/2023 1600 by Rosetta Matthew RN  Remains free of injury from restraints (restraint for interference with medical device): Every 2 hours: Monitor safety, psychosocial status, comfort, nutrition and hydration  Taken 12/19/2023 1400 by Rosetta Matthew RN  Remains free of injury from restraints (restraint for interference with medical device): Every 2 hours: Monitor safety, psychosocial status, comfort, nutrition and hydration  Taken 12/19/2023 1200 by Rosetta Matthew RN  Remains free of injury from restraints (restraint for interference with medical device): Every 2 hours: Monitor safety, psychosocial status, comfort, nutrition and hydration  Taken 12/19/2023 1000 by Rosetta Matthew RN  Remains free of injury from restraints (restraint for interference with medical device): Every 2 hours: Monitor safety, psychosocial status, comfort, nutrition and hydration  12/19/2023 0943 by Rosetta Matthew RN  Outcome: Progressing  Flowsheets  Taken 12/19/2023 0800 by Rosetta Matthew RN  Remains free of injury from restraints (restraint for interference with medical device): Every 2 hours: Monitor safety, psychosocial status, comfort, nutrition and hydration  Taken 12/19/2023 0600 by Lottie Horan RN  Remains free of injury from restraints (restraint for interference with medical device):   Determine that other, less restrictive measures have been tried or would not be effective before applying the restraint   Evaluate the patient's condition at the time of restraint application   Inform patient/family regarding the reason for restraint   Every 2 hours: Monitor safety, psychosocial status, comfort, nutrition and hydration  Taken 12/19/2023 0400 by Lottie Horan RN  Remains free of injury from restraints (restraint for interference with medical device):   Determine that other, less restrictive measures have been tried or would not be effective before applying the restraint   Evaluate the patient's condition at the time

## 2023-12-20 NOTE — PLAN OF CARE
Problem: Confusion  Goal: Confusion, delirium, dementia, or psychosis is improved or at baseline  Description: INTERVENTIONS:  1. Assess for possible contributors to thought disturbance, including medications, impaired vision or hearing, underlying metabolic abnormalities, dehydration, psychiatric diagnoses, and notify attending LIP  2. [place] high risk fall precautions, as indicated  3. Provide frequent short contacts to provide reality reorientation, refocusing and direction  4. Decrease environmental stimuli, including noise as appropriate  5. Monitor and intervene to maintain adequate nutrition, hydration, elimination, sleep and activity  6. If unable to ensure safety without constant attention obtain sitter and review sitter guidelines with assigned personnel  7. Initiate Psychosocial CNS and Spiritual Care consult, as indicated  Outcome: Not Progressing     Problem: Safety - Medical Restraint  Goal: Remains free of injury from restraints (Restraint for Interference with Medical Device)  Description: INTERVENTIONS:  1. Determine that other, less restrictive measures have been tried or would not be effective before applying the restraint  2. Evaluate the patient's condition at the time of restraint application  3. Inform patient/family regarding the reason for restraint  4.  Q2H: Monitor safety, psychosocial status, comfort, nutrition and hydration  12/20/2023 0937 by Sonia Sanderson RN  Outcome: Progressing  Flowsheets  Taken 12/20/2023 0800 by Sonia Sanderson RN  Remains free of injury from restraints (restraint for interference with medical device): Determine that other, less restrictive measures have been tried or would not be effective before applying the restraint  Taken 12/20/2023 0600 by Светлана Almanza RN  Remains free of injury from restraints (restraint for interference with medical device):   Determine that other, less restrictive measures have been tried or would not be effective

## 2023-12-20 NOTE — PROCEDURES
LONG-TERM EEG-VIDEO [address] [facility] [address] [employer]    Patient: Jerri Howard  Age: 63 y.o. MRN: [id number]    Referring Physician: No ref. provider found  History: The patient is a 63 y.o. male who presented breakthrough seizure/encephalopathy. This long-term video-EEG monitoring study was performed to determine the nature of the patient's clinical events. The patient is on neuroactive medications.    Yanna Dozier   Current Facility-Administered Medications   Medication Dose Route Frequency Provider Last Rate Last Admin    labetalol (NORMODYNE;TRANDATE) injection 10 mg  10 mg IntraVENous Q4H PRN Ho Ennis MD   10 mg at 12/20/23 2663    aspirin chewable tablet 81 mg  81 mg Oral Daily Elicia Pate MD   81 mg at 12/20/23 0805    propofol infusion  5-50 mcg/kg/min IntraVENous Continuous Melodie Gonzalez MD 26.9 mL/hr at 12/20/23 1028 20 mcg/kg/min at 12/20/23 1028    midazolam (VERSED) 100mg/100mL in NS infusion  1-10 mg/hr IntraVENous Continuous Melodie Gonzalez MD 5 mL/hr at 12/20/23 0840 5 mg/hr at 12/20/23 0840    fentaNYL 50 mcg/mL 50 mL infusion   mcg/hr IntraVENous Continuous Melodie Gonzalez MD 3 mL/hr at 12/20/23 0840 150 mcg/hr at 12/20/23 0840    sodium chloride flush 0.9 % injection 5-40 mL  5-40 mL IntraVENous 2 times per day Ho Ennis MD   10 mL at 12/20/23 0225    sodium chloride flush 0.9 % injection 5-40 mL  5-40 mL IntraVENous PRN Ho Ennis MD        0.9 % sodium chloride infusion   IntraVENous PRN Ho Ennis MD 10 mL/hr at 12/20/23 0840 Rate Verify at 12/20/23 0840    potassium chloride 20 mEq/50 mL IVPB (Central Line)  20 mEq IntraVENous PRN Ho Ennis MD        Or    potassium chloride 10 mEq/100 mL IVPB (Peripheral Line)  10 mEq IntraVENous PRN Ho Ennis MD        magnesium sulfate 2000 mg in 50 mL IVPB premix  2,000 mg IntraVENous PRN

## 2023-12-20 NOTE — PLAN OF CARE
Problem: Respiratory - Adult  Goal: Achieves optimal ventilation and oxygenation  12/20/2023 0800 by Sun Sharma RCP  Outcome: Progressing

## 2023-12-20 NOTE — PROGRESS NOTES
congestion.  Interstitial pulmonary edema is suspected. No evidence of pneumothorax or pneumomediastinum. No obvious pleural effusion.  Probable minimal pleural thickening at the posterior aspect of base of the lungs. BONY THORAX: No definable fracture in bilateral ribs or in the thoracic spine.  Mild to moderate multilevel spondylosis in the thoracic spine without significant stenosis. UPPER ABDOMEN: No definable acute process in visualized upper abdomen.  In the visualized upper and midportion of liver and spleen there is no focal abnormality.  In the visualized upper pole of right kidney there is a large cyst, measuring 10.6 cm, as also noted on previous CT scan of chest on 04/29/2021.  Rest of the right kidney is not visualized.  Upper pole of left kidney is unremarkable.  Tail and body of pancreas are unremarkable.  In the gallbladder there is single small stone demonstrated.     CT scan of the head is significantly limited due to prominent streak artifacts, due to patient's motions and also due to external metallic object. No obvious intracranial hemorrhage identified.  The cerebral lateral ventricles appears to be smaller in size as compared to previous CT scan of the head in 2015.  Therefore there is suspicion for diffuse cerebral edema. There is no midline shift. No diagnostic finding in calvarium. Evidence of diffuse sinusitis involving bilateral maxillary sinuses and ethmoidal sinuses.  Milder sinusitis in the bilateral frontal and sphenoidal sinuses. On the CT of chest, there is no evidence of pulmonary embolism. No definable thoracic aortic aneurysm or dissection.  Evidence of previous CABG.  No acute process in the mediastinum. Mild-to-moderate atelectatic changes, and/or infiltrate in the right pulmonary lower lobe.  Mild atelectatic changes, and/or infiltrates in the left pulmonary lower lobe, posterior portion of base of right pulmonary upper lobe and less obvious in the lingula of left lung.  .SPUTUM     Direct Exam >25 NEUTROPHILS/LPF      < 10 EPITHELIAL CELLS/LPF      MIXED BACTERIAL MORPHOTYPES SEEN ON GRAM STAIN.     Culture NORMAL RESPIRATORY OVIDIO HEAVY GROWTH    MRSA DNA Probe, Nasal [[id number]] Collected: 12/18/23 0432    Order Status: Completed Specimen: Nasal Updated: 12/18/23 0935     Specimen Description .NASAL SWAB     MRSA, DNA, Nasal NEGATIVE     Comment: NEGATIVE:  MRSA DNA not detected by nucleic acid amplification.                                                    Results should be used as an adjunct to nosocomial control efforts to identify patients   needing enhanced precautions.    The test is not intended to identify patients with staphylococcal infections.  Results   should not be used to guide or monitor treatment for MRSA infections.         Culture, Blood 1 [[id number]]  (Abnormal) Collected: 12/18/23 0056    Order Status: Completed Specimen: Blood Updated: 12/19/23 1014     Specimen Description .BLOOD     Special Requests L UPPERARM 5ML     Culture POSITIVE Blood Culture      DIRECT GRAM STAIN FROM BOTTLE: GRAM POSITIVE COCCI IN CLUSTERS      Detected: Coagulase negative Staphylococcus species (not  S.epidermidis, or S. lugdunensis) Culture Results to Follow Methodology- Polymerase Chain Reaction (PCR)      STAPHYLOCOCCUS SPECIES, COAGULASE NEGATIVE A single positive blood culture of coagulase negative Staphylocci, diphtheroids,micrococci, Cutibacterium, viridans Streptocci, Bacillus, or Lactobacillus species should be interpreted with caution and viewed as a likely skin contaminant.        (NOTE) Direct Gram Stain from bottle and Polymerase Chain Reaction (PCR) results called to and read back by: CEZAR WOMACK AT 1711 ON 12.18.2023    Culture, Blood 1 [[id number]] Collected: 12/18/23 0044    Order Status: Completed Specimen: Blood Updated: 12/20/23 0122     Specimen Description .BLOOD     Special Requests R AC 2ML     Culture NO GROWTH 2 DAYS    Culture, Urine [[id number]]  Collected: 12/18/23 0038    Order Status: Completed Specimen: Urine, clean catch Updated: 12/19/23 0647     Specimen Description . CLEAN CATCH URINE     Culture NO SIGNIFICANT GROWTH    COVID-19, Rapid [[id number]]  (Abnormal) Collected: 12/17/23 2030    Order Status: Completed Specimen: Nasopharyngeal Swab Updated: 12/17/23 2041     Specimen Description . NASOPHARYNGEAL SWAB     SARS-CoV-2, Rapid DETECTED     Comment:       Rapid NAAT: The specimen is POSITIVE for SARS-Cov-2, the novel coronavirus associated with   COVID-19. This test has been authorized by the FDA under an Emergency Use Authorization (EUA) for use   by authorized laboratories. The ID NOW COVID-19 assay is designed to detect the virus that causes COVID-19 in patients   with signs and symptoms of infection who are suspected of COVID-19. An individual without symptoms of COVID-19 and who is not shedding SARS-CoV-2 virus would   expect to have a negative (not detected) result in this assay. Fact sheet for Healthcare Providers: FindDrives.pl  Fact sheet for Patients: FindDrives.pl        Methodology: Isothermal Nucleic Acid Amplification        Results reported to the appropriate Health Department                   Medical Decision Making-Other:     Note:      Danial Dandy, MD Ottie Sabine, APRN    ATTESTATION:    I have discussed the case, including pertinent history and exam findings with the APRN. I have evaluated the  History, physical findings and pictures of the patient and the key elements of the encounter have been performed by me. I have reviewed the laboratory data, other diagnostic studies and discussed them with the APRN. I have updated the medical record where necessary. I agree with the assessment, plan and orders as documented by the APRN.     In addition diagnostic and decision making elements, performed by the Attending Physician, are included in the

## 2023-12-20 NOTE — PLAN OF CARE
Problem: Respiratory - Adult  Goal: Achieves optimal ventilation and oxygenation  12/19/2023 2032 by Teri Stevens, ASHISH  Flowsheets (Taken 12/19/2023 2032)  Achieves optimal ventilation and oxygenation:   Respiratory therapy support as indicated   Assess for changes in respiratory status   Assess for changes in mentation and behavior   Oxygen supplementation based on oxygen saturation or arterial blood gases

## 2023-12-20 NOTE — PROGRESS NOTES
Critical Care Team - Daily Progress Note      Date and time: 12/20/2023 7:22 AM  Patient's name:  Johnny Dozier  Medical Record Number: [id number]  Patient's account/billing number: [id number]  Patient's YOB: 1965  Age: 58 y.o.  Date of Admission: 12/17/2023 11:55 PM  Length of stay during current admission: 2      Primary Care Physician: Tabatha Guajardo MD  ICU Attending Physician:      Code Status: Full Code    Reason for ICU admission:   Chief Complaint   Patient presents with    Altered Mental Status         SUBJECTIVE:     OVERNIGHT EVENTS:       No acute events overnight    Interval history:  -Patient intubated and sedated  -Hemodynamically stable with blood pressure 162/87 mmHg with MAP of 108.  -Currently sedated on 150 mcg/h fentanyl, 20 mcg/kg/min propofol, and 6 mg/h Versed drip  -On ventilator settings rate set 16, tidal volume 500, PEEP of 12, FiO2 85 increased from 80  -Latest ABG showed pH 7.430, pCO2 58.0, pO2 62.4, and HCO3 38.6.  -Labs show sodium 141, potassium 4.6, BUN 17, creatinine 0.7, GFR 60, glucose 202, CRP 81.8 decreased from 100.5, ALT 44, , WBC 5.3, and hemoglobin 10.3.  -Urine output 3.2 L / 24 hours.  I/os -853 mL.  -835 mL since admit.  -COVID-positive.  Infectious disease recommended remdesivir initiated on 12/18/2023 and Decadron initiated on same day.  6-day Decadron dose.  Isolation until 12/27/2023.  -LTME  suggests moderate to severe encephalopathy but no epileptiform activity as of now.    HPI:  Johnny Dozier is a 58 y.o. male who presents to the ED as a transfer from [place] after he was found to be unresponsive at the [facility], he does have a history of drug use and was given Narcan with no response, and had to be intubated for airway protection. EMS also reports that he had upper extremity tremors concerning for seizure activity.      urine drug screen was positive for opiates, he also tested positive for COVID.  Chest x-ray was  encounter with the resident. I have seen and examined the patient with the resident. I agree with the assessment and plan and status of the problem list as documented. I seen the patient during the rounds today, chart reviewed overnight events noted. Patient remains on high ventilatory settings PEEP of 12 and FiO2 of 85%. He is blood gases 7.4 3/58/62/38 on ventilator settings PRVC of 16 tidal volume 500 PEEP is 12 and FiO2 is 85% currently saturating 93 to 94%. He had received Lasix 40 mg IV his urine output was 3.2 L he still remained hypoxic. Will continue with ventilator support and wean FiO2 to keep saturation 88%. His bicarbonate is 32 will give Lasix 40 mg daily. Will discontinue IV fluid. Continue with bronchodilator. He is currently on Rocephin followed by infectious disease and he is on Decadron and on remdesivir. He is on propofol, Versed drip and fentanyl drip will wean Versed drip to off continue with fentanyl and propofol. Discussed with nursing staff, treatment and plan discussed. Discussed with respiratory therapist.    Total critical care time caring for this patient with life threatening, unstable organ failure, including direct patient contact, management of life support systems, review of data including imaging and labs, discussions with other team members and physicians at least 28  Min so far today, excluding procedures. Please note that this chart was generated using voice recognition Dragon dictation software. Although every effort was made to ensure the accuracy of this automated transcription, some errors in transcription may have occurred.      Lemuel Cary MD  12/20/2023 12:32 PM

## 2023-12-21 NOTE — PROGRESS NOTES
[address] [facility] Neurology   [address] [facility]    Progress note             Date:   12/20/2023  Patient name:  Unknown Daphney  Date of admission:  12/17/2023 11:55 PM  MRN:   [id number]  Account:  [de-identified]  YOB: 1965  PCP:    Andrew Sharma MD  Room:   [address] [place] [address]  Code Status:    Full Code    Chief Complaint:     Chief Complaint   Patient presents with    Altered Mental Status       Interval hx: The Patient was seen and examined at bedside  Is vitally stable   No acute events overnight  Coordinated with patient's nurse this morning and had sedation wean started around 2:30 PM.  Patient was off all sedation for next half hour and examined with myself and patient's nurse at bedside around 3:00 PM.  At that time patient easily awakened to voice opening eyes spontaneously tracking in all directions. Patient able to follow simple 1 and two-step commands with bilateral upper extremities wiggling right lower extremity to command and raising left lower stump also to command. Appears neurologically intact without any focal neurologic deficits and answering questions appropriately with head nod yes and no. Given increased oxygen demand CT head without still has been postponed and is nonurgent given he has intact neurologic exam however would benefit from repeat CT head without once medically stable to ensure resolution of previously concerning diffuse cerebral edema most likely related to metabolic given severe hypercarbia on arrival with CO2 levels nearly 100. Brief History of Present Illness: The patient is a 63 y.o.  male who presents with Altered Mental Status and acute respiratory failure in the setting of COVID-19 infection and pneumonia. PMH significant for CAD s/p valve replacement, COPD, Previous episode of NEO July 2010 due to MRSA infection and sepsis with renal failure required Acute short term HD and now recovered, HTN, previous substance abuse. 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  Given his body habitus and multiple comorbid conditions unable to ambulate at baseline.       Investigations:      Laboratory Testing:  Recent Results (from the past 24 hour(s))   Basic Metabolic Panel w/ Reflex to MG    Collection Time: 12/20/23  3:51 AM   Result Value Ref Range    Sodium 141 135 - 144 mmol/L    Potassium 4.6 3.7 - 5.3 mmol/L    Chloride 103 98 - 107 mmol/L    CO2 32 (H) 20 - 31 mmol/L    Anion Gap 6 (L) 9 - 17 mmol/L    Glucose 202 (H) 70 - 99 mg/dL    BUN 17 6 - 20 mg/dL    Creatinine 0.7 0.7 - 1.2 mg/dL    Est, Glom Filt Rate >60 >60 mL/min/1.73m2    Calcium 8.3 (L) 8.6 - 10.4 mg/dL   CBC with Auto Differential    Collection Time: 12/20/23  3:51 AM   Result Value Ref Range    WBC 5.3 3.5 - 11.3 k/uL    RBC 4.10 (L) 4.21 - 5.77 m/uL    Hemoglobin 10.3 (L) 13.0 - 17.0 g/dL    Hematocrit 35.7 (L) 40.7 - 50.3 %    MCV 87.1 82.6 - 102.9 fL    MCH 25.1 (L) 25.2 - 33.5 pg    MCHC 28.9 28.4 - 34.8 g/dL    RDW 17.3 (H) 11.8 - 14.4 %    Platelets 189 138 - 453 k/uL    MPV 10.3 8.1 - 13.5 fL    NRBC Automated 0.0 0.0 per 100 WBC    Neutrophils % 75 (H) 36 - 65 %    Lymphocytes % 14 (L) 24 - 43 %    Monocytes % 10 3 - 12 %    Eosinophils % 0 (L) 1 - 4 %    Basophils % 0 0 - 2 %    Immature Granulocytes 1 (H) 0 %    Neutrophils Absolute 3.96 1.50 - 8.10 k/uL    Lymphocytes Absolute 0.76 (L) 1.10 - 3.70 k/uL    Monocytes Absolute 0.51 0.10 - 1.20 k/uL    Eosinophils Absolute <0.03 0.00 - 0.44 k/uL    Basophils Absolute <0.03 0.00 - 0.20 k/uL    Absolute Immature Granulocyte 0.05 0.00 - 0.30 k/uL    RBC Morphology ANISOCYTOSIS PRESENT    C-Reactive Protein    Collection Time: 12/20/23  3:51 AM   Result Value Ref Range    CRP 81.8

## 2023-12-21 NOTE — PLAN OF CARE
Problem: Safety - Medical Restraint  Goal: Remains free of injury from restraints (Restraint for Interference with Medical Device)  Description: INTERVENTIONS:  1. Determine that other, less restrictive measures have been tried or would not be effective before applying the restraint  2. Evaluate the patient's condition at the time of restraint application  3. Inform patient/family regarding the reason for restraint  4.  Q2H: Monitor safety, psychosocial status, comfort, nutrition and hydration  Outcome: Progressing  Flowsheets  Taken 12/21/2023 0200 by Eliza Soria RN  Remains free of injury from restraints (restraint for interference with medical device): Every 2 hours: Monitor safety, psychosocial status, comfort, nutrition and hydration  Taken 12/21/2023 0000 by Eliza Soria RN  Remains free of injury from restraints (restraint for interference with medical device): Every 2 hours: Monitor safety, psychosocial status, comfort, nutrition and hydration  Taken 12/20/2023 2200 by Eliza Soria RN  Remains free of injury from restraints (restraint for interference with medical device): Every 2 hours: Monitor safety, psychosocial status, comfort, nutrition and hydration  Taken 12/20/2023 2000 by Eliza Soria RN  Remains free of injury from restraints (restraint for interference with medical device): Every 2 hours: Monitor safety, psychosocial status, comfort, nutrition and hydration  Taken 12/20/2023 1853 by Elizabet Nam RN  Remains free of injury from restraints (restraint for interference with medical device): Determine that other, less restrictive measures have been tried or would not be effective before applying the restraint  Taken 12/20/2023 1800 by Elizabet Nam RN  Remains free of injury from restraints (restraint for interference with medical device): Determine that other, less restrictive measures have been tried or would not be effective before applying the restraint  Taken 12/20/2023 1600 by

## 2023-12-21 NOTE — PROCEDURES
LONG-TERM EEG-VIDEO [address] [facility] [address] [email or web address]    Patient: Esperanza Whipple  Age: 63 y.o. MRN: [id number]    Referring Physician: No ref. provider found  History: The patient is a 63 y.o. male who presented breakthrough seizure/encephalopathy. This long-term video-EEG monitoring study was performed to determine the nature of the patient's clinical events. The patient is on neuroactive medications.    Johnny Dozier   Current Facility-Administered Medications   Medication Dose Route Frequency Provider Last Rate Last Admin    insulin lispro (HUMALOG) injection vial 0-4 Units  0-4 Units SubCUTAneous Q6H Tamie Ruano MD   2 Units at 12/21/23 0814    furosemide (LASIX) injection 40 mg  40 mg IntraVENous Daily Tamie Ruano MD   40 mg at 12/21/23 0815    hydrALAZINE (APRESOLINE) injection 10 mg  10 mg IntraVENous Q6H PRN Tamie Ruano MD   10 mg at 12/20/23 1543    labetalol (NORMODYNE;TRANDATE) injection 10 mg  10 mg IntraVENous Q4H PRN Alexandr Lucas MD   10 mg at 12/20/23 1346    aspirin chewable tablet 81 mg  81 mg Oral Daily Tamie Ruano MD   81 mg at 12/21/23 0814    propofol infusion  5-50 mcg/kg/min IntraVENous Continuous Ness Rose MD 26.9 mL/hr at 12/21/23 0811 20 mcg/kg/min at 12/21/23 0811    midazolam (VERSED) 100mg/100mL in NS infusion  1-10 mg/hr IntraVENous Continuous Ness Rose MD   Stopped at 12/20/23 1241    fentaNYL 50 mcg/mL 50 mL infusion   mcg/hr IntraVENous Continuous Ness Rose MD 2 mL/hr at 12/21/23 0645 100 mcg/hr at 12/21/23 0645    sodium chloride flush 0.9 % injection 5-40 mL  5-40 mL IntraVENous 2 times per day Alexandr Lucas MD   10 mL at 12/21/23 0831    sodium chloride flush 0.9 % injection 5-40 mL  5-40 mL IntraVENous PRN Alexandr Lucas MD        0.9 % sodium chloride infusion   IntraVENous PRN Alexandr Lucas MD 10 mL/hr at chloride 0.9 % 250 mL IVPB  100 mg IntraVENous Q24H Neetu De Luna APRN - CNP   Stopped at 12/20/23 1328    sodium chloride 0.9 % bolus 30 mL  30 mL IntraVENous PRN Neetu De Luna APRN - CNP         Technical Description: This is a 21-channel digital EEG recording with time-locked video. Electrodes were placed in accordance with the 10-20 International System of Electrode Placement. Single lead EKG monitoring was included.    Baseline EEG Recording:  A formal baseline EEG recording was not obtained.     Day 1 - 12/18/23, starting at 12:07 pm    Interictal EEG Samples:  The background activity consisted of 4-5 Hz of polymorphic delta and theta slowing of 25-30 uV. The background was continuous and symmetric. The background showed state change and reactivity. During behavioral sleep, sleep spindles were seen, symmetric over both hemispheres. The EKG channel revealed no abnormalities.    Ictal EEG Recording / Patient Events: During this period the patient had no events or seizures.    Summary: During this day of recording no events were recorded. The interictal EEG was abnormal due to diffuse polymorphic delta and and theta slowing suggesting moderate to severe encephalopathy. No abnormal epileptiform activity was seen. Monitoring was continued in order to record the patient's typical events. The EKG channel revealed no abnormalities.    Day 2 - 12/19/23    Interictal EEG Samples: As the recording progressed, the background activity showed 5 to 6 Hz of polymorphic theta activity.    Ictal EEG Recording / Patient Events: During this period the patient had no events or seizures.    Summary: During this day of recording no events were recorded. The interictal EEG was abnormal due to diffuse polymorphic theta activity suggesting mild to moderate encephalopathy, improved from yesterday. No abnormal epileptiform activity was seen. Monitoring was continued in order to record the patient's typical events. The EKG

## 2023-12-21 NOTE — PROGRESS NOTES
ECMO Consult Note:    Pt discussed with Dr Emily Lazaro, at this time and d/t multiple comorbidities pt will continue to be optimized and watched closely. Awaiting 2-d ECHO results as well. If pt declines it was agreeded upon that we would re-evaluate in order to determine cannulation candidacy as well as review further studies. Please reach out with any questions or concerns.      Samuel Francis   ECMO [facility]/Coordinator  [phone]

## 2023-12-21 NOTE — PROGRESS NOTES
Comprehensive Nutrition Assessment    Type and Reason for Visit:  Reassess    Nutrition Recommendations/Plan:   Continue TF of Peptide Based High Protein formula at goal 60 mL/hr with current rate of propofol. Monitor TF tolerance/adequacy of intakes and rate of propofol - modify as needed. Will monitor labs, weights, and plan of care. Malnutrition Assessment:  Malnutrition Status:  Insufficient data (12/18/23 1446)    Context:  Acute Illness       Nutrition Assessment:    Pt remains intubated and sedated. Propofol continues at 26.9 mL/hr. Tolerating TF of Peptide Based High Protein formula at goal 60 mL/hr. No recorded BM since admission. Weight fluctuations noted. Labs reviewed: Triglycerides 185 mg/dL, Glucose 257-269 mg/dL. Meds include: Decadron, Humalog SS. Nutrition Related Findings:    labs/meds reviewed. hypoactive bowel sounds. Wound Type: None       Current Nutrition Intake & Therapies:    Average Meal Intake: NPO  Average Supplements Intake: NPO  Diet NPO  ADULT TUBE FEEDING; Orogastric; Peptide Based High Protein; Continuous; 10; Yes; 10; Q 4 hours; 60; 50; Q 4 hours  Current Tube Feeding (TF) Orders:  Feeding Route: Orogastric  Formula: Peptide Based High Protein  Schedule: Continuous  Feeding Regimen: 60 mL/hr  Additives/Modulars: None  Water Flushes: 50 mL q 4 hrs or per MD  Current TF & Flush Orders Provides: At 60 mL/hr = 1440 kcals, 126 gm protein  Goal TF & Flush Orders Provides: Peptide Based High Protein at 60 mL/hr = 1440 kcals, 126 gm protein per day    Additional Calorie Sources:  Propofol at 26.9 mL/hr = 710 kcals    Anthropometric Measures:  Height: 190.5 cm (6' 3\")  Ideal Body Weight (IBW): 196 lbs (89 kg)    Admission Body Weight: 224.1 kg (494 lb 0.8 oz)  Current Body Weight: 218.3 kg (481 lb 4.2 oz), 245.5 % IBW.  Weight Source: Bed Scale  Current BMI (kg/m2): 60.2        Weight Adjustment For: Amputation  Total Adjusted Percentage (Calculated): 10.1  Adjusted Ideal

## 2023-12-21 NOTE — PLAN OF CARE
Problem: Respiratory - Adult  Goal: Achieves optimal ventilation and oxygenation  12/21/2023 0906 by Tamir Morris RN  Outcome: Not Progressing  12/21/2023 0751 by Lety Blanco RCP  Outcome: Progressing     Problem: Safety - Medical Restraint  Goal: Remains free of injury from restraints (Restraint for Interference with Medical Device)  Description: INTERVENTIONS:  1. Determine that other, less restrictive measures have been tried or would not be effective before applying the restraint  2. Evaluate the patient's condition at the time of restraint application  3. Inform patient/family regarding the reason for restraint  4.  Q2H: Monitor safety, psychosocial status, comfort, nutrition and hydration  12/21/2023 0906 by Tamir Morris RN  Outcome: Progressing  Flowsheets  Taken 12/21/2023 0800 by Tamir Morris RN  Remains free of injury from restraints (restraint for interference with medical device): Every 2 hours: Monitor safety, psychosocial status, comfort, nutrition and hydration  Taken 12/21/2023 0600 by Cat Yo RN  Remains free of injury from restraints (restraint for interference with medical device): Every 2 hours: Monitor safety, psychosocial status, comfort, nutrition and hydration  Taken 12/21/2023 0400 by Cat Yo RN  Remains free of injury from restraints (restraint for interference with medical device): Every 2 hours: Monitor safety, psychosocial status, comfort, nutrition and hydration  12/21/2023 0317 by Cat Yo RN  Outcome: Progressing  Flowsheets  Taken 12/21/2023 0200 by Bridger Phillips [employer] [place] free of injury from restraints (restraint for interference with medical device): Every 2 hours: Monitor safety, psychosocial status, comfort, nutrition and hydration  Taken 12/21/2023 0000 by Cat Yo RN  Remains free of injury from restraints (restraint for interference with medical device): Every 2 hours: Monitor safety, psychosocial status, comfort, nutrition using appropriate pain scale     Problem: Skin/Tissue Integrity  Goal: Absence of new skin breakdown  Description: 1.  Monitor for areas of redness and/or skin breakdown  2.  Assess vascular access sites hourly  3.  Every 4-6 hours minimum:  Change oxygen saturation probe site  4.  Every 4-6 hours:  If on nasal continuous positive airway pressure, respiratory therapy assess nares and determine need for appliance change or resting period.  Outcome: Progressing     Problem: Safety - Adult  Goal: Free from fall injury  Outcome: Progressing     Problem: ABCDS Injury Assessment  Goal: Absence of physical injury  Outcome: Progressing  Flowsheets (Taken 12/21/2023 0905)  Absence of Physical Injury: Implement safety measures based on patient assessment     Problem: Chronic Conditions and Co-morbidities  Goal: Patient's chronic conditions and co-morbidity symptoms are monitored and maintained or improved  Outcome: Progressing     Problem: Nutrition Deficit:  Goal: Optimize nutritional status  12/21/2023 0906 by Jose Ordonez, RN  Outcome: Progressing  12/21/2023 0317 by Sean Birmingham, RN  Outcome: Progressing     Problem: Confusion  Goal: Confusion, delirium, dementia, or psychosis is improved or at baseline  Description: INTERVENTIONS:  1. Assess for possible contributors to thought disturbance, including medications, impaired vision or hearing, underlying metabolic abnormalities, dehydration, psychiatric diagnoses, and notify attending LIP  2. [place] high risk fall precautions, as indicated  3. Provide frequent short contacts to provide reality reorientation, refocusing and direction  4. Decrease environmental stimuli, including noise as appropriate  5. Monitor and intervene to maintain adequate nutrition, hydration, elimination, sleep and activity  6. If unable to ensure safety without constant attention obtain sitter and review sitter guidelines with assigned personnel  7. Initiate Psychosocial CNS and Spiritual

## 2023-12-21 NOTE — PROGRESS NOTES
Physician Progress Note      Divya Singh  CADY #:                  [id number]  :                       1965  ADMIT DATE:       2023 11:55 PM  DISCH DATE:  RESPONDING  PROVIDER #:        CATHY CORTES          QUERY TEXT:    Pt admitted with COVID-19. If possible, please document in progress notes and   discharge summary if you are evaluating and/or treating: The medical record reflects the following:  Risk Factors: Morbid obesity,COPD,HTN,Substance abuse  Clinical Indicators: Found down at [facility]- Covid positive with Actue Respiratory   failure requiring intubation-vented in ICU. Agitated on exam.Toxic Metabolic   Encephalopathy . TMax 100.2  Labs- CRP 47, , Myoglobin 260, Procal .19  Treatment: ID consult, IV Remdesivir and Decadron, mechanical ventillation in   ICU    Thank you,  [email or web address]  office hours  m-f 7-3  Options provided:  -- Sepsis present on admission due to COVID-19 infection  -- Covid-19 infection without sepsis  -- Other - I will add my own diagnosis  -- Disagree - Not applicable / Not valid  -- Disagree - Clinically unable to determine / Unknown  -- Refer to Clinical Documentation Reviewer    PROVIDER RESPONSE TEXT:    This patient has sepsis which was present on admission due to COVID-19   infection.     Query created by: Sarah Reed on 2023 2:17 PM      Electronically signed by:  Joselito eRes 2023 3:11 PM

## 2023-12-21 NOTE — PROGRESS NOTES
Critical Care Team - Daily Progress Note      Date and time: 12/21/2023 7:46 AM  Patient's name:  Wayne Shelley  Medical Record Number: [id number]  Patient's account/billing number: [de-identified]  Patient's YOB: 1965  Age: 63 y.o. Date of Admission: 12/17/2023 11:55 PM  Length of stay during current admission: 3      Primary Care Physician: Andrew Sharma MD  ICU Attending Physician: Dr. Jake Ford Status: Full Code    Reason for ICU admission:   Chief Complaint   Patient presents with    Altered Mental Status         SUBJECTIVE:     OVERNIGHT EVENTS:       No acute events overnight    Interval history:  -Patient intubated and sedated  -Hemodynamically stable with blood pressure 154/89 mmHg with MAP of 107.  -Currently sedated on 100 mcg/h fentanyl, 20 mcg/kg/min propofol. Versed drip weaned off.  -On ventilator settings rate set 16, tidal volume 500, PEEP of 12, FiO2 90 increased from 85  -Latest ABG showed pH 7.446, pCO2 50.4, pO2 60.1, and HCO3 34.7.  -Labs show sodium 142, potassium 4.4, chloride 104, bicarbonate 33, BUN 21, creatinine 0.7, GFR 60, glucose 269, CRP 47.6 decreased from 81.8, WBC 5.8, hemoglobin 10.4.  -Urine output 2.6 L / 24 hours. I/os - +1.8 L.  +972 mL since admit. Patient on 40 mg IV Lasix daily. -COVID-positive. Infectious disease recommended remdesivir initiated on 12/18/2023 and Decadron initiated on same day. 6-day Decadron dose. Isolation until 12/27/2023. -LTME  suggests moderate to severe encephalopathy but no epileptiform activity as of now. Unable to obtain MRI given his body habitus. HPI:  Wayne Shelley is a 63 y.o. male who presents to the ED as a transfer from [place] after he was found to be unresponsive at the [facility], he does have a history of drug use and was given Narcan with no response, and had to be intubated for airway protection. EMS also reports that he had upper extremity tremors concerning for seizure activity.       urine failure (HCC)  Resolved Problems:    * No resolved hospital problems. *         PLAN:     PLAN/MEDICAL DECISION MAKING:  Neurologic:   Neuro intact  Neuro checks per protocol  Propofol and fentanyl  LTME  Neuro critical care following  Cardiovascular:  Hemodynamically stable  MAP goal >65  No pressors  Pulmonary:  Maintain oxygen sats >92%  Pulmonary toilet  Vent Information  Ventilator ID: [id number]  Equipment Changed: Expiratory Filter  Ventilator Initiate: Yes  Vent Mode: AC/PRVC  GI/Nutrition  GlycoLax as needed  Ulcer Prophylaxis: PPI not indicated  Diet:Diet NPO  ADULT TUBE FEEDING; Orogastric; Peptide Based High Protein; Continuous; 10; Yes; 10; Q 4 hours; 60; 50; Q 4 hours  Renal/Fluid/Electrolyte  IV Fluids: Stopped  I/O: In: 4557.4 [I.V.:2941.4; NG/GT:1366]  Out: 2660 [Urine:2660]  UOP: 2.8 L / 24 hours  Monitor electrolytes, replace PRN   Lasix 20 mg daily  ID  WBC:   Lab Results   Component Value Date    WBC 5.8 2023     Tmax: Temp (24hrs), Av.5 °F (37.5 °C), Min:99.1 °F (37.3 °C), Max:100.2 °F (37.9 °C)    Antimicrobials: Ceftriaxone  COVID-positive.  On remdesivir and Decadron.  ID following.  Isolation till 2023  Hematology:  Recent Labs     23  0505 23  0351 23  0409   HGB 10.3* 10.3* 10.4*      stable  Endocrine:   glucose controlled - most recent BGL is   Recent Labs     23  0505 23  0351 23  0409   GLUCOSE 193* 202* 269*       DVT Prophylaxis  lovenox  Discharge Needs:  PT, OT, ST, SW, and Case Management      CODE STATUS: Full Code       Forest Mitchell MD  Internal Medicine Resident, PGY-2  [facility]; [place], [place]  2023,7:46 AM       Attending Physician Statement  I have discussed the care of Johnny Dozier, including pertinent history and exam findings with the resident. I have reviewed the key elements of all parts of the encounter with the resident. I have seen and examined the patient with the resident.  I

## 2023-12-22 NOTE — PLAN OF CARE
Problem: Respiratory - Adult  Goal: Achieves optimal ventilation and oxygenation  12/22/2023 0938 by Darcy Chase RCP  Outcome: Progressing  12/22/2023 0356 by Arnoldo Figueredo RN  Outcome: Progressing   BRONCHOSPASM/BRONCHOCONSTRICTION     [x]         IMPROVE AERATION/BREATH SOUNDS  [x]   ADMINISTER BRONCHODILATOR THERAPY AS APPROPRIATE  [x]   ASSESS BREATH SOUNDS  []   IMPLEMENT AEROSOL/MDI PROTOCOL  [x]   PATIENT EDUCATION AS NEEDED

## 2023-12-22 NOTE — PROGRESS NOTES
Critical Care Team - Daily Progress Note      Date and time: 12/22/2023 7:46 AM  Patient's name:  Vik Garcia  Medical Record Number: [id number]  Patient's account/billing number: [de-identified]  Patient's YOB: 1965  Age: 63 y.o. Date of Admission: 12/17/2023 11:55 PM  Length of stay during current admission: 4      Primary Care Physician: Sharath Yin MD  ICU Attending Physician: Dr. Mario Tripp Status: Full Code    Reason for ICU admission:   Chief Complaint   Patient presents with    Altered Mental Status         SUBJECTIVE:     OVERNIGHT EVENTS:       No acute events overnight    Interval history:  -Patient intubated and sedated  -Hemodynamically stable with blood pressure 154/89 mmHg with MAP of 107.  -Currently sedated on 100 mcg/h fentanyl, 20 mcg/kg/min propofol. Versed drip weaned off.  -On ventilator settings rate set 16, tidal volume 500, PEEP of 12, FiO2 90 increased from 85  -Latest ABG showed pH 7.446, pCO2 50.4, pO2 60.1, and HCO3 34.7.  -Labs show sodium 142, potassium 4.4, chloride 104, bicarbonate 33, BUN 21, creatinine 0.7, GFR 60, glucose 269, CRP 47.6 decreased from 81.8, WBC 5.8, hemoglobin 10.4.  -Urine output 2.6 L / 24 hours. I/os - +1.8 L.  +972 mL since admit. Patient on 40 mg IV Lasix daily. -COVID-positive. Infectious disease recommended remdesivir initiated on 12/18/2023 and Decadron initiated on same day. 6-day Decadron dose. Isolation until 12/27/2023. -LTME  suggests moderate to severe encephalopathy but no epileptiform activity as of now. Unable to obtain MRI given his body habitus. HPI:  Vik Gacria is a 63 y.o. male who presents to the ED as a transfer from [place] after [facility], he does have a history of drug use and was given Narcan with no response, and had to be intubated for airway protection. EMS also reports that he had upper extremity tremors concerning for seizure activity.       urine

## 2023-12-22 NOTE — PLAN OF CARE
Problem: Safety - Medical Restraint  Goal: Remains free of injury from restraints (Restraint for Interference with Medical Device)  Description: INTERVENTIONS:  1. Determine that other, less restrictive measures have been tried or would not be effective before applying the restraint  2. Evaluate the patient's condition at the time of restraint application  3. Inform patient/family regarding the reason for restraint  4. Q2H: Monitor safety, psychosocial status, comfort, nutrition and hydration  12/22/2023 1026 by Lluvia Obando RN  Outcome: Progressing  Flowsheets  Taken 12/22/2023 1000 by Lluvia Obando RN  Remains free of injury from restraints (restraint for interference with medical device): Every 2 hours: Monitor safety, psychosocial status, comfort, nutrition and hydration  Taken 12/22/2023 0800 by Lluvia Obando RN  Remains free of injury from restraints (restraint for interference with medical device): Every 2 hours: Monitor safety, psychosocial status, comfort, nutrition and hydration  Taken 12/22/2023 0600 by Carlos Vega RN  Remains free of injury from restraints (restraint for interference with medical device): Every 2 hours: Monitor safety, psychosocial status, comfort, nutrition and hydration  Taken 12/22/2023 0400 by Carlos Vega RN  Remains free of injury from restraints (restraint for interference with medical device): Every 2 hours: Monitor safety, psychosocial status, comfort, nutrition and hydration     Problem: Discharge Planning  Goal: Discharge to home or other facility with appropriate resources  12/22/2023 1026 by Lluvia Obando RN  Outcome: Progressing     Problem: Skin/Tissue Integrity  Goal: Absence of new skin breakdown  Description: 1. Monitor for areas of redness and/or skin breakdown  2. Assess vascular access sites hourly  3. Every 4-6 hours minimum:  Change oxygen saturation probe site  4.   Every 4-6 hours:  If on nasal continuous positive airway pressure,

## 2023-12-22 NOTE — CARE COORDINATION
Transitional planning. Spoke to [employer], She confirmed that pt is a long term resident and can return when dc'd.     1605 I/S FiO2 85%, PEEP of 14, LTME, OG for TF, follows commands. [facility] is [facility], referral faxed. Spoke to pt's daughter, Ibrahima Mccormick provided her information on LTACH choices over the phone as requested. She asked that referral be placed to [facility] , informed [facility] Liaison.

## 2023-12-22 NOTE — PROGRESS NOTES
not advance d/t weight; ORDERING SYSTEM PROVIDED HISTORY: Covid +, AMS, intubated, r/o PE TECHNOLOGIST PROVIDED HISTORY: Covid +, AMS, intubated, r/o PE Additional Contrast?->1 Reason for Exam: best images per pt condition, table will not advance d/t weight UNENHANCED CT HEAD: Evidence of prominent streak artifacts including motion induced artifacts and streak artifacts from metallic external object projected over brain. BRAIN/VENTRICLES: There is no acute intracranial hemorrhage, mass effect or midline shift. No abnormal extra-axial fluid collection. The gray-white differentiation is obscured significantly due to streak artifacts. Cerebral lateral ventricles appears to be narrower than on the previous CT scan on 05/22/2015, and, therefore suspicious for cerebral edema. There is no midline shift. ORBITS: The visualized portion of the orbits demonstrate no acute abnormality. SINUSES: Evidence of marked mucosal thickening in the left maxillary sinus and moderate mucosal thickening in the posterior portion right maxillary sinus. Most of the ethmoidal sinuses are opacified with mucosal thickening. Moderate mucosal thickenings are noted in the bilateral sphenoidal sinuses and mild mucosal thickening is present in bilateral frontal sinuses. SOFT TISSUES/SKULL:  No acute abnormality of the visualized skull or soft tissues. CTA OF CHEST: MEDIASTINUM: No definable hemorrhage or acute process in the mediastinum. The patient has been intubated. Evidence of previous CABG. Thoracic aorta is of normal size. Thoracic aorta is not optimally opacified due to timing of contrast enhancement. There is no obvious dissection in the thoracic aorta. No evidence of pericardial effusion or pericardial thickening. PULMONARY ARTERIES: No evidence of pulmonary embolism. The peripheral basilar tiny branches of pulmonary arteries are not well visualized due to presence of atelectatic changes/infiltrates in the bases of the lungs.  LUNGS     Unremarkable limited KUB. NG tube tip projects at the left upper quadrant, overlying the expected location of the stomach.     XR ABDOMEN (KUB) (SINGLE AP VIEW)    Result Date: 12/17/2023  Examination:XR ABDOMEN (KUB) (SINGLE AP VIEW) INDICATION:Confirm nasogastric tube placed COMPARISON:None. TECHNIQUE:3 views of the upper abdomen are submitted. FINDINGS:This is a very limited examination of the upper abdomen for evaluation of nasogastric tube. A nasogastric tube is in place with the distal tip projecting over the mid left upper quadrant likely in the stomach. There is adequate positioning based on this exam. The visualized chest with better evaluated on a chest x-ray from the same day.     Adequate placement of nasogastric tube as above.     XR CHEST PORTABLE    Result Date: 12/17/2023  EXAM: XR CHEST PORTABLE HISTORY: Post intubation COMPARISON: Portable chest from 10/6/2022. TECHNIQUE: Portable chest was done at 8:03 PM. FINDINGS: Sternal wires and mediastinal clips are noted. Feeding tube is noted difficult to follow due to light technique. Tip of an ET tube is directed to the right and is at the level of the farhana. Heart size is mildly prominent. There is moderate haziness and prominence in interstitial markings noted bilaterally. There may be minimal fluid and atelectasis in the lung bases. No pneumothorax is noted.     1. ET tube is directed to the right chest noted at the level of the farhana. Recommend that this be pulled back 1.5 cm and reevaluated. 2. Stable cardiomegaly. 3. Findings consistent with moderate diffuse pulmonary edema.       Medical Decision Cxjeea-Pkgenunp-Hfqof:     Results       Procedure Component Value Units Date/Time    Culture, Blood 1 [[id number]] Collected: 12/18/23 0513    Order Status: Completed Specimen: Blood Updated: 12/22/23 0528     Specimen Description .BLOOD     Special Requests RIGHT HAND 1ML     Culture NO GROWTH 4 DAYS    Culture, Blood 2 [[id number]] Collected:  Reaction (PCR) results called to and read back by: CEZAR WOMACK AT 1711 ON 12.18.2023    Culture, Blood 1 [[id number]] Collected: 12/18/23 0044    Order Status: Completed Specimen: Blood Updated: 12/22/23 0122     Specimen Description .BLOOD     Special Requests R AC 2ML     Culture NO GROWTH 4 DAYS    Culture, Urine [[id number]] Collected: 12/18/23 0038    Order Status: Completed Specimen: Urine, clean catch Updated: 12/19/23 0647     Specimen Description .CLEAN CATCH URINE     Culture NO SIGNIFICANT GROWTH    COVID-19, Rapid [[id number]]  (Abnormal) Collected: 12/17/23 2030    Order Status: Completed Specimen: Nasopharyngeal Swab Updated: 12/17/23 2041     Specimen Description .NASOPHARYNGEAL SWAB     SARS-CoV-2, Rapid DETECTED     Comment:       Rapid NAAT: The specimen is POSITIVE for SARS-Cov-2, the novel coronavirus associated with   COVID-19.        This test has been authorized by the FDA under an Emergency Use Authorization (EUA) for use   by authorized laboratories.        The ID NOW COVID-19 assay is designed to detect the virus that causes COVID-19 in patients   with signs and symptoms of infection who are suspected of COVID-19.  An individual without symptoms of COVID-19 and who is not shedding SARS-CoV-2 virus would   expect to have a negative (not detected) result in this assay.  Fact sheet for Healthcare Providers: https://www.fda.gov/media/994566/download  Fact sheet for Patients: https://www.fda.gov/media/994033/download        Methodology: Isothermal Nucleic Acid Amplification        Results reported to the appropriate Health Department                   Medical Decision Making-Other:     Note:      MD Neetu Stafford APRN    ATTESTATION:    I have discussed the case, including pertinent history and exam findings with the APRN. I have evaluated the  History, physical findings and pictures of the patient and the key elements of the encounter have been performed by me. I have

## 2023-12-22 NOTE — PLAN OF CARE
Problem: Safety - Medical Restraint  Goal: Remains free of injury from restraints (Restraint for Interference with Medical Device)  Description: INTERVENTIONS:  1. Determine that other, less restrictive measures have been tried or would not be effective before applying the restraint  2. Evaluate the patient's condition at the time of restraint application  3. Inform patient/family regarding the reason for restraint  4.  Q2H: Monitor safety, psychosocial status, comfort, nutrition and hydration  Outcome: Progressing  Flowsheets  Taken 12/22/2023 0200 by Magda Dugan RN  Remains free of injury from restraints (restraint for interference with medical device): Every 2 hours: Monitor safety, psychosocial status, comfort, nutrition and hydration  Taken 12/22/2023 0000 by Magda Dugan RN  Remains free of injury from restraints (restraint for interference with medical device): Every 2 hours: Monitor safety, psychosocial status, comfort, nutrition and hydration  Taken 12/21/2023 2200 by Magda Dugan RN  Remains free of injury from restraints (restraint for interference with medical device): Every 2 hours: Monitor safety, psychosocial status, comfort, nutrition and hydration  Taken 12/21/2023 2000 by Magda Dugan RN  Remains free of injury from restraints (restraint for interference with medical device): Every 2 hours: Monitor safety, psychosocial status, comfort, nutrition and hydration  Taken 12/21/2023 1812 by Nataly Gilman RN  Remains free of injury from restraints (restraint for interference with medical device): Every 2 hours: Monitor safety, psychosocial status, comfort, nutrition and hydration  Taken 12/21/2023 1800 by Nataly Gilman RN  Remains free of injury from restraints (restraint for interference with medical device): Every 2 hours: Monitor safety, psychosocial status, comfort, nutrition and hydration  Taken 12/21/2023 1600 by Nataly Gilman RN  Remains free of injury from restraints (restraint for interference with medical device): Every 2 hours: Monitor safety, psychosocial status, comfort, nutrition and hydration  Taken 12/21/2023 1400 by Jose Ordonez RN  Remains free of injury from restraints (restraint for interference with medical device): Every 2 hours: Monitor safety, psychosocial status, comfort, nutrition and hydration     Problem: Discharge Planning  Goal: Discharge to home or other facility with appropriate resources  Outcome: Progressing     Problem: Pain  Goal: Verbalizes/displays adequate comfort level or baseline comfort level  Outcome: Progressing  Flowsheets (Taken 12/21/2023 1600 by Jose Ordonez RN)  Verbalizes/displays adequate comfort level or baseline comfort level: Assess pain using appropriate pain scale     Problem: Respiratory - Adult  Goal: Achieves optimal ventilation and oxygenation  Outcome: Progressing     Problem: Skin/Tissue Integrity  Goal: Absence of new skin breakdown  Description: 1.  Monitor for areas of redness and/or skin breakdown  2.  Assess vascular access sites hourly  3.  Every 4-6 hours minimum:  Change oxygen saturation probe site  4.  Every 4-6 hours:  If on nasal continuous positive airway pressure, respiratory therapy assess nares and determine need for appliance change or resting period.  Outcome: Progressing     Problem: Safety - Adult  Goal: Free from fall injury  Outcome: Progressing

## 2023-12-22 NOTE — PROGRESS NOTES
NEUROLOGY INPATIENT PROGRESS NOTE    12/22/2023         Current Exam:     Chart reviewed. Discussed with RN. Patient remains on Propofol and Fentanyl, nursing had to increase doses today. This morning on sedation holiday he was able to move all limbs, follow commands. At time of exam he does open eyes, moves LUE easily, wiggles R toes, but has lesser movement to his right arm. Brief History:    Chance Ewing is a  63 y.o. male with H/O CAD, COPD, HTN, prior NEO in 2010 due to MRSA infection and sepsis with acute renal failure requiring short-term hemodialysis now recovered, prior substance abuse, who was admitted on 12/17/2023 from his skilled nursing facility where he resides with altered mental status and acute respiratory failure in the setting of COVID-19 infection and pneumonia. He lives in a nursing facility due to multiple comorbid chronic medical conditions as well as morbid obesity, unable to care for himself. He has a left lower extremity amputation. He was admitted to the medical ICU. Initial CT head limited images but concerning for anoxic brain injury with loss of gray-white differentiation and increased cerebral edema compared to prior CT imaging raising the concern for anoxic brain injury. He is unable to get MRI due to body habitus. 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 Over the course of his stay his neurologic status improved and he was able to follow 1 and two-step commands, moving all 4 limbs with no focal neurologic deficit noted. Repeat brain imaging not yet completed due to increased oxygen demands. No current facility-administered medications on file prior to encounter.      Current Outpatient Medications on File Prior to Encounter   Medication Sig Dispense Refill    meloxicam (MOBIC) 15 MG tablet       potassium chloride (KLOR-CON M) 20 MEQ TBCR extended release tablet       triamcinolone (KENALOG) 0.1 % ointment       ipratropium-albuterol (DUONEB) 0.5-2.5 (3) MG/3ML SOLN nebulizer solution       metoprolol tartrate (LOPRESSOR) 25 MG tablet       furosemide (LASIX) 40 MG tablet       triamterene-hydroCHLOROthiazide (MAXZIDE-25) 37.5-25 MG per tablet Take 1 tablet by mouth daily      white petrolatum-corn starch-lanolin (TRIPLE PASTE) 12.8 % ointment Apply topically 2 times daily      naloxone 4 MG/0.1ML LIQD nasal spray 4 mg as needed      doxycycline hyclate (VIBRAMYCIN) 100 MG capsule       atenolol (TENORMIN) 100 MG tablet Take 100 mg by mouth daily      furosemide (LASIX) 80 MG tablet Take 80 mg by mouth daily      cephALEXin (KEFLEX) 500 MG capsule Take 1 capsule by mouth 4 times daily 28 capsule 0    clotrimazole (LOTRIMIN) 1 % cream APPLY TOPICALLY TWICE DAILY 60 g 1    lisinopril (PRINIVIL;ZESTRIL) 5 MG tablet Take 1 tablet by mouth daily 30 tablet 3    fenofibrate (TRIGLIDE) 160 MG tablet TAKE 1 TABLET BY MOUTH EVERY DAY 90 tablet 1    albuterol (PROVENTIL) (2.5 MG/3ML) 0.083% nebulizer solution Take 3 mLs by nebulization every 4 hours as needed for Wheezing or Shortness of Breath 120 each 11    aspirin 81 MG EC tablet Take 1 tablet by mouth daily 30 tablet 3    metoprolol tartrate (LOPRESSOR) 50 MG tablet Take 1.5 tablets by mouth 2 times daily 90 tablet 5    albuterol sulfate HFA (VENTOLIN HFA) 108 (90 Base) MCG/ACT inhaler Inhale 2 puffs into the lungs every 6 hours as needed for Wheezing 1 Inhaler 3    furosemide (LASIX) 20 MG tablet Take 1 tablet by mouth every other day 15 tablet 5    potassium chloride (KLOR-CON M) 10 MEQ extended release tablet Take 1 tablet by mouth every other day 15 tablet 5    triamterene-hydroCHLOROthiazide (MAXZIDE-25) 37.5-25 MG per tablet TAKE 1 TABLET BY MOUTH EVERY DAY 90 tablet 1    buprenorphine-naloxone (SUBOXONE) 8-2 MG SUBL SL tablet DISSOLVE 1 (ONE) UNDER THE TONGUE EVERY 12 HOURS         Allergies: Johnny Dozier has No Known Allergies.    Past

## 2023-12-23 NOTE — PROGRESS NOTES
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 Keep compression with an ACE wrap and elevate. Follow-up on culture results. Blood culture with 1/2 coag neg staph-likely contamination  Covid Rx:    Remdesivir: Initiated 12/18/23. 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 Examined and discussed with patient. Unable to discuss. Sedated on ventilator. bases of the lungs. LUNGS AND PLEURAL SPACES: Evidence of mild atelectatic changes, and/or infiltrates in the posterior portions of lower lobes of both lungs, right more than left.  Mild atelectatic changes are also present in the posterior segment right pulmonary upper lobe and in the lingula of left lung.  Evidence of mild-to-moderate pulmonary vascular congestion.  Interstitial pulmonary edema is suspected. No evidence of pneumothorax or pneumomediastinum. No obvious pleural effusion.  Probable minimal pleural thickening at the posterior aspect of base of the lungs. BONY THORAX: No definable fracture in bilateral ribs or in the thoracic spine.  Mild to moderate multilevel spondylosis in the thoracic spine without significant stenosis. UPPER ABDOMEN: No definable acute process in visualized upper abdomen.  In the visualized upper and midportion of liver and spleen there is no focal abnormality.  In the visualized upper pole of right kidney there is a large cyst, measuring 10.6 cm, as also noted on previous CT scan of chest on 04/29/2021.  Rest of the right kidney is not visualized.  Upper pole of left kidney is unremarkable.  Tail and body of pancreas are unremarkable.  In the gallbladder there is single small stone demonstrated.     CT scan of the head is significantly limited due to prominent streak artifacts, due to patient's motions and also due to external metallic object. No obvious intracranial hemorrhage identified.  The cerebral lateral ventricles appears to be smaller in size as compared to previous CT scan of the head in 2015.  Therefore there is suspicion for diffuse cerebral edema. There is no midline shift. No diagnostic finding in calvarium. Evidence of diffuse sinusitis involving bilateral maxillary sinuses and ethmoidal sinuses.  Milder sinusitis in the bilateral frontal and sphenoidal sinuses. On the CT of chest, there is no evidence of pulmonary embolism. No definable thoracic aortic aneurysm or

## 2023-12-23 NOTE — PROGRESS NOTES
Critical Care Team - Daily Progress Note      Date and time: 12/23/2023 8:40 AM  Patient's name:  Gonzalo Yeung  Medical Record Number: [id number]  Patient's account/billing number: [de-identified]  Patient's YOB: 1965  Age: 63 y.o. Date of Admission: 12/17/2023 11:55 PM  Length of stay during current admission: 5      Primary Care Physician: Rodney Morris MD  ICU Attending Physician: Dr. Oliva Fine Status: Full Code    Reason for ICU admission:   Chief Complaint   Patient presents with    Altered Mental Status         SUBJECTIVE:     OVERNIGHT EVENTS:       No acute events overnight    Interval history:  -Patient intubated and sedated  -Hemodynamically stable with blood pressure 154/89 mmHg with MAP of 107.  -Currently sedated on 100 mcg/h fentanyl, 20 mcg/kg/min propofol. Versed drip weaned off.  -On ventilator settings rate set 16, tidal volume 500, PEEP of 12, FiO2 90 increased from 85  -Latest ABG showed pH 7.446, pCO2 50.4, pO2 60.1, and HCO3 34.7.  -Labs show sodium 142, potassium 4.4, chloride 104, bicarbonate 33, BUN 21, creatinine 0.7, GFR 60, glucose 269, CRP 47.6 decreased from 81.8, WBC 5.8, hemoglobin 10.4.  -Urine output 2.6 L / 24 hours. I/os - +1.8 L.  +972 mL since admit. Patient on 40 mg IV Lasix daily. -COVID-positive. Infectious disease recommended remdesivir initiated on 12/18/2023 and Decadron initiated on same day. 6-day Decadron dose. Isolation until 12/27/2023. -LTME  suggests moderate to severe encephalopathy but no epileptiform activity as of now. Unable to obtain MRI given his body habitus. HPI:  Gonzalo Yeung is a 63 y.o. male who presents to the ED as a transfer from [place] after he was found to be unresponsive at the [facility], he does have a history of drug use and was given Narcan with no response, and had to be intubated for airway protection. EMS also reports that he had upper extremity tremors concerning for seizure activity.       urine finding in calvarium.      Evidence of diffuse sinusitis involving bilateral maxillary sinuses and   ethmoidal sinuses.  Milder sinusitis in the bilateral frontal and sphenoidal   sinuses.      On the CT of chest, there is no evidence of pulmonary embolism.      No definable thoracic aortic aneurysm or dissection.  Evidence of previous   CABG.  No acute process in the mediastinum.      Mild-to-moderate atelectatic changes, and/or infiltrate in the right   pulmonary lower lobe.  Mild atelectatic changes, and/or infiltrates in the   left pulmonary lower lobe, posterior portion of base of right pulmonary upper   lobe and less obvious in the lingula of left lung.      Mild-to-moderate pulmonary vascular congestion.  Suspected interstitial   pulmonary edema in lungs.      No pneumothorax or pneumomediastinum.         XR CHEST PORTABLE   Final Result   1. Endotracheal tube terminating 2.3 cm above the farhana.   2. Cardiomegaly with unchanged moderate pulmonary vascular congestion.         XR ABDOMEN FOR NG/OG/NE TUBE PLACEMENT   Final Result   Unremarkable limited KUB.      NG tube tip projects at the left upper quadrant, overlying the expected   location of the stomach.         XR CHEST PORTABLE    (Results Pending)        ASSESSMENT:     Principal Problem:    Acute on chronic respiratory failure with hypoxia (HCC)  Active Problems:    COVID-19    Acute respiratory failure with hypoxia and hypercapnia (HCC)    Action induced myoclonus    Hypercarbic cerebral edema (HCC)    Observed seizure-like activity (HCC)    Toxic metabolic encephalopathy    Obesity hypoventilation syndrome (HCC)    Hypotension due to hypovolemia    Acute on chronic respiratory failure with hypoxia and hypercapnia (HCC)    Unvaccinated for covid-19    Elephantiasis    Morbid obesity (HCC)    Acute hypoxic respiratory failure (HCC)  Resolved Problems:    * No resolved hospital problems. *         PLAN:     PLAN/MEDICAL DECISION MAKING:  Neurologic:

## 2023-12-23 NOTE — PLAN OF CARE
Problem: Safety - Medical Restraint  Goal: Remains free of injury from restraints (Restraint for Interference with Medical Device)  Description: INTERVENTIONS:  1. Determine that other, less restrictive measures have been tried or would not be effective before applying the restraint  2. Evaluate the patient's condition at the time of restraint application  3. Inform patient/family regarding the reason for restraint  4.  Q2H: Monitor safety, psychosocial status, comfort, nutrition and hydration  Outcome: Progressing  Flowsheets  Taken 12/23/2023 0000 by Ciro Galeas RN  Remains free of injury from restraints (restraint for interference with medical device): Every 2 hours: Monitor safety, psychosocial status, comfort, nutrition and hydration  Taken 12/22/2023 2200 by Ciro Galeas RN  Remains free of injury from restraints (restraint for interference with medical device): Every 2 hours: Monitor safety, psychosocial status, comfort, nutrition and hydration  Taken 12/22/2023 2000 by Ciro Galeas RN  Remains free of injury from restraints (restraint for interference with medical device): Every 2 hours: Monitor safety, psychosocial status, comfort, nutrition and hydration  Taken 12/22/2023 1800 by Herberth Mora RN  Remains free of injury from restraints (restraint for interference with medical device): Every 2 hours: Monitor safety, psychosocial status, comfort, nutrition and hydration  Taken 12/22/2023 1600 by Herberth Mora RN  Remains free of injury from restraints (restraint for interference with medical device): Every 2 hours: Monitor safety, psychosocial status, comfort, nutrition and hydration  Taken 12/22/2023 1518 by Herberth Mora RN  Remains free of injury from restraints (restraint for interference with medical device): Every 2 hours: Monitor safety, psychosocial status, comfort, nutrition and hydration  Taken 12/22/2023 1400 by Herberth Mora RN  Remains free of injury from restraints

## 2023-12-23 NOTE — PLAN OF CARE
Problem: Skin/Tissue Integrity  Goal: Absence of new skin breakdown  Description: 1. Monitor for areas of redness and/or skin breakdown  2. Assess vascular access sites hourly  3. Every 4-6 hours minimum:  Change oxygen saturation probe site  4. Every 4-6 hours:  If on nasal continuous positive airway pressure, respiratory therapy assess nares and determine need for appliance change or resting period.   12/23/2023 0819 by Ariel Kearney RCP  Outcome: Progressing  12/23/2023 0156 by Sebastián Hines RN  Outcome: Progressing     Problem: Respiratory - Adult  Goal: Achieves optimal ventilation and oxygenation  12/23/2023 0820 by Ariel Kearney RCP  Outcome: Progressing  Flowsheets (Taken 12/19/2023 2032 by Teri Stevens RCP)  Achieves optimal ventilation and oxygenation:   Respiratory therapy support as indicated   Assess for changes in respiratory status   Assess for changes in mentation and behavior   Oxygen supplementation based on oxygen saturation or arterial blood gases  12/23/2023 0156 by Sebastián Hines RN  Outcome: Progressing  12/22/2023 2112 by Liana Way RCP  Outcome: Progressing

## 2023-12-23 NOTE — PROGRESS NOTES
seizures when upper extremity jerking was noted.     A CTA chest was negative for PE, but was concerning for pulmonary edema.     A CT head showed possible cerebral edema as well as sinusitis.     Off sedation, the patient was following commands.     Labwork revealed that the patient was Covid positive. The patient has not received the Covid vaccine.    Abnormal lab-work included:  CK: 864  Myoglobin: 260  BNP: 363  Trop: 31    Drug screen was positive for opiates    Pan cultures are pending    The patient was initiated on IV Vancomycin for concern of possible RLE cellulitis.     ID was consulted for Covid 19 treatment recommendations    CT Chest 12/18/23:  Mild-to-moderate pulmonary vascular congestion.  Suspected interstitial  pulmonary edema in lungs.  No pneumothorax or pneumomediastinum.    CT head 12/18/23:  CT scan of the head is significantly limited due to prominent streak  artifacts, due to patient's motions and also due to external metallic object.  No obvious intracranial hemorrhage identified.  The cerebral lateral  ventricles appears to be smaller in size as compared to previous CT scan of  the head in 2015.  Therefore there is suspicion for diffuse cerebral edema.  There is no midline shift.  No diagnostic finding in calvarium.  Evidence of diffuse sinusitis involving bilateral maxillary sinuses and  ethmoidal sinuses.  Milder sinusitis in the bilateral frontal and sphenoidal  sinuses.      CURRENT EVALUATION : 12/23/2023  /78   Pulse 80   Temp 99.1 °F (37.3 °C)   Resp 22   Ht 1.905 m (6' 3\")   Wt (!) 211.1 kg (465 lb 8 oz)   SpO2 (!) 88%   BMI 58.18 kg/m²     Patient evaluated and examined in the ICU.    Afebrile to low grade fevers  VS stable, HTN    Patient feels better  No complaints  No new issues per RN    Medications reviewed:  Monitor off antibiotics   Completed Decadron and Remdesivir for Covid. Stop date 12-22-23      The patient is on mechanical ventilation with fentanyl and  caution and viewed as a likely skin contaminant. (NOTE) Direct Gram Stain from bottle and Polymerase Chain Reaction (PCR) results called to and read back by: CEZAR Banks [id number] ON 12.18.2023    Culture, Blood 1 [[id number]] Collected: 12/18/23 0044    Order Status: Completed Specimen: Blood Updated: 12/23/23 0126     Specimen Description . BLOOD     Special Requests R AC 2ML     Culture NO GROWTH 5 DAYS    Culture, Urine [[id number]] Collected: 12/18/23 0038    Order Status: Completed Specimen: Urine, clean catch Updated: 12/19/23 0647     Specimen Description . CLEAN CATCH URINE     Culture NO SIGNIFICANT GROWTH    COVID-19, Rapid [[id number]]  (Abnormal) Collected: 12/17/23 2030    Order Status: Completed Specimen: Nasopharyngeal Swab Updated: 12/17/23 2041     Specimen Description . NASOPHARYNGEAL SWAB     SARS-CoV-2, Rapid DETECTED     Comment:       Rapid NAAT: The specimen is POSITIVE for SARS-Cov-2, the novel coronavirus associated with   COVID-19. This test has been authorized by the FDA under an Emergency Use Authorization (EUA) for use   by authorized laboratories. The ID NOW COVID-19 assay is designed to detect the virus that causes COVID-19 in patients   with signs and symptoms of infection who are suspected of COVID-19. An individual without symptoms of COVID-19 and who is not shedding SARS-CoV-2 virus would   expect to have a negative (not detected) result in this assay.   Fact sheet for Healthcare Providers: FindDrives.pl  Fact sheet for Patients: FindDrives.pl        Methodology: Isothermal Nucleic Acid Amplification        Results reported to the appropriate Health Department                   Medical Decision Making-Other:     Note:      Annemarie Montiel MD        Pager: [phone] - Office: [phone]

## 2023-12-23 NOTE — PLAN OF CARE
Problem: Safety - Medical Restraint  Goal: Remains free of injury from restraints (Restraint for Interference with Medical Device)  Description: INTERVENTIONS:  1. Determine that other, less restrictive measures have been tried or would not be effective before applying the restraint  2. Evaluate the patient's condition at the time of restraint application  3. Inform patient/family regarding the reason for restraint  4.  Q2H: Monitor safety, psychosocial status, comfort, nutrition and hydration  12/23/2023 0922 by Humberto Hopkins RN  Outcome: Progressing  Flowsheets  Taken 12/23/2023 0800 by Humberto Hopkins RN  Remains free of injury from restraints (restraint for interference with medical device):   Determine that other, less restrictive measures have been tried or would not be effective before applying the restraint   Evaluate the patient's condition at the time of restraint application   Inform patient/family regarding the reason for restraint   Every 2 hours: Monitor safety, psychosocial status, comfort, nutrition and hydration  Taken 12/23/2023 0600 by Sebastián Hines RN  Remains free of injury from restraints (restraint for interference with medical device): Every 2 hours: Monitor safety, psychosocial status, comfort, nutrition and hydration  Taken 12/23/2023 0400 by Sebastián Hines RN  Remains free of injury from restraints (restraint for interference with medical device): Every 2 hours: Monitor safety, psychosocial status, comfort, nutrition and hydration  Taken 12/23/2023 0200 by Sebastián Hines RN  Remains free of injury from restraints (restraint for interference with medical device): Every 2 hours: Monitor safety, psychosocial status, comfort, nutrition and hydration  12/23/2023 0156 by Sebastián Hines RN  Outcome: Progressing  Flowsheets  Taken 12/23/2023 0000 by Sebastián Hines RN  Remains free of injury from restraints (restraint for interference with medical device): Every 2 hours: Monitor safety, psychosocial RN  Outcome: Progressing  12/23/2023 0156 by Sean Birmingham RN  Outcome: Progressing     Problem: Chronic Conditions and Co-morbidities  Goal: Patient's chronic conditions and co-morbidity symptoms are monitored and maintained or improved  12/23/2023 0922 by Anuradha Bar RN  Outcome: Progressing  12/23/2023 0156 by Sean Birmingham RN  Outcome: Progressing     Problem: Nutrition Deficit:  Goal: Optimize nutritional status  Outcome: Progressing     Problem: Confusion  Goal: Confusion, delirium, dementia, or psychosis is improved or at baseline  Description: INTERVENTIONS:  1. Assess for possible contributors to thought disturbance, including medications, impaired vision or hearing, underlying metabolic abnormalities, dehydration, psychiatric diagnoses, and notify attending LIP  2. [place] high risk fall precautions, as indicated  3. Provide frequent short contacts to provide reality reorientation, refocusing and direction  4. Decrease environmental stimuli, including noise as appropriate  5. Monitor and intervene to maintain adequate nutrition, hydration, elimination, sleep and activity  6. If unable to ensure safety without constant attention obtain sitter and review sitter guidelines with assigned personnel  7. Initiate Psychosocial CNS and Spiritual Care consult, as indicated  Outcome: Progressing

## 2023-12-24 NOTE — PROGRESS NOTES
Overall Financial Resource Strain (CARDIA)     Difficulty of Paying Living Expenses: Not hard at all   Food Insecurity: Patient Unable To Answer (12/18/2023)    Hunger Vital Sign     Worried About Running Out of Food in the Last Year: Patient unable to answer     [address] in the Last Year: Patient unable to answer   Transportation Needs: Patient Unable To Answer (12/18/2023)    [facility] - Transportation     Lack of Transportation (Medical): Patient unable to answer     Lack of Transportation (Non-Medical): Patient unable to answer   Physical Activity: Not on file   Stress: Not on file   Social Connections: Not on file   Intimate Partner Violence: Not on file   Housing Stability: Patient Unable To Answer (12/18/2023)    Housing Stability Vital Sign     Unable to Pay for Housing in the Last Year: Patient unable to answer     Number of Places Lived in the Last Year: 1     Unstable Housing in the Last Year: Patient unable to answer       Family History:     Family History   Problem Relation Age of Onset    Cancer Mother         Allergies:   Patient has no known allergies. Review of Systems:   Patient intubated and sedated  Constitutional: No fevers or chills. No systemic complaints  Head: No headaches  Eyes: No double vision or blurry vision. No conjunctival inflammation. ENT: No sore throat or runny nose. . No hearing loss, tinnitus or vertigo. Cardiovascular: No chest pain or palpitations. No Shortness of breath. No TABOR  Lung: No Shortness of breath or cough. No sputum production  Abdomen: No nausea, vomiting, diarrhea, or abdominal pain. Mariely Taye No cramps. Genitourinary: No increased urinary frequency, or dysuria. No hematuria. No suprapubic or CVA pain  Musculoskeletal: No muscle aches or pains. No joint effusions, swelling or deformities  Hematologic: No bleeding or bruising. Neurologic: No headache, weakness, numbness, or tingling. Integument: No rash, no ulcers. Psychiatric: No depression.    Endocrine: No studies and discussed them with the APRN. I have updated the medical record where necessary.    I agree with the assessment, plan and orders as documented by the APRN.    In addition diagnostic and decision making elements, performed by the Attending Physician, are included in the Diagnostic and Decision Making Section of the text.    Néstor Pryor MD           Pager: [phone] - Office: [phone]

## 2023-12-24 NOTE — PLAN OF CARE
Problem: Respiratory - Adult  Goal: Achieves optimal ventilation and oxygenation  12/24/2023 0932 by Byron Fitzgerald RCP  Outcome: Progressing  12/24/2023 0401 by Sheila Ramírez RCP  Outcome: Progressing  12/23/2023 2242 by Lopez Rubio RN  Outcome: Progressing  Flowsheets (Taken 12/23/2023 2000)  Achieves optimal ventilation and oxygenation:   Assess for changes in respiratory status   Assess the need for suctioning and aspirate as needed

## 2023-12-24 NOTE — PLAN OF CARE
2000)  Verbalizes/displays adequate comfort level or baseline comfort level:   Assess pain using appropriate pain scale   Administer analgesics based on type and severity of pain and evaluate response  12/23/2023 0922 by Anuradha Bar RN  Outcome: Progressing     Problem: Respiratory - Adult  Goal: Achieves optimal ventilation and oxygenation  12/23/2023 2242 by Capri Colvin RN  Outcome: Progressing  Flowsheets (Taken 12/23/2023 2000)  Achieves optimal ventilation and oxygenation:   Assess for changes in respiratory status   Assess the need for suctioning and aspirate as needed  12/23/2023 0922 by Anuradha Bar RN  Outcome: Progressing     Problem: Skin/Tissue Integrity  Goal: Absence of new skin breakdown  Description: 1.  Monitor for areas of redness and/or skin breakdown  2.  Assess vascular access sites hourly  3.  Every 4-6 hours minimum:  Change oxygen saturation probe site  4.  Every 4-6 hours:  If on nasal continuous positive airway pressure, respiratory therapy assess nares and determine need for appliance change or resting period.  12/23/2023 2242 by Capri Colvin RN  Outcome: Progressing  12/23/2023 0922 by Anuradha Bar RN  Outcome: Progressing     Problem: Safety - Adult  Goal: Free from fall injury  12/23/2023 2242 by Capri Colvin RN  Outcome: Progressing  Flowsheets (Taken 12/23/2023 2030)  Free From Fall Injury: Instruct family/caregiver on patient safety  12/23/2023 0922 by Anuradha Bar RN  Outcome: Progressing     Problem: ABCDS Injury Assessment  Goal: Absence of physical injury  12/23/2023 2242 by Capri Colvin RN  Outcome: Progressing  Flowsheets (Taken 12/23/2023 2030)  Absence of Physical Injury: Implement safety measures based on patient assessment  12/23/2023 0922 by Anuradha Bar RN  Outcome: Progressing     Problem: Chronic Conditions and Co-morbidities  Goal: Patient's chronic conditions and co-morbidity symptoms are monitored and maintained or improved  12/23/2023 2242 by  Vandana Gaytan RN  Outcome: Progressing  Flowsheets (Taken 12/23/2023 2000)  Care Plan - Patient's Chronic Conditions and Co-Morbidity Symptoms are Monitored and Maintained or Improved:   Monitor and assess patient's chronic conditions and comorbid symptoms for stability, deterioration, or improvement   Collaborate with multidisciplinary team to address chronic and comorbid conditions and prevent exacerbation or deterioration   Update acute care plan with appropriate goals if chronic or comorbid symptoms are exacerbated and prevent overall improvement and discharge  12/23/2023 0922 by Aspen Marin RN  Outcome: Progressing     Problem: Nutrition Deficit:  Goal: Optimize nutritional status  12/23/2023 2242 by Vandana Gaytan RN  Outcome: Progressing  12/23/2023 0922 by Aspen Marin RN  Outcome: Progressing     Problem: Confusion  Goal: Confusion, delirium, dementia, or psychosis is improved or at baseline  Description: INTERVENTIONS:  1. Assess for possible contributors to thought disturbance, including medications, impaired vision or hearing, underlying metabolic abnormalities, dehydration, psychiatric diagnoses, and notify attending LIP  2. [place] high risk fall precautions, as indicated  3. Provide frequent short contacts to provide reality reorientation, refocusing and direction  4. Decrease environmental stimuli, including noise as appropriate  5. Monitor and intervene to maintain adequate nutrition, hydration, elimination, sleep and activity  6. If unable to ensure safety without constant attention obtain sitter and review sitter guidelines with assigned personnel  7.  Initiate Psychosocial CNS and Spiritual Care consult, as indicated  12/23/2023 2242 by Vandana Gaytan RN  Outcome: Progressing  Flowsheets (Taken 12/23/2023 2000)  Effect of thought disturbance (confusion, delirium, dementia, or psychosis) are managed with adequate functional status: [place] high risk fall precautions, as

## 2023-12-24 NOTE — PLAN OF CARE
Problem: Safety - Medical Restraint  Goal: Remains free of injury from restraints (Restraint for Interference with Medical Device)  Description: INTERVENTIONS:  1. Determine that other, less restrictive measures have been tried or would not be effective before applying the restraint  2. Evaluate the patient's condition at the time of restraint application  3. Inform patient/family regarding the reason for restraint  4. Q2H: Monitor safety, psychosocial status, comfort, nutrition and hydration  Outcome: Not Progressing  Flowsheets (Taken 12/24/2023 0600 by Chely Coe RN)  Remains free of injury from restraints (restraint for interference with medical device): Every 2 hours: Monitor safety, psychosocial status, comfort, nutrition and hydration     Problem: Discharge Planning  Goal: Discharge to home or other facility with appropriate resources  Outcome: Progressing     Problem: Pain  Goal: Verbalizes/displays adequate comfort level or baseline comfort level  Outcome: Progressing     Problem: Respiratory - Adult  Goal: Achieves optimal ventilation and oxygenation  12/24/2023 1827 by Hetal Stein RN  Outcome: Progressing  12/24/2023 0932 by Ruthann Harrell RCP  Outcome: Progressing     Problem: Skin/Tissue Integrity  Goal: Absence of new skin breakdown  Description: 1. Monitor for areas of redness and/or skin breakdown  2. Assess vascular access sites hourly  3. Every 4-6 hours minimum:  Change oxygen saturation probe site  4. Every 4-6 hours:  If on nasal continuous positive airway pressure, respiratory therapy assess nares and determine need for appliance change or resting period.   Outcome: Progressing     Problem: Safety - Adult  Goal: Free from fall injury  Outcome: Progressing     Problem: ABCDS Injury Assessment  Goal: Absence of physical injury  Outcome: Progressing     Problem: Chronic Conditions and Co-morbidities  Goal: Patient's chronic conditions and co-morbidity symptoms are monitored and

## 2023-12-24 NOTE — PROGRESS NOTES
Critical Care Team - Daily Progress Note      Date and time: 2023 7:57 AM  Patient's name:  Dawit Forbes  Medical Record Number: [id number]  Patient's account/billing number: [de-identified]  Patient's YOB: 1965  Age: 63 y.o. Date of Admission: 2023 11:55 PM  Length of stay during current admission: 6      Primary Care Physician: Armond Herman MD  ICU Attending Physician: Dr. Brinton Heimlich Status: Full Code    Reason for ICU admission:   Chief Complaint   Patient presents with    Altered Mental Status         SUBJECTIVE:     OVERNIGHT EVENTS:       No acute events overnight    Ventilator: PRVC/16/500/14/75%  AB.35/75.9/70.5/42.1  Glucose remains elevated at 280s  Increase lantus to 30 BID     HPI:  Dawit Forbes is a 63 y.o. male who presents to the ED as a transfer from [facility] after he was found to be unresponsive at the [facility], he does have a history of drug use and was given Narcan with no response, and had to be intubated for airway protection. EMS also reports that he had upper extremity tremors concerning for seizure activity. urine drug screen was positive for opiates, he also tested positive for COVID. Chest x-ray was concerning for moderate diffuse pulmonary edema. CT of the chest showed no evidence of pulmonary embolism, showed evidence of previous CABG, mild to moderate pulmonary vascular congestion. CT of the head showed showed no obvious intracranial hemorrhage. There is suspicion for diffuse cerebral edema. 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      AWAKE & FOLLOWING COMMANDS:  [] No   [x] Yes    CURRENT VENTILATION STATUS:     [x] Ventilator  [] BIPAP  [] Nasal Cannula [] Room Air        SECRETIONS Amount:  [x] Small [] Moderate  [] Large  []   Unremarkable limited KUB.      NG tube tip projects at the left upper quadrant, overlying the expected   location of the stomach.         XR CHEST PORTABLE    (Results Pending)        ASSESSMENT:     Principal Problem:    Acute on chronic respiratory failure with hypoxia (HCC)  Active Problems:    COVID-19    Acute respiratory failure with hypoxia and hypercapnia (HCC)    Action induced myoclonus    Hypercarbic cerebral edema (HCC)    Observed seizure-like activity (HCC)    Toxic metabolic encephalopathy    Obesity hypoventilation syndrome (HCC)    Hypotension due to hypovolemia    Acute on chronic respiratory failure with hypoxia and hypercapnia (HCC)    Unvaccinated for covid-19    Elephantiasis    Morbid obesity (HCC)    Acute hypoxic respiratory failure (HCC)  Resolved Problems:    * No resolved hospital problems. *         PLAN:     PLAN/MEDICAL DECISION MAKING:  Neurologic:   Neuro checks per protocol  Propofol and fentanyl  LTME shows mild to moderate encephalopathy  Neurology  Cardiovascular:  Hemodynamically stable  MAP goal >65  ECHO shows EF 55-60%  Pulmonary:  Maintain oxygen sats >92%  Duoneb q4h    GI/Nutrition  GlycoLax as needed  Protonix BID  Diet:ADULT TUBE FEEDING; Orogastric; Peptide Based High Protein; Continuous; 10; Yes; 10; Q 4 hours; 60; 50; Q 4 hours  Renal/Fluid/Electrolyte  I/O: In: 3332.7 [I.V.:1241.7; NG/GT:1927]  Out: 4965 [Urine:4965]  Monitor electrolytes, replace PRN   Lasix 40 mg daily  ID  WBC:   Lab Results   Component Value Date    WBC 7.3 2023     Tmax: Temp (24hrs), Av °F (37.2 °C), Min:98.2 °F (36.8 °C), Max:99.5 °F (37.5 °C)    Antimicrobials: None  COVID-positive.  On remdesivir finished  and Decadron also finished .  ID following.  Isolation till 2023  Hematology:  Recent Labs     23  024   HGB 10.6* 11.1*    stable  Endocrine:   glucose controlled - most recent BGL is   Recent Labs     23   GLUCOSE 265*  275*   High dose sliding scale  Lantus nightly  DVT Prophylaxis  lovenox  Discharge Needs:  PT, OT, ST, SW, and Case Management      CODE STATUS: Full Code         Yaya Jordan MD  Internal medicine, PGY-3  [facility]   12/24/2023 7:57 AM      Attending Physician Statement  I have discussed the care of Johnny Dozier, including pertinent history and exam findings with the resident. I have reviewed the key elements of all parts of the encounter with the resident. I have seen and examined the patient with the resident.  I agree with the assessment and plan and status of the problem list as documented.    Saw the during the rounds no overall change.  Remains on ventilator with high setting FiO2 between 70% and 80% as he desaturated to 86% currently he is saturating 88%.  He is on Lasix 40 mg twice daily labs are pending.  Urine output 5 L last 24 hours.  His ABG was 7.3 5/76/70/22.  Ventilator setting PRVC/16/500/14/75% this morning.  Endotracheal secretions reported to be mild.  Continue to be hypercapnic, significant COPD and obesity hypoventilation.  Chest x-ray did not show any change from previous chest x-ray although aeration was better.  He has chronic cor pulmonale tricuspid bioprosthetic valve pulmonary hypertension chronic lymphedema left AKA and severe morbid obesity  Continue current management overall prognosis is guarded with multiple baseline issues.    Discussed with nursing staff, treatment and plan discussed.  Discussed with respiratory therapist.    Total critical care time caring for this patient with life threatening, unstable organ failure, including direct patient contact, management of life support systems, review of data including imaging and labs, discussions with other team members and physicians at least 35  Min so far today, excluding procedures.       Please note that this chart was generated using voice recognition Dragon dictation software. Although  Good

## 2023-12-25 NOTE — PLAN OF CARE
Problem: Respiratory - Adult  Goal: Achieves optimal ventilation and oxygenation  12/24/2023 1946 by Ethelyn Osgood, RCP  Outcome: Progressing      BRONCHOSPASM/BRONCHOCONSTRICTION     [x]         IMPROVE AERATION/BREATH SOUNDS  [x]   ADMINISTER BRONCHODILATOR THERAPY AS APPROPRIATE  [x]   ASSESS BREATH SOUNDS  []   IMPLEMENT AEROSOL/MDI PROTOCOL  [x]   PATIENT EDUCATION AS NEEDED

## 2023-12-25 NOTE — PLAN OF CARE
Problem: Safety - Medical Restraint  Goal: Remains free of injury from restraints (Restraint for Interference with Medical Device)  Description: INTERVENTIONS:  1. Determine that other, less restrictive measures have been tried or would not be effective before applying the restraint  2. Evaluate the patient's condition at the time of restraint application  3. Inform patient/family regarding the reason for restraint  4. Q2H: Monitor safety, psychosocial status, comfort, nutrition and hydration  Outcome: Progressing  Flowsheets  Taken 12/25/2023 0600 by Heather Rutherford RN  Remains free of injury from restraints (restraint for interference with medical device): Every 2 hours: Monitor safety, psychosocial status, comfort, nutrition and hydration  Taken 12/25/2023 0400 by Heather Rutherford RN  Remains free of injury from restraints (restraint for interference with medical device): Every 2 hours: Monitor safety, psychosocial status, comfort, nutrition and hydration     Problem: Discharge Planning  Goal: Discharge to home or other facility with appropriate resources  Outcome: Progressing     Problem: Pain  Goal: Verbalizes/displays adequate comfort level or baseline comfort level  Outcome: Progressing     Problem: Respiratory - Adult  Goal: Achieves optimal ventilation and oxygenation  12/25/2023 1611 by Rhianna Humphreys RN  Outcome: Progressing  12/25/2023 0854 by Giselle Luke RCP  Outcome: Progressing     Problem: Skin/Tissue Integrity  Goal: Absence of new skin breakdown  Description: 1. Monitor for areas of redness and/or skin breakdown  2. Assess vascular access sites hourly  3. Every 4-6 hours minimum:  Change oxygen saturation probe site  4. Every 4-6 hours:  If on nasal continuous positive airway pressure, respiratory therapy assess nares and determine need for appliance change or resting period.   Outcome: Progressing     Problem: Safety - Adult  Goal: Free from fall injury  Outcome: Progressing     Problem:

## 2023-12-25 NOTE — PROGRESS NOTES
Critical Care Team - Daily Progress Note      Date and time: 12/25/2023 8:06 AM  Patient's name:  Shantelle Richard  Medical Record Number: [id number]  Patient's account/billing number: [de-identified]  Patient's YOB: 1965  Age: 63 y.o. Date of Admission: 12/17/2023 11:55 PM  Length of stay during current admission: 7      Primary Care Physician: Lazara Alvarenga MD  ICU Attending Physician: Dr. Aidee Hernandez Status: Full Code    Reason for ICU admission:   Chief Complaint   Patient presents with    Altered Mental Status         SUBJECTIVE:     OVERNIGHT EVENTS:       No acute events overnight    Vent settings: PRVC/16/500/14/90  VBG today shows pH of 7.3, CO2 81.9, O2 25.6, bicarb 47.3  Patient is febrile low-grade 100.4 we will send out blood cultures and check lactic acid. Patient is currently on Zosyn. HPI:  Shantelle Richard is a 63 y.o. male who presents to the ED as a transfer from [place] after he was found to be unresponsive at the [facility], he does have a history of drug use and was given Narcan with no response, and had to be intubated for airway protection. EMS also reports that he had upper extremity tremors concerning for seizure activity. urine drug screen was positive for opiates, he also tested positive for COVID. Chest x-ray was concerning for moderate diffuse pulmonary edema. CT of the chest showed no evidence of pulmonary embolism, showed evidence of previous CABG, mild to moderate pulmonary vascular congestion. CT of the head showed showed no obvious intracranial hemorrhage. There is suspicion for diffuse cerebral edema. 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      AWAKE & FOLLOWING COMMANDS:  [] No   [x] Yes    CURRENT VENTILATION STATUS:     [x] Ventilator  [] BIPAP 23 (!) 87 % --         Intake/Output Summary (Last 24 hours) at 12/25/2023 0806  Last data filed at 12/25/2023 0635  Gross per 24 hour   Intake 3973.64 ml   Output 4660 ml   Net -686.36 ml     Date 12/25/23 0000 - 12/25/23 2359   Shift 8182-3168 9022-0986 8703-8939 24 Hour Total   INTAKE   I.V.(mL/kg) 596.2(3)   596.2(3)   NG/GT(mL/kg) 1484(7.3)   1484(7.3)   IV Piggyback(mL/kg) 68.2(0.3)   68.2(0.3)   Shift Total(mL/kg) 2148.4(10.6)   2148.4(10.6)   OUTPUT   Urine(mL/kg/hr) 725(0.4)   725   Shift Total(mL/kg) 725(3.6)   725(3.6)   Weight (kg) 201.9 201.9 201.9 201.9     Wt Readings from Last 3 Encounters:   12/25/23 (!) 201.9 kg (445 lb 3.2 oz)   12/17/23 (!) 224.1 kg (494 lb)   10/06/22 (!) 201 kg (443 lb 3.2 oz)     Body mass index is 55.65 kg/m².        PHYSICAL EXAM:  Constitutional: Intubated.  Agitated  EENT: PERRLA, EOMI, sclera clear, anicteric, oropharynx clear, no lesions, neck supple with midline trachea.  Neck: Supple, symmetrical, trachea midline, no adenopathy, thyroid symmetric, no jvd skin normal  Respiratory: clear to auscultation, no wheezes or rales and unlabored breathing. No intercostal tenderness  Cardiovascular: regular rate and rhythm, normal S1, S2, no murmur noted and 2+ pulses throughout  Abdomen: soft, nontender, nondistended, no masses or organomegaly  NEUROLOGIC: Intubated and sedated  Extremities: Left AKA.  Right chronic lymphedema.    Any additional physical findings:      MEDICATIONS:  Scheduled Meds:   insulin glargine  30 Units SubCUTAneous BID    insulin lispro  0-16 Units SubCUTAneous Q4H    piperacillin-tazobactam  3,375 mg IntraVENous Q8H    furosemide  40 mg IntraVENous BID    aspirin  81 mg Oral Daily    sodium chloride flush  5-40 mL IntraVENous 2 times per day    enoxaparin  60 mg SubCUTAneous BID    ipratropium 0.5 mg-albuterol 2.5 mg  1 Dose Inhalation Q4H WA RT    pantoprazole (PROTONIX) 40 mg in sodium chloride (PF) 0.9 % 10 mL injection  40 mg IntraVENous Q12H  cultures:                 [] None drawn      [x] Negative             []  Positive (Details:  )  Urine Culture:                   [] None drawn      [x] Negative             []  Positive (Details:  )  Sputum Culture:               [x] None drawn       [] Negative             []  Positive (Details:  )   Endotracheal aspirate:     [x] None drawn       [] Negative             []  Positive (Details:  )     Chest Xray (12/25/2023):  XR CHEST PORTABLE   Final Result   Stable chest radiograph with cardiomegaly and pulmonary vascular congestion   and possible small bilateral pleural effusions with bibasilar   atelectasis/edema.         XR CHEST PORTABLE   Final Result   Pulmonary edema with low lung volumes and bilateral pleural effusions with   bibasilar atelectasis are slightly worse.         XR CHEST PORTABLE   Final Result   Bilateral airspace opacities which progressed at the right lung base in the   left perihilar region.  The left costophrenic angle is now obscured, so a   small pleural effusion is possible.         XR CHEST PORTABLE   Final Result   Cardiomegaly with pulmonary vascular congestion.      Stable appearance of bibasilar atelectasis and/or consolidation.         XR CHEST PORTABLE   Final Result   Mild cardiomegaly with scattered infiltrates.      Support tubes as described above.         CT HEAD WO CONTRAST   Final Result   CT scan of the head is significantly limited due to prominent streak   artifacts, due to patient's motions and also due to external metallic object.   No obvious intracranial hemorrhage identified.  The cerebral lateral   ventricles appears to be smaller in size as compared to previous CT scan of   the head in 2015.  Therefore there is suspicion for diffuse cerebral edema.   There is no midline shift.      No diagnostic finding in calvarium.      Evidence of diffuse sinusitis involving bilateral maxillary sinuses and   ethmoidal sinuses.  Milder sinusitis in the bilateral frontal and

## 2023-12-25 NOTE — PLAN OF CARE
Problem: Safety - Medical Restraint  Goal: Remains free of injury from restraints (Restraint for Interference with Medical Device)  Description: INTERVENTIONS:  1. Determine that other, less restrictive measures have been tried or would not be effective before applying the restraint  2. Evaluate the patient's condition at the time of restraint application  3. Inform patient/family regarding the reason for restraint  4.  Q2H: Monitor safety, psychosocial status, comfort, nutrition and hydration  12/24/2023 2102 by Radha Castro RN  Outcome: Progressing  Flowsheets (Taken 12/24/2023 2000)  Remains free of injury from restraints (restraint for interference with medical device): Every 2 hours: Monitor safety, psychosocial status, comfort, nutrition and hydration  12/24/2023 1827 by Viktoriya Alonzo RN  Outcome: Not Progressing  Flowsheets (Taken 12/24/2023 0600 by Radha Castro RN)  Remains free of injury from restraints (restraint for interference with medical device): Every 2 hours: Monitor safety, psychosocial status, comfort, nutrition and hydration     Problem: Discharge Planning  Goal: Discharge to home or other facility with appropriate resources  12/24/2023 2102 by Radha Castro RN  Outcome: Progressing  Flowsheets (Taken 12/24/2023 2000)  Discharge to home or other facility with appropriate resources: Identify barriers to discharge with patient and caregiver  12/24/2023 1827 by Viktoriya Alonzo RN  Outcome: Progressing     Problem: Pain  Goal: Verbalizes/displays adequate comfort level or baseline comfort level  12/24/2023 2102 by Radha Castro RN  Outcome: Progressing  Flowsheets (Taken 12/24/2023 2000)  Verbalizes/displays adequate comfort level or baseline comfort level:   Assess pain using appropriate pain scale   Administer analgesics based on type and severity of pain and evaluate response  12/24/2023 1827 by Viktoriya Alonzo RN  Outcome: Progressing     Problem: Respiratory - Adult  Goal: Achieves optimal INTERVENTIONS:  1. Determine that other, less restrictive measures have been tried or would not be effective before applying the restraint  2. Evaluate the patient's condition at the time of restraint application  3. Inform patient/family regarding the reason for restraint  4. Q2H: Monitor safety, psychosocial status, comfort, nutrition and hydration  12/24/2023 2102 by Capri Colvin, RN  Outcome: Progressing  Flowsheets (Taken 12/24/2023 2000)  Remains free of injury from restraints (restraint for interference with medical device): Every 2 hours: Monitor safety, psychosocial status, comfort, nutrition and hydration  12/24/2023 1827 by Bosler, Mary, RN  Outcome: Not Progressing  Flowsheets (Taken 12/24/2023 0600 by Capri Clovin, RN)  Remains free of injury from restraints (restraint for interference with medical device): Every 2 hours: Monitor safety, psychosocial status, comfort, nutrition and hydration

## 2023-12-26 NOTE — PLAN OF CARE
Problem: Respiratory - Adult  Goal: Achieves optimal ventilation and oxygenation  12/26/2023 0808 by Umang Webb RCP  Outcome: Progressing  12/26/2023 0420 by Dalia Robles RN  Outcome: Progressing   BRONCHOSPASM/BRONCHOCONSTRICTION     [x]         IMPROVE AERATION/BREATH SOUNDS  [x]   ADMINISTER BRONCHODILATOR THERAPY AS APPROPRIATE  [x]   ASSESS BREATH SOUNDS  []   IMPLEMENT AEROSOL/MDI PROTOCOL  [x]   PATIENT EDUCATION AS NEEDED

## 2023-12-26 NOTE — PROGRESS NOTES
Code status changed to [facility] after conversation with dr Samara Vang and pt's son and daughter in room.

## 2023-12-26 NOTE — PROGRESS NOTES
Comprehensive Nutrition Assessment    Type and Reason for Visit:  Reassess    Nutrition Recommendations/Plan:   Continue current TF of Peptide Based High Protein formula at 60 mL/hr. Monitor TF tolerance/adequacy of intakes and rate of propofol - modify as needed. Will monitor labs, weights, and plan of care. Malnutrition Assessment:  Malnutrition Status:  Insufficient data (12/26/23 6575)    Context:  Acute Illness       Nutrition Assessment:    Pt remains intubated and sedated. Propofol continues at 33.6 mL/hr. Continues to tolerate TF of Peptide Based High Protein formula at goal 60 mL/hr per RN. Noted no recorded BM since admission. Weight fluctuations noted - ? losses due to fluids? Labs reviewed: K 3.3 mmol/L, Cl 97 mmol/L, CO2 42 mmol/L, Glucose 195-208 mg/dL. Meds include: Lasix, Lantus, Humalog SS, Antibiotics, KCl replacement. Nutrition Related Findings:    labs/meds reviewed. no BM since admission. Wound Type: None       Current Nutrition Intake & Therapies:    Average Meal Intake: NPO  Average Supplements Intake: NPO  ADULT TUBE FEEDING; Orogastric; Peptide Based High Protein; Continuous; 10; Yes; 10; Q 4 hours; 60; 500; Q 6 hours  Current Tube Feeding (TF) Orders:  Feeding Route: Orogastric  Formula: Peptide Based High Protein  Schedule: Continuous  Feeding Regimen: 60 mL/hr  Additives/Modulars: None  Water Flushes: 50 mL q 4 hrs or per MD  Current TF & Flush Orders Provides: At 60 mL/hr = 1440 kcals, 126 gm protein  Goal TF & Flush Orders Provides: Peptide Based High Protein at 60 mL/hr = 1440 kcals, 126 gm protein per day    Additional Calorie Sources:  Propofol at 33.6 mL/hr = 887 kcals    Anthropometric Measures:  Height: 190.5 cm (6' 3\")  Ideal Body Weight (IBW): 196 lbs (89 kg)    Admission Body Weight: 224.1 kg (494 lb 0.8 oz)  Current Body Weight: 201.9 kg (445 lb 1.7 oz), 227.1 % IBW.  Weight Source: Bed Scale  Current BMI (kg/m2): 55.6        Weight Adjustment For:

## 2023-12-26 NOTE — PLAN OF CARE
Problem: Safety - Medical Restraint  Goal: Remains free of injury from restraints (Restraint for Interference with Medical Device)  Description: INTERVENTIONS:  1. Determine that other, less restrictive measures have been tried or would not be effective before applying the restraint  2. Evaluate the patient's condition at the time of restraint application  3. Inform patient/family regarding the reason for restraint  4.  Q2H: Monitor safety, psychosocial status, comfort, nutrition and hydration  12/26/2023 1227 by Viet Argueta RN  Outcome: Progressing  Flowsheets  Taken 12/26/2023 1200 by Viet Argueta RN  Remains free of injury from restraints (restraint for interference with medical device): Every 2 hours: Monitor safety, psychosocial status, comfort, nutrition and hydration  Taken 12/26/2023 0800 by Viet Argueta RN  Remains free of injury from restraints (restraint for interference with medical device): Every 2 hours: Monitor safety, psychosocial status, comfort, nutrition and hydration  Taken 12/26/2023 0600 by Ina Burch RN  Remains free of injury from restraints (restraint for interference with medical device): Every 2 hours: Monitor safety, psychosocial status, comfort, nutrition and hydration  12/26/2023 0420 by Jeromy Carey RN  Outcome: Progressing  Flowsheets  Taken 12/26/2023 0200  Remains free of injury from restraints (restraint for interference with medical device): Every 2 hours: Monitor safety, psychosocial status, comfort, nutrition and hydration  Taken 12/26/2023 0000  Remains free of injury from restraints (restraint for interference with medical device): Every 2 hours: Monitor safety, psychosocial status, comfort, nutrition and hydration  Taken 12/25/2023 2200  Remains free of injury from restraints (restraint for interference with medical device): Every 2 hours: Monitor safety, psychosocial status, comfort, nutrition and hydration  Taken 12/25/2023 2000  Remains free of

## 2023-12-26 NOTE — CODE DOCUMENTATION
I had a long discussion with the patient's family in person, in presence of the RN taking care of the patient. Patient's current clinical condition, laboratory and radiographic findings as well as recommendations of physicians consulted on the case were discussed with the patient's family in detail in simple [place]. All questions and concerns of the family were addressed, and appropriate emotional support was provided. After understanding patient's current medical condition, family decided that they wanted to continue the ongoing treatment but in case patient's heart stops (cardiac arrest) or the patient stops breathing (respiratory arrest), they do not want any chest compressions, insertion of a breathing tube down patient's throat for respiratory support or other similar  resuscitative measures to be performed on the patient. They requested that if such a condition arises, the patient should be made comfortable and nature should be allowed to take its course. They asked that patient's code status should be changed to [facility] (no intubation), and signed the [facility] order form in my presence, which was witnessed by RN, Efren Pena. Will honor family's wishes, and will change patient's code status to [facility] (no intubation). Discussed with his son Shanel marlow and Alannahbrii marlow both are in agreement for [facility]. There is a third brother who lives in [place] who apparently has cut all contact with his siblings. Currently his brother and sister do not have any way to contact him and we do not have his phone number. Name is Davian Harkins. Will attempt to find contact info and infrom  him about his father. Alexey Garvey MD, M.D.   Pager: [phone]  Department of Internal Medicine,  [address] [place] [address])             12/26/2023, 1:46 PM

## 2023-12-26 NOTE — ACP (ADVANCE CARE PLANNING)
Advance Care Planning     Advance Care Planning (ACP) Physician/NP/PA (Provider) Conversation      Date of ACP Conversation: 12/26/23    Conversation Conducted with:   Family - 2 sons, 1 daughter    Health Care Decision Maker:    Current Designated Health Care Decision Maker:    Primary Decision Maker: Ishmael Romberg - Child - [phone]    Secondary Decision Maker: Bill Teran Child - [phone]    Care Preferences:    Hospitalization: \"If your health worsens and it becomes clear that your chance of recovery is unlikely, what would your preference be regarding hospitalization? \"  Currently hospitalized      Ventilation: \"If you were in your present state of health and suddenly became very ill and were unable to breathe on your own, what would your preference be about the use of a ventilator (breathing machine) if it was available to you? \"    Currently intubated    \"If your health worsens and it becomes clear that your chance of recovery is unlikely, what would your preference be about the use of a ventilator (breathing machine) if it was available to you? \"   Currently intubated    Resuscitation:  \"CPR works best to restart the heart when there is a sudden event, like a heart attack, in someone who is otherwise healthy. Unfortunately, CPR does not typically restart the heart for people who have serious health conditions or who are very sick. \"    \"In the event your heart stopped as a result of an underlying serious health condition, would you want attempts to be made to restart your heart (answer \"yes\" for attempt to resuscitate) or would you prefer a natural death (answer \"no\" for do not attempt to resuscitate)? \"   DNR-CCA       Conversation Outcomes / Follow-Up Plan:   Currently intubated  No CPR  3 Children are decision makers    Length of Voluntary ACP Conversation in minutes:  <16 minutes (Non-Billable)    Lanny Norton DO

## 2023-12-26 NOTE — PLAN OF CARE
Problem: Safety - Medical Restraint  Goal: Remains free of injury from restraints (Restraint for Interference with Medical Device)  Description: INTERVENTIONS:  1. Determine that other, less restrictive measures have been tried or would not be effective before applying the restraint  2. Evaluate the patient's condition at the time of restraint application  3. Inform patient/family regarding the reason for restraint  4. Q2H: Monitor safety, psychosocial status, comfort, nutrition and hydration  12/26/2023 0420 by Torres Coulter RN  Outcome: Progressing  12/25/2023 1611 by Bosler, Mary, RN  Outcome: Progressing  Flowsheets  Taken 12/25/2023 0600 by Capri Colvin RN  Remains free of injury from restraints (restraint for interference with medical device): Every 2 hours: Monitor safety, psychosocial status, comfort, nutrition and hydration  Taken 12/25/2023 0400 by Capri Colvin RN  Remains free of injury from restraints (restraint for interference with medical device): Every 2 hours: Monitor safety, psychosocial status, comfort, nutrition and hydration     Problem: Discharge Planning  Goal: Discharge to home or other facility with appropriate resources  12/26/2023 0420 by Torres Coulter RN  Outcome: Progressing  12/25/2023 1611 by Bosler, Mary, RN  Outcome: Progressing     Problem: Pain  Goal: Verbalizes/displays adequate comfort level or baseline comfort level  12/26/2023 0420 by Torres Coulter RN  Outcome: Progressing  12/25/2023 1611 by Bosler, Mary, RN  Outcome: Progressing     Problem: Respiratory - Adult  Goal: Achieves optimal ventilation and oxygenation  12/26/2023 0420 by Torres Coulter RN  Outcome: Progressing  12/25/2023 1611 by Bosler, Mary, RN  Outcome: Progressing     Problem: Skin/Tissue Integrity  Goal: Absence of new skin breakdown  Description: 1.  Monitor for areas of redness and/or skin breakdown  2.  Assess vascular access sites hourly  3.  Every 4-6 hours minimum:  Change oxygen  Psychosocial CNS and Spiritual Care consult, as indicated  12/26/2023 0420 by Asha Cisse RN  Outcome: Progressing  12/25/2023 1611 by Johsua Victor RN  Outcome: Progressing

## 2023-12-26 NOTE — PROGRESS NOTES
12/26/23 0452   Ventilator Settings   PEEP/CPAP (cmH2O) (S)  18     Pt sats 85%, paO2-50. Attempted too increase peep to 18. Pt did not tolerate. Sats decreased to 82%. Placed pt back on 100% peep 16.  Albuterol given

## 2023-12-27 NOTE — PROGRESS NOTES
ASPIRATE    Direct Exam < 10 EPITHELIAL CELLS/LPF     >25 NEUTROPHILS/LPF     MANY GRAM POSITIVE RODS    Culture NORMAL RESPIRATORY OVIDIO MODERATE GROWTH   Culture, Blood 1 [[id number]] Collected: 12/18/23 0513   Order Status: Completed Specimen: Blood Updated: 12/23/23 0535    Specimen Description .BLOOD    Special Requests RIGHT HAND 1ML    Culture NO GROWTH 5 DAYS   Culture, Blood 2 [[id number]] Collected: 12/18/23 0513   Order Status: Completed Specimen: Blood Updated: 12/23/23 0535    Specimen Description .BLOOD    Special Requests LEFT HAND 1ML    Culture NO GROWTH 5 DAYS   Culture, Blood 1 [[id number]] Collected: 12/18/23 0044   Order Status: Completed Specimen: Blood Updated: 12/23/23 0126    Specimen Description .BLOOD    Special Requests R AC 2ML    Culture NO GROWTH 5 DAYS   Culture, Respiratory [[id number]] Collected: 12/18/23 0440   Order Status: Completed Specimen: Sputum Updated: 12/20/23 0814    Specimen Description .SPUTUM    Direct Exam >25 NEUTROPHILS/LPF     < 10 EPITHELIAL CELLS/LPF     MIXED BACTERIAL MORPHOTYPES SEEN ON GRAM STAIN.    Culture NORMAL RESPIRATORY OVIDIO HEAVY GROWTH   Culture, Blood 1 [[id number]] (Abnormal) Collected: 12/18/23 0056   Order Status: Completed Specimen: Blood Updated: 12/20/23 0727    Specimen Description .BLOOD    Special Requests L UPPERARM 5ML    Culture POSITIVE Blood Culture Abnormal      DIRECT GRAM STAIN FROM BOTTLE: GRAM POSITIVE COCCI IN CLUSTERS     Detected: Coagulase negative Staphylococcus species (not  S.epidermidis, or S. lugdunensis) Culture Results to Follow Methodology- Polymerase Chain Reaction (PCR)     STAPHYLOCOCCUS SPECIES, COAGULASE NEGATIVE A single positive blood culture of coagulase negative Staphylocci, diphtheroids,micrococci, Cutibacterium, viridans Streptocci, Bacillus, or Lactobacillus species should be interpreted with caution and viewed as a likely skin contaminant.   Abnormal      (NOTE) Direct Gram Stain from bottle and  visit, the document can still have some errors including those of syntax and sound a like substitutions which may escape proof reading. In such instances, actual meaning can be extrapolated by contextual diversion.

## 2023-12-27 NOTE — CARE COORDINATION
Transitional planning. I/S FiO2 90%, PEEP of 16, OG for TF. Febrile, follows commands. Palliative following. [facility] is LTACH choice. Long Term Bed Hold at [facility] in [place].

## 2023-12-27 NOTE — PLAN OF CARE
Problem: Respiratory - Adult  Goal: Achieves optimal ventilation and oxygenation  12/27/2023 0906 by Josr Ventura RCP  Outcome: Progressing  12/26/2023 2030 by Jeovany Dunham RN  Outcome: Progressing

## 2023-12-27 NOTE — PLAN OF CARE
Problem: Safety - Medical Restraint  Goal: Remains free of injury from restraints (Restraint for Interference with Medical Device)  Description: INTERVENTIONS:  1. Determine that other, less restrictive measures have been tried or would not be effective before applying the restraint  2. Evaluate the patient's condition at the time of restraint application  3. Inform patient/family regarding the reason for restraint  4.  Q2H: Monitor safety, psychosocial status, comfort, nutrition and hydration  Outcome: Not Progressing  Flowsheets  Taken 12/27/2023 1244 by Giovanni Garcia RN  Remains free of injury from restraints (restraint for interference with medical device): Determine that other, less restrictive measures have been tried or would not be effective before applying the restraint  Taken 12/27/2023 1200 by Giovanni Garcia RN  Remains free of injury from restraints (restraint for interference with medical device): Determine that other, less restrictive measures have been tried or would not be effective before applying the restraint  Taken 12/27/2023 1000 by Giovanni Garcia RN  Remains free of injury from restraints (restraint for interference with medical device): Determine that other, less restrictive measures have been tried or would not be effective before applying the restraint  Taken 12/27/2023 0800 by Giovanni Garcia RN  Remains free of injury from restraints (restraint for interference with medical device): Determine that other, less restrictive measures have been tried or would not be effective before applying the restraint  Taken 12/27/2023 0000 by Kalyan Anderson RN  Remains free of injury from restraints (restraint for interference with medical device):   Determine that other, less restrictive measures have been tried or would not be effective before applying the restraint   Evaluate the patient's condition at the time of restraint application   Inform patient/family regarding the reason for restraint Every 2 hours: Monitor safety, psychosocial status, comfort, nutrition and hydration

## 2023-12-27 NOTE — PROGRESS NOTES
[x] None drawn       [] Negative             []  Positive (Details:  )   Endotracheal aspirate:     [x] None drawn       [] Negative             []  Positive (Details:  )     Chest Xray (12/27/2023):  XR CHEST PORTABLE   Final Result   No significant interval changes. XR CHEST PORTABLE   Final Result   Stable chest radiograph with cardiomegaly and pulmonary vascular congestion   and possible small bilateral pleural effusions with bibasilar   atelectasis/edema. XR CHEST PORTABLE   Final Result   Pulmonary edema with low lung volumes and bilateral pleural effusions with   bibasilar atelectasis are slightly worse. XR CHEST PORTABLE   Final Result   Bilateral airspace opacities which progressed at the right lung base in the   left perihilar region. The left costophrenic angle is now obscured, so a   small pleural effusion is possible. XR CHEST PORTABLE   Final Result   Cardiomegaly with pulmonary vascular congestion. Stable appearance of bibasilar atelectasis and/or consolidation. XR CHEST PORTABLE   Final Result   Mild cardiomegaly with scattered infiltrates. Support tubes as described above. CT HEAD WO CONTRAST   Final Result   CT scan of the head is significantly limited due to prominent streak   artifacts, due to patient's motions and also due to external metallic object. No obvious intracranial hemorrhage identified. The cerebral lateral   ventricles appears to be smaller in size as compared to previous CT scan of   the head in 2015. Therefore there is suspicion for diffuse cerebral edema. There is no midline shift. No diagnostic finding in calvarium. Evidence of diffuse sinusitis involving bilateral maxillary sinuses and   ethmoidal sinuses. Milder sinusitis in the bilateral frontal and sphenoidal   sinuses. On the CT of chest, there is no evidence of pulmonary embolism.       No definable thoracic aortic aneurysm or moderate pulmonary vascular congestion.         XR ABDOMEN FOR NG/OG/NE TUBE PLACEMENT   Final Result   Unremarkable limited KUB.      NG tube tip projects at the left upper quadrant, overlying the expected   location of the stomach.              ASSESSMENT:     Principal Problem:    Acute on chronic respiratory failure with hypoxia (HCC)  Active Problems:    COVID-19    Acute respiratory failure with hypoxia and hypercapnia (HCC)    Action induced myoclonus    Hypercarbic cerebral edema (HCC)    Observed seizure-like activity (HCC)    Toxic metabolic encephalopathy    Obesity hypoventilation syndrome (HCC)    Hypotension due to hypovolemia    Acute on chronic respiratory failure with hypoxia and hypercapnia (HCC)    Unvaccinated for covid-19    Elephantiasis    Morbid obesity (HCC)    Acute hypoxic respiratory failure (HCC)  Resolved Problems:    * No resolved hospital problems. *         PLAN:     PLAN/MEDICAL DECISION MAKING:  Neurologic:   Neuro checks per protocol  Propofol and fentanyl  LTME shows mild to moderate encephalopathy  Neurology  Cardiovascular:  Hemodynamically stable  MAP goal >65  ECHO shows EF 55-60%  Pulmonary:  Maintain oxygen sats >92%  Duoneb q4h    GI/Nutrition  GlycoLax as needed  Protonix BID  Diet:ADULT TUBE FEEDING; Orogastric; Peptide Based High Protein; Continuous; 10; Yes; 10; Q 4 hours; 60; 50; Q 6 hours  Renal/Fluid/Electrolyte  I/O: In: 2528.3 [I.V.:1008.1; NG/GT:1382]  Out: 1895 [Urine:1895]  Monitor electrolytes, replace PRN   Lasix 40 mg daily  ID  WBC:   Lab Results   Component Value Date    WBC 9.7 2023     Tmax: Temp (24hrs), Av.2 °F (38.4 °C), Min:100.8 °F (38.2 °C), Max:101.5 °F (38.6 °C)    Antimicrobials: None  COVID-positive.  On remdesivir finished  and Decadron also finished .  ID following.  Isolation till 2023  Hematology:  Recent Labs     23  1147 23  0150 23  0554   HGB 11.8* 11.3* 10.8*      stable  Endocrine:   glucose

## 2023-12-27 NOTE — PLAN OF CARE
Problem: Safety - Medical Restraint  Goal: Remains free of injury from restraints (Restraint for Interference with Medical Device)  Description: INTERVENTIONS:  1. Determine that other, less restrictive measures have been tried or would not be effective before applying the restraint  2. Evaluate the patient's condition at the time of restraint application  3. Inform patient/family regarding the reason for restraint  4.  Q2H: Monitor safety, psychosocial status, comfort, nutrition and hydration  12/26/2023 2030 by Juma Ochoa RN  Outcome: Progressing  Flowsheets (Taken 12/26/2023 2000)  Remains free of injury from restraints (restraint for interference with medical device):   Every 2 hours: Monitor safety, psychosocial status, comfort, nutrition and hydration   Determine that other, less restrictive measures have been tried or would not be effective before applying the restraint   Evaluate the patient's condition at the time of restraint application  43/20/0294 1227 by Gilberto Gutierrez RN  Outcome: Progressing  Flowsheets  Taken 12/26/2023 1200 by Gilberto Gutierrez RN  Remains free of injury from restraints (restraint for interference with medical device): Every 2 hours: Monitor safety, psychosocial status, comfort, nutrition and hydration  Taken 12/26/2023 0800 by Gilberto Gutierrez RN  Remains free of injury from restraints (restraint for interference with medical device): Every 2 hours: Monitor safety, psychosocial status, comfort, nutrition and hydration  Taken 12/26/2023 0600 by Juma Ochoa RN  Remains free of injury from restraints (restraint for interference with medical device): Every 2 hours: Monitor safety, psychosocial status, comfort, nutrition and hydration     Problem: Respiratory - Adult  Goal: Achieves optimal ventilation and oxygenation  12/26/2023 2030 by Juma Ochoa RN  Outcome: Progressing  12/26/2023 1227 by Gilberto Gutierrez RN  Outcome: Progressing  12/26/2023 0808 by Pradeep Rea RCP  Outcome: Progressing

## 2023-12-28 NOTE — PROGRESS NOTES
12/28/23 0834   Care Plan - Respiratory Goals   Achieves optimal ventilation and oxygenation Assess for changes in respiratory status; Assess for changes in mentation and behavior;Position to facilitate oxygenation and minimize respiratory effort;Oxygen supplementation based on oxygen saturation or arterial blood gases; Initiate smoking cessation protocol as indicated;Encourage broncho-pulmonary hygiene including cough, deep breathe, incentive spirometry; Assess the need for suctioning and aspirate as needed;Assess and instruct to report shortness of breath or any respiratory difficulty; Respiratory therapy support as indicated

## 2023-12-28 NOTE — PROGRESS NOTES
12/27/23 2113   Ventilator Settings   PEEP/CPAP (cmH2O) 20     Increase of peep attempted due to poor oxygenation on blood gas.  SpO2 did not change, peaks went to 37, pt flipped back to peep of 18

## 2023-12-28 NOTE — PROGRESS NOTES
PALLIATIVE CARE PROGRESS NOTE     NAME:  Chance Ewing  MEDICAL RECORD NUMBER:  [id number]  AGE: 63 y.o. GENDER: male  : 1965  TODAY'S DATE:  2023  Room: 30093009-01    Reason For Consult:  Goals of care evaluation  Distress management  Symptom Management  Guidance and support  Facilitate communications  Assistance in coordinating care  Recommendations for the above    Plan      Palliative Interaction:  The palliative care team was able to meet with the patient this morning. On 100% FiO2. 18 of PEEP. SpO2 dipping under 90 at times. Called the patient's son Oscar Blake today to discuss. As you may recall, family wanted to wait [facility] couple of days\" to see if the patient made any turnaround. Unfortunately, he remains the same at this time. Discussed options moving forward including a likely prolonged hospitalization with likely trach and PEG versus withdrawing care. Oscar Blake says that he and his brother have spoken and believe that they will be withdrawing care tomorrow. They will be here at the hospital to discuss with the team and make a decision at that time. He does state that his sister is not handling the situation very well. She is quite emotional, which is understandable. Support was given to family. ICU team was updated. Anticipate withdrawal of care tomorrow. Please reach out to palliative care if we can assist with medications or conversations. Our NP Tip Aguirre will be available tomorrow morning. IMPRESSION/ PLAN  Symptom management/pain control    We feel the patient's symptoms are being controlled. Their current regimen has been reviewed by myself and discussed with the staff. We recommend adjusting their medications as follows:    Goals of care evaluation  The patient goals of care are support to family  Long discussion to ensure the patient's and family's understanding of goals of care, and theconcept of palliative care.     Code Status:  DNR-CCA    Other Recommendations - none      History of Present Illness     HISTORY OF PRESENT ILLNESS:   The patient is a 58 y.o. male who initially presented to [facility] ED for evaluation after being found unresponsive by nursing staff at the [facility] at which he resides. The patient has a history of opioid use disorder and had reportedly left his facility for some time on the evening that he was found unresponsive. He was intubated at [place]'s ED and was transferred to [facility] ED for further eval/treatment. Lab workup revealed that he was positive for COVID-19 infection. He also tested positive for opioids. He appears to have been on suboxone in the past.      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     The patient has decompensated from a respiratory standpoint and is now requiring 100% FiO2 with a PEEP of 16. Cardiothoracic surgery has reportedly deemed the patient a poor ECMO candidate     Palliative care has been consulted to assist with goals of care for the patient.    OVERNIGHT EVENTS:  Patient seen this morning.  Clinically unchanged, maybe even slightly worse.  On 100% FiO2 with 18 PEEP.      Vital Signs:  BP (!) 182/86   Pulse (!) 112   Temp 100 °F (37.8 °C)   Resp 29   Ht 1.905 m (6' 3\")   Wt (!) 199 kg (438 lb 12.8 oz)   SpO2 92%   BMI 54.85 kg/m²     Pertinent Laboratory StudiesReviewed by Me Personally Today:  Lab Results   Component Value Date    WBC 10.2 12/28/2023    HGB 10.9 (L) 12/28/2023    HCT 37.5 (L) 12/28/2023    MCV 87.2 12/28/2023    PLT See Reflexed IPF Result 12/28/2023     Lab Results   Component Value Date/Time     12/28/2023 03:17 AM    K 4.6 12/28/2023 03:17 AM     12/28/2023 03:17 AM    CO2 40 12/28/2023 03:17 AM    BUN 59 12/28/2023 03:17 AM    CREATININE 1.1 12/28/2023 03:17 AM

## 2023-12-28 NOTE — PLAN OF CARE
Problem: Safety - Medical Restraint  Goal: Remains free of injury from restraints (Restraint for Interference with Medical Device)  Description: INTERVENTIONS:  1. Determine that other, less restrictive measures have been tried or would not be effective before applying the restraint  2. Evaluate the patient's condition at the time of restraint application  3. Inform patient/family regarding the reason for restraint  4.  Q2H: Monitor safety, psychosocial status, comfort, nutrition and hydration  12/28/2023 1626 by Malgorzata Fox RN  Outcome: Not Progressing  Flowsheets  Taken 12/28/2023 1600  Remains free of injury from restraints (restraint for interference with medical device): Every 2 hours: Monitor safety, psychosocial status, comfort, nutrition and hydration  Taken 12/28/2023 1400  Remains free of injury from restraints (restraint for interference with medical device): Every 2 hours: Monitor safety, psychosocial status, comfort, nutrition and hydration  Taken 12/28/2023 1200  Remains free of injury from restraints (restraint for interference with medical device): Every 2 hours: Monitor safety, psychosocial status, comfort, nutrition and hydration  Taken 12/28/2023 1157  Remains free of injury from restraints (restraint for interference with medical device): Every 2 hours: Monitor safety, psychosocial status, comfort, nutrition and hydration  Taken 12/28/2023 1000  Remains free of injury from restraints (restraint for interference with medical device): Every 2 hours: Monitor safety, psychosocial status, comfort, nutrition and hydration  Taken 12/28/2023 0800  Remains free of injury from restraints (restraint for interference with medical device): Every 2 hours: Monitor safety, psychosocial status, comfort, nutrition and hydration  12/28/2023 0510 by Maday Hodge RN  Outcome: Progressing  Flowsheets  Taken 12/28/2023 0000 by Maday Hodge RN  Remains free of injury from restraints (restraint for interference with

## 2023-12-28 NOTE — PLAN OF CARE
Problem: Safety - Medical Restraint  Goal: Remains free of injury from restraints (Restraint for Interference with Medical Device)  Description: INTERVENTIONS:  1. Determine that other, less restrictive measures have been tried or would not be effective before applying the restraint  2. Evaluate the patient's condition at the time of restraint application  3. Inform patient/family regarding the reason for restraint  4. Q2H: Monitor safety, psychosocial status, comfort, nutrition and hydration  Outcome: Progressing  Flowsheets  Taken 12/28/2023 0000 by Holly Shirley RN  Remains free of injury from restraints (restraint for interference with medical device): Every 2 hours: Monitor safety, psychosocial status, comfort, nutrition and hydration     Problem: Discharge Planning  Goal: Discharge to home or other facility with appropriate resources  Outcome: Progressing     Problem: Pain  Goal: Verbalizes/displays adequate comfort level or baseline comfort level  Outcome: Progressing     Problem: Respiratory - Adult  Goal: Achieves optimal ventilation and oxygenation  12/28/2023 0510 by Holly Shirley RN  Outcome: Progressing    Problem: Skin/Tissue Integrity  Goal: Absence of new skin breakdown  Description: 1. Monitor for areas of redness and/or skin breakdown  2. Assess vascular access sites hourly  3. Every 4-6 hours minimum:  Change oxygen saturation probe site  4. Every 4-6 hours:  If on nasal continuous positive airway pressure, respiratory therapy assess nares and determine need for appliance change or resting period.   Outcome: Progressing     Problem: Safety - Adult  Goal: Free from fall injury  Outcome: Progressing     Problem: ABCDS Injury Assessment  Goal: Absence of physical injury  Outcome: Progressing     Problem: Chronic Conditions and Co-morbidities  Goal: Patient's chronic conditions and co-morbidity symptoms are monitored and maintained or improved  Outcome: Progressing     Problem: Nutrition

## 2023-12-29 NOTE — PROGRESS NOTES
Critical Care Team - Daily Progress Note      Date and time: 12/29/2023 7:29 AM  Patient's name:  Johnny Dozier  Medical Record Number: [id number]  Patient's account/billing number: [id number]  Patient's YOB: 1965  Age: 58 y.o.  Date of Admission: 12/17/2023 11:55 PM  Length of stay during current admission: 11      Primary Care Physician: Tabatha Guajardo MD  ICU Attending Physician:      Code Status: DNR-CCA    Reason for ICU admission:   AMS, COVID, pna, respiratory failure      SUBJECTIVE:     OVERNIGHT EVENTS: Was not responding to pain stimulus overnight after sedation was off.  Neuro consulted.  Sedation resumed considering patient was tachypneic and tachycardic.    Subjective:  -Tmax 101.6  -Hemodynamically stable with blood pressure 160/66 mmHg with MAP of 93.  -Currently sedated on 50 mcg/h fentanyl and 50 mcg/kg/min propofol  -Currently on ventilator rate set 16, tidal volume 500, PEEP of 18, and FiO2 100%.  Patient on high PEEP and high FiO2 and not saturating well.  Not candidate for mass per CT surgery.  -Last ABG shows pH 7.405, pCO2 74.5, pO2 66.6, and HCO3 46.7.  -BMP pending.  Glucose level 196.  WBC pending.  -Currently on Zosyn.  End date 12/28/2023 for concern of pneumonia.  Completed remdesivir on 12/22/2023.  ID recommendation isolation of total 21 days.  -Urine output 3.9 L/ 24 hours.  I/os + 267 mL mL.  -7 L since admit. Free water flushes increased to 500ml q 4 hrs.          F.A.S.T. M. H.U.G.S. B.I.D.    Feeding Diet: ADULT TUBE FEEDING; Orogastric; Peptide Based High Protein; Continuous; 10; Yes; 10; Q 4 hours; 60; 500; Q 4 hours  Fluids: none  Family: yes   Analgesic: fentanyl, oxycodone 10 mg every 6 hours.  Sedation: propofol and fentanyl.  Roxicodone 10 mg every 6 hours.  Thrombo-prophylaxis: [x] Enoxaparin, [] Unfract. Heparin Subcutaneously, [x] EPC Cuffs  Mobility: no  Heads up: yes  Ulcer prophylaxis: [x] PPI Agent, [] P8Lucuv, [] Sucralfate, [] Other:  Glycemic  bilateral maxillary sinuses and   ethmoidal sinuses. Milder sinusitis in the bilateral frontal and sphenoidal   sinuses. On the CT of chest, there is no evidence of pulmonary embolism. No definable thoracic aortic aneurysm or dissection. Evidence of previous   CABG. No acute process in the mediastinum. Mild-to-moderate atelectatic changes, and/or infiltrate in the right   pulmonary lower lobe. Mild atelectatic changes, and/or infiltrates in the   left pulmonary lower lobe, posterior portion of base of right pulmonary upper   lobe and less obvious in the lingula of left lung. Mild-to-moderate pulmonary vascular congestion. Suspected interstitial   pulmonary edema in lungs. No pneumothorax or pneumomediastinum. CT CHEST PULMONARY EMBOLISM W CONTRAST   Final Result   CT scan of the head is significantly limited due to prominent streak   artifacts, due to patient's motions and also due to external metallic object. No obvious intracranial hemorrhage identified. The cerebral lateral   ventricles appears to be smaller in size as compared to previous CT scan of   the head in 2015. Therefore there is suspicion for diffuse cerebral edema. There is no midline shift. No diagnostic finding in calvarium. Evidence of diffuse sinusitis involving bilateral maxillary sinuses and   ethmoidal sinuses. Milder sinusitis in the bilateral frontal and sphenoidal   sinuses. On the CT of chest, there is no evidence of pulmonary embolism. No definable thoracic aortic aneurysm or dissection. Evidence of previous   CABG. No acute process in the mediastinum. Mild-to-moderate atelectatic changes, and/or infiltrate in the right   pulmonary lower lobe. Mild atelectatic changes, and/or infiltrates in the   left pulmonary lower lobe, posterior portion of base of right pulmonary upper   lobe and less obvious in the lingula of left lung.       Mild-to-moderate pulmonary vascular

## 2023-12-29 NOTE — PROGRESS NOTES
12/29/23 0201   ETT    Placement Date/Time: 12/17/23 1956   Present on Admission/Arrival: No  Placed By: In ED  Placement Verified By: Auscultation;Colorimetric ETCO2 device  Preoxygenation: Yes  Technique: Stylet  Airway Tube Size: 8 mm  Laryngoscope: GlideScope  Location:. ..    Secured At (S)  26 cm   Measured From (S)  Lips     Tube advanced per cxr and , breath sounds bilaterally

## 2023-12-29 NOTE — PLAN OF CARE
Problem: Respiratory - Adult  Goal: Achieves optimal ventilation and oxygenation  12/29/2023 0905 by To Sr RCP  Outcome: Progressing

## 2023-12-29 NOTE — PLAN OF CARE
Problem: Safety - Medical Restraint  Goal: Remains free of injury from restraints (Restraint for Interference with Medical Device)  Description: INTERVENTIONS:  1. Determine that other, less restrictive measures have been tried or would not be effective before applying the restraint  2. Evaluate the patient's condition at the time of restraint application  3. Inform patient/family regarding the reason for restraint  4.  Q2H: Monitor safety, psychosocial status, comfort, nutrition and hydration  Outcome: Progressing  Flowsheets  Taken 12/29/2023 0600 by Michael Huff RN  Remains free of injury from restraints (restraint for interference with medical device): Every 2 hours: Monitor safety, psychosocial status, comfort, nutrition and hydration  Taken 12/29/2023 0400 by Michael Huff RN  Remains free of injury from restraints (restraint for interference with medical device): Every 2 hours: Monitor safety, psychosocial status, comfort, nutrition and hydration  Taken 12/29/2023 0200 by Michael Huff RN  Remains free of injury from restraints (restraint for interference with medical device): Every 2 hours: Monitor safety, psychosocial status, comfort, nutrition and hydration  Taken 12/29/2023 0000 by Michael Huff RN  Remains free of injury from restraints (restraint for interference with medical device): Every 2 hours: Monitor safety, psychosocial status, comfort, nutrition and hydration  Taken 12/28/2023 2200 by Michael Huff RN  Remains free of injury from restraints (restraint for interference with medical device): Every 2 hours: Monitor safety, psychosocial status, comfort, nutrition and hydration  Taken 12/28/2023 2000 by Michael Huff RN  Remains free of injury from restraints (restraint for interference with medical device): Every 2 hours: Monitor safety, psychosocial status, comfort, nutrition and hydration  Taken 12/28/2023 1800 by Wilma Mae RN  Remains free of injury from restraints (restraint for interference with medical device): Every 2 hours: Monitor safety, psychosocial status, comfort, nutrition and hydration     Problem: Discharge Planning  Goal: Discharge to home or other facility with appropriate resources  Outcome: Progressing     Problem: Pain  Goal: Verbalizes/displays adequate comfort level or baseline comfort level  Outcome: Progressing     Problem: Respiratory - Adult  Goal: Achieves optimal ventilation and oxygenation  12/29/2023 0630 by Sean Birmingham RN  Outcome: Progressing     Problem: Skin/Tissue Integrity  Goal: Absence of new skin breakdown  Description: 1.  Monitor for areas of redness and/or skin breakdown  2.  Assess vascular access sites hourly  3.  Every 4-6 hours minimum:  Change oxygen saturation probe site  4.  Every 4-6 hours:  If on nasal continuous positive airway pressure, respiratory therapy assess nares and determine need for appliance change or resting period.  Outcome: Progressing

## 2023-12-29 NOTE — PROGRESS NOTES
[facility]  Progress Note    Daryle Hai  MRN: [id number]  Date: 12/29/2023  LOS: 6     Reason for F/U :   COVID-19 virus infection, respiratory failure    Impression :   COVID-19 virus infection tested + 12/17/2023  Acute on chronic respiratory failure requiring intubation  Suspected drug overdose  Right lower extremity erythroderma felt secondary to vascular skin disease rather than cellulitis  COPD  Morbid obesity  CAD status post CABG  History of left AKA after MRSA infection of total knee replacement  History of tricuspid valve endocarditis requiring replacement 2009  Coagulase-negative staph in 1 out of 2 blood cultures likely a contaminant    Recommendations: The patient received remdesivir 12/18 through 12/22/2023  Patient completed a 6-day course of Decadron initiated 12/18/2023  Actemra not indicated    Patient is on Zosyn for pneumonia coverage started 12/23 through 12/29/2023 to complete a 7-day course of therapy  The patient continues to require high FiO2 and PEEP and continues to have fevers with thick yellow secretions. The repeat blood culture has been negative thus far and the sputum culture is not yielding any organism  At this point in time it would seem that these are not congestive changes and that this is secondary bacterial infection or COVID-related changes  I will repeat CRP testing today  I would therefore continue isolation for a total of 21 days    Infection Control Recommendations:   Droplet plus precautions    Discharge Planning:     Patient will need Midline Catheter Insertion/ PICC line Insertion: No  Patient will need: Home IV , [address] [facility],  [facility],  LTAC: Undetermined  Patient willneed outpatient wound care: No    Medical Decision making / Summary of Stay:   Daryle Hai is a 63y.o.-year-old  male who was initially admitted on 12/17/2023.  Patient seen at the request of Dr. Joselito Khan:     Patient initially presented   Order Status: Completed Specimen: Endotracheal Tube Updated: 12/27/23 1011    Specimen Description .ENDOTRACHEAL TUBE    Direct Exam < 10 EPITHELIAL CELLS/LPF     <10 NEUTROPHILS/LPF     NO SIGNIFICANT PATHOGENS SEEN    Culture NORMAL RESPIRATORY OVIDIO MODERATE GROWTH     Culture, Respiratory [[id number]] Collected: 12/22/23 1339   Order Status: Completed Specimen: Tracheal Aspirate Updated: 12/24/23 0808    Specimen Description .TRACHEAL ASPIRATE    Direct Exam < 10 EPITHELIAL CELLS/LPF     >25 NEUTROPHILS/LPF     MANY GRAM POSITIVE RODS    Culture NORMAL RESPIRATORY OVIDIO MODERATE GROWTH   Culture, Blood 1 [[id number]] Collected: 12/18/23 0513   Order Status: Completed Specimen: Blood Updated: 12/23/23 0535    Specimen Description .BLOOD    Special Requests RIGHT HAND 1ML    Culture NO GROWTH 5 DAYS   Culture, Blood 2 [[id number]] Collected: 12/18/23 0513   Order Status: Completed Specimen: Blood Updated: 12/23/23 0535    Specimen Description .BLOOD    Special Requests LEFT HAND 1ML    Culture NO GROWTH 5 DAYS   Culture, Blood 1 [[id number]] Collected: 12/18/23 0044   Order Status: Completed Specimen: Blood Updated: 12/23/23 0126    Specimen Description .BLOOD    Special Requests R AC 2ML    Culture NO GROWTH 5 DAYS   Culture, Respiratory [[id number]] Collected: 12/18/23 0440   Order Status: Completed Specimen: Sputum Updated: 12/20/23 0814    Specimen Description .SPUTUM    Direct Exam >25 NEUTROPHILS/LPF     < 10 EPITHELIAL CELLS/LPF     MIXED BACTERIAL MORPHOTYPES SEEN ON GRAM STAIN.    Culture NORMAL RESPIRATORY OVIDIO HEAVY GROWTH   Culture, Blood 1 [[id number]] (Abnormal) Collected: 12/18/23 0056   Order Status: Completed Specimen: Blood Updated: 12/20/23 0727    Specimen Description .BLOOD    Special Requests L UPPERARM 5ML    Culture POSITIVE Blood Culture Abnormal      DIRECT GRAM STAIN FROM BOTTLE: GRAM POSITIVE COCCI IN CLUSTERS     Detected: Coagulase negative Staphylococcus species (not   S.epidermidis, or S. lugdunensis) Culture Results to Follow Methodology- Polymerase Chain Reaction (PCR)     STAPHYLOCOCCUS SPECIES, COAGULASE NEGATIVE A single positive blood culture of coagulase negative Staphylocci, diphtheroids,micrococci, Cutibacterium, viridans Streptocci, Bacillus, or Lactobacillus species should be interpreted with caution and viewed as a likely skin contaminant.   Abnormal      (NOTE) Direct Gram Stain from bottle and Polymerase Chain Reaction (PCR) results called to and read back by: CEZAR WOMACK AT 1711 ON 12.18.2023   Culture, Urine [[id number]] Collected: 12/18/23 0038   Order Status: Completed Specimen: Urine, clean catch Updated: 12/19/23 0647    Specimen Description .CLEAN CATCH URINE    Culture NO SIGNIFICANT GROWTH   MRSA DNA Probe, Nasal [[id number]] Collected: 12/18/23 0432   Order Status: Completed Specimen: Nasal Updated: 12/18/23 0935    Specimen Description .NASAL SWAB    MRSA, DNA, Nasal NEGATIVE    Comment: NEGATIVE:  MRSA DNA not detected by nucleic acid amplification.                                                   Results should be used as an adjunct to nosocomial control efforts to identify patients  needing enhanced precautions.    The test is not intended to identify patients with staphylococcal infections.  Results  should not be used to guide or monitor treatment for MRSA infections.          Medications:      insulin glargine  35 Units SubCUTAneous BID    senna-docusate  1 tablet Per NG tube BID    oxyCODONE  10 mg Oral Q6H    insulin lispro  0-16 Units SubCUTAneous Q4H    piperacillin-tazobactam  3,375 mg IntraVENous Q8H    aspirin  81 mg Oral Daily    sodium chloride flush  5-40 mL IntraVENous 2 times per day    enoxaparin  60 mg SubCUTAneous BID    ipratropium 0.5 mg-albuterol 2.5 mg  1 Dose Inhalation Q4H WA RT    pantoprazole (PROTONIX) 40 mg in sodium chloride (PF) 0.9 % 10 mL injection  40 mg IntraVENous Q12H       Electronically signed by Traci ROSAS

## 2023-12-29 NOTE — PLAN OF CARE
Problem: Safety - Medical Restraint  Goal: Remains free of injury from restraints (Restraint for Interference with Medical Device)  Description: INTERVENTIONS:  1. Determine that other, less restrictive measures have been tried or would not be effective before applying the restraint  2. Evaluate the patient's condition at the time of restraint application  3. Inform patient/family regarding the reason for restraint  4.  Q2H: Monitor safety, psychosocial status, comfort, nutrition and hydration  2023 1024 by Henriette Apgar, RN  Outcome: Progressing  Flowsheets  Taken 2023 1000  Remains free of injury from restraints (restraint for interference with medical device): Every 2 hours: Monitor safety, psychosocial status, comfort, nutrition and hydration  Taken 2023 0800  Remains free of injury from restraints (restraint for interference with medical device): Every 2 hours: Monitor safety, psychosocial status, comfort, nutrition and hydration     Problem: Discharge Planning  Goal: Discharge to home or other facility with appropriate resources  2023 1024 by Henriette Apgar, RN  Outcome: Progressing     Problem: Pain  Goal: Verbalizes/displays adequate comfort level or baseline comfort level  2023 1024 by Henriette Apgar, RN  Outcome: Progressing

## 2023-12-29 NOTE — PROGRESS NOTES
Comprehensive Nutrition Assessment    Type and Reason for Visit:  Reassess    Nutrition Recommendations/Plan:   Continue current TF of Peptide Based High Protein formula at goal 60 mL/hr. Will monitor TF tolerance/adequacy of intakes and rate of propofol. If propofol discontinued, suggest TF of Diabetic formula goal 60 mL/hr. Free water flushes per MD.  Monitor for BM, labs, weights, and plan of care. Malnutrition Assessment:  Malnutrition Status:  Insufficient data (12/29/23 1334)    Context:  Acute Illness       Nutrition Assessment:    Pt remains intubated and sedated. Propofol running at 13.4 mL/hr at visit. RN reports pt continues to tolerate TF of Peptide Based High Protein formula at goal 60 mL/hr. Pt also receiving free water flushes of 500 mL q 4 hrs. No recorded BM since admission. Weight fluctuations noted - ? losses due to fluids. Labs reviewed: Na 150 mmol/L, K 3.5 mmol/L, Mg 3.2 mg/dL, BUN 53 mg/dL, Glucose 183-199 mg/dL. Meds reviewed: Lantus, Humalog SS, Glycolax PRN; KCl replacement. Nutrition Related Findings:    Labs/Meds reviewed. No BM since admission. Wound Type: None       Current Nutrition Intake & Therapies:    Average Meal Intake: NPO  Average Supplements Intake: NPO  ADULT TUBE FEEDING; Orogastric; Peptide Based High Protein; Continuous; 10; Yes; 10; Q 4 hours; 60; 500; Q 4 hours  Current Tube Feeding (TF) Orders:  Feeding Route: Orogastric  Formula: Peptide Based High Protein  Schedule: Continuous  Feeding Regimen: 60 mL/hr  Additives/Modulars: None  Water Flushes: per MD: 500 mL q 4 hrs  Current TF & Flush Orders Provides:  At 60 mL/hr = 1440 kcals, 126 gm protein  Goal TF & Flush Orders Provides: Peptide Based High Protein at 60 mL/hr = 1440 kcals (+propofol kcals), 126 gm protein, 1204 mL free water (total 4204 mL free water TF + flushes)    Additional Calorie Sources:  Propofol at 13.4-20.2 mL/hr = 354-533 kcals    Anthropometric Measures:  Height: 190.5 cm (6'

## 2023-12-29 NOTE — DISCHARGE SUMMARY
[address] [facility]     Department of Internal Medicine - Critical Care Service    INPATIENT DISCHARGE SUMMARY      PATIENT IDENTIFICATION:  NAME:  Gege Cazares   :   1965  MRN:    [id number]     Acct:    [de-identified]   Admit Date:  2023  Discharge date:   on 23   Attending Provider: Azra Morrison MD                                     Principal Problem:    Acute on chronic respiratory failure with hypoxia [facility])  Active Problems:    COVID-19    Acute respiratory failure with hypoxia and hypercapnia (HCC)    Action induced myoclonus    Hypercarbic cerebral edema (HCC)    Observed seizure-like activity (HCC)    Toxic metabolic encephalopathy    Obesity hypoventilation syndrome (HCC)    Hypotension due to hypovolemia    Acute on chronic respiratory failure with hypoxia and hypercapnia ([address])    Unvaccinated for covid-19    Elephantiasis    Morbid obesity ([address])    Acute hypoxic respiratory failure ([address])  Resolved Problems:    * No resolved hospital problems. *       REASON FOR HOSPITALIZATION:   Chief Complaint   Patient presents with    Altered Mental Status          Hospital Course    Gege Cazares is a 63 y.o. male who presents to the ED as a transfer from [place] after he was found to be unresponsive at the [facility], he does have a history of drug use and was given Narcan with no response, and had to be intubated for airway protection. EMS also reports that he had upper extremity tremors concerning for seizure activity. urine drug screen was positive for opiates, he also tested positive for COVID. Chest x-ray was concerning for moderate diffuse pulmonary edema. CT of the chest showed no evidence of pulmonary embolism, showed evidence of previous CABG, mild to moderate pulmonary vascular congestion. CT of the head showed showed no obvious intracranial hemorrhage. There is suspicion for diffuse cerebral edema.      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     12/22: Spiking fevers. Resp culture obtained. Started on zosyn and normal saline nasal sprays. Started on lantus.  12/23: Lantus increased to BID  12/28: Lantus adjusted to 35 BID. Change in mental status overnight, no longer responding to painful stimuli, neurology made aware.    Patient continued to be on ventilator at 100% FiO2 throughout his stay in ICU.    12/29: Family changed CODE STATUS to DNR-CC. Terminally extubated.      Consults:   ID and neurology    Procedures:  None    Any Hospital Acquired Infections: None    PATIENT'S DISCHARGE CONDITION:      PATIENT/FAMILY INSTRUCTIONS:   Current Discharge Medication List        CONTINUE these medications which have NOT CHANGED    Details   meloxicam (MOBIC) 15 MG tablet       potassium chloride (KLOR-CON M) 20 MEQ TBCR extended release tablet       triamcinolone (KENALOG) 0.1 % ointment       ipratropium-albuterol (DUONEB) 0.5-2.5 (3) MG/3ML SOLN nebulizer solution       !! metoprolol tartrate (LOPRESSOR) 25 MG tablet       !! furosemide (LASIX) 40 MG tablet       !! triamterene-hydroCHLOROthiazide (MAXZIDE-25) 37.5-25 MG per tablet Take 1 tablet by mouth daily      white petrolatum-corn starch-lanolin (TRIPLE PASTE) 12.8 % ointment Apply topically 2 times daily      naloxone 4 MG/0.1ML LIQD nasal spray 4 mg as needed      doxycycline hyclate (VIBRAMYCIN) 100 MG capsule       atenolol (TENORMIN) 100 MG tablet Take 100 mg by mouth daily      !! furosemide (LASIX) 80 MG tablet Take 80 mg by mouth daily      cephALEXin (KEFLEX) 500 MG capsule Take 1 capsule by mouth 4 times daily  Qty: 28 capsule, Refills: 0      clotrimazole (LOTRIMIN) 1 % cream APPLY TOPICALLY TWICE DAILY  Qty: 60 g, Refills: 1    Comments: We are requesting this refill to have on file for

## 2023-12-29 NOTE — PROGRESS NOTES
[place] [id number] [place] [address]  PROGRESS NOTE    Shift date: 12/29/23  Shift day: Friday   Shift # 2    Room # 7186/0787-21   Name: Prerna Stoner                Tenriism:    Place of Taoist:     Referral: Routine Visit    Admit Date & Time: 12/17/2023 11:55 PM    Assessment:  Prerna Stoner is a 63 y.o. male in the hospital. Mary Jo Overton was contacted for family comfort during extubation. Intervention:  Writer introduced self and title as . Family appeared receptive to  visit and engaged in conversation.  provided a ministry of presence allowing space for feelings and emotions. Family requested bedside blessing and prayer which  facilitated. Outcome:  Family continues coping with anticipatory grief. Plan:  Chaplains will remain available to offer spiritual and emotional support as needed.       Electronically signed by Lin Oneal on 12/29/2023 at [address] [place] [address]  [phone]

## 2023-12-29 NOTE — DEATH NOTES
Death Pronouncement Note  Patient's Name: Taina Jackson   Patient's YOB: 1965  MRN Number: [id number]    Admitting Provider: Dmitriy Prater MD  Attending Provider: Dmitriy Prater MD    Patient was examined and the following were absent: Pulses, Blood Pressure, and Respiratory effort    I declared the patient dead on 12/29/2023 at 6:07 PM    Preliminary Cause of Death: Cardiopulmonary arrest [facility])     Electronically signed by Alvin Rosas MD on 12/29/23 at 6:12 PM EST

## 2023-12-29 NOTE — PROGRESS NOTES
Patient extubated per physician order. Patient extubated in usual fashion. Patient placed on  room air.      Radha Machado RCP   6:04 PM

## 2023-12-29 NOTE — PLAN OF CARE
Son Antonino Dickey at bedside. Son Mack Led on [place]. Daughter Erika Al did not want to be in attendance. Both Mack Led and Antonino Dickey would like to change code status to [facility]. Code Status changed to [facility] per family's wishes. Will proceed with terminal extubation at this time.     Joan Thompson MD

## 2023-12-30 LAB
MICROORGANISM SPEC CULT: NORMAL
MICROORGANISM SPEC CULT: NORMAL
SERVICE CMNT-IMP: NORMAL
SERVICE CMNT-IMP: NORMAL
SPECIMEN DESCRIPTION: NORMAL
SPECIMEN DESCRIPTION: NORMAL

## 2023-12-30 NOTE — PROGRESS NOTES
[address] [address]   Patient Death Note  DEATH   Shift date: 23    Shift day: Friday  Shift #: 2                 Room # 4821/3550-37   Name: Taina Jackson            Age: 63 y.o. Gender: male          Anglican: Other      Place of [facility]:   Admit Date & Time: 2023 11:55 PM     Referral: Nurse   Actual date of death: 23   TOD: 18:07       SITUATION AT DEATH:  Family at bedside, sad, tearful.  was present in room along with medical staff. IS THIS A 'S CASE? Yes    SPIRITUAL/EMOTIONAL INTERVENTION:   provided a ministry of presence through active listening while allowing space for feelings and emotions.  offered bedside prayer/blessing which was accepted by family.  spoke with son Eduar Steen about next steps and what to expect.  discussed FH which one has not been chosen, along with the possibility of 's case and possibly receiving a call from [facility] for organ donation. Eduar Steen expressed gratitude for  presence and information. Eduar Steen received grief packet along with Spiritual Care contact number.  was informed patient is a 's case and left message for Eduar Steen to call back (23 @ 8:15pm)       Family Received Grief Packet? Yes       Patient pronounced by Dr. Alvin Rosas. Copy of COMPLETED Release of Body Form Received? Yes    Patient's belongings: Unknown at time of writing     HOME:  Name: [facility]  City: [facility]  Phone Number: [facility]    NEXT OF KIN:  Name: Rodrigo Needle  Relationship: Son  Street Address: [address]: [place]  Zip code: [id number]   Phone Number: [phone]    [address] [place] [address]? Yes    IF SO, WHAT? Contact Eduar Steen (son) to let him know update next steps of Ruddy Cordova going from our care to 's care.      Electronically signed by Savannah Councilman, on 2023 at [facility]  [phone]

## 2024-01-07 NOTE — PROGRESS NOTES
Physician Progress Note      PATIENT:               LEIGHTON DOWNS  CSN #:                  [id number]  :                       1965  ADMIT DATE:       2023 11:55 PM  DISCH DATE:        2023 10:30 PM  RESPONDING  PROVIDER #:        ANASTACIO FINK          QUERY TEXT:    Patient admitted with Acute Respiratory Failure/Sepsis. Documentation reflects   possible Opiate OD throughout chart..  If possible, please document in the   progress notes and discharge summary if Opiate Overdose was:    The medical record reflects the following:  Risk Factors: Hx Opiate abuse, COPD,Morbid Obesity  Clinical Indicators: Transfer from [facility] after found unresponsive at [facility] for   unknown amount of time. Known history of drug use and was given Narcan x2 with   no response. Intubated for airway protection. UDS was positive for Opiates.   He also tested positive for Covid with confirmed Pneumonia/Sepsis.  Developed   Fevers and change in mental status during admit-no longer responding to   painful stimuli. Code status then changed and terminal extubation on .  Treatment: ICU care on mechanical vent. Narcan. Covid sepsis tx with   palliative care consulted for terminal extubation    Thank you,  [email or web address]  office hours  m-[id number]  Options provided:  -- Accidental Opiate OD under recreational use suspected after study  -- Accidental Opiate OD under recreational use ruled out after study  -- Other - I will add my own diagnosis  -- Disagree - Not applicable / Not valid  -- Disagree - Clinically unable to determine / Unknown  -- Refer to Clinical Documentation Reviewer    PROVIDER RESPONSE TEXT:    Provider is clinically unable to determine a response to this query.    Query created by: Yessy Cuellar on 1/3/2024 6:51 AM      Electronically signed by:  ANASTACIO FINK 2024 9:19 PM